# Patient Record
Sex: FEMALE | Race: BLACK OR AFRICAN AMERICAN | Employment: UNEMPLOYED | URBAN - METROPOLITAN AREA
[De-identification: names, ages, dates, MRNs, and addresses within clinical notes are randomized per-mention and may not be internally consistent; named-entity substitution may affect disease eponyms.]

---

## 2019-09-06 ENCOUNTER — HOSPITAL ENCOUNTER (EMERGENCY)
Age: 64
Discharge: HOME | End: 2019-09-06
Attending: EMERGENCY MEDICINE
Payer: COMMERCIAL

## 2019-09-06 VITALS
HEIGHT: 63 IN | TEMPERATURE: 98.3 F | WEIGHT: 159.61 LBS | OXYGEN SATURATION: 100 % | SYSTOLIC BLOOD PRESSURE: 128 MMHG | RESPIRATION RATE: 16 BRPM | DIASTOLIC BLOOD PRESSURE: 73 MMHG | HEART RATE: 81 BPM | BODY MASS INDEX: 28.28 KG/M2

## 2019-09-06 DIAGNOSIS — R42 LIGHTHEADEDNESS: Primary | ICD-10-CM

## 2019-09-06 DIAGNOSIS — R74.8 ELEVATED CK: ICD-10-CM

## 2019-09-06 LAB
ALBUMIN SERPL-MCNC: 4.1 G/DL (ref 3.5–5)
ALBUMIN/GLOB SERPL: 0.9 {RATIO} (ref 1.1–2.2)
ALP SERPL-CCNC: 99 U/L (ref 45–117)
ALT SERPL-CCNC: 25 U/L (ref 12–78)
ANION GAP SERPL CALC-SCNC: 7 MMOL/L (ref 5–15)
APPEARANCE UR: CLEAR
AST SERPL-CCNC: 23 U/L (ref 15–37)
BACTERIA URNS QL MICRO: NEGATIVE /HPF
BASOPHILS # BLD: 0 K/UL (ref 0–0.1)
BASOPHILS NFR BLD: 1 % (ref 0–1)
BILIRUB SERPL-MCNC: 0.4 MG/DL (ref 0.2–1)
BILIRUB UR QL: NEGATIVE
BUN SERPL-MCNC: 13 MG/DL (ref 6–20)
BUN/CREAT SERPL: 14 (ref 12–20)
CALCIUM SERPL-MCNC: 9.1 MG/DL (ref 8.5–10.1)
CHLORIDE SERPL-SCNC: 103 MMOL/L (ref 97–108)
CK MB CFR SERPL CALC: 1.9 % (ref 0–2.5)
CK MB SERPL-MCNC: 5.1 NG/ML (ref 5–25)
CK SERPL-CCNC: 264 U/L (ref 26–192)
CO2 SERPL-SCNC: 27 MMOL/L (ref 21–32)
COLOR UR: ABNORMAL
CREAT SERPL-MCNC: 0.94 MG/DL (ref 0.55–1.02)
DIFFERENTIAL METHOD BLD: NORMAL
EOSINOPHIL # BLD: 0.1 K/UL (ref 0–0.4)
EOSINOPHIL NFR BLD: 1 % (ref 0–7)
EPITH CASTS URNS QL MICRO: ABNORMAL /LPF
ERYTHROCYTE [DISTWIDTH] IN BLOOD BY AUTOMATED COUNT: 12.4 % (ref 11.5–14.5)
GLOBULIN SER CALC-MCNC: 4.6 G/DL (ref 2–4)
GLUCOSE SERPL-MCNC: 100 MG/DL (ref 65–100)
GLUCOSE UR STRIP.AUTO-MCNC: NEGATIVE MG/DL
HCT VFR BLD AUTO: 40 % (ref 35–47)
HGB BLD-MCNC: 12.6 G/DL (ref 11.5–16)
HGB UR QL STRIP: NEGATIVE
HYALINE CASTS URNS QL MICRO: ABNORMAL /LPF (ref 0–5)
IMM GRANULOCYTES # BLD AUTO: 0 K/UL (ref 0–0.04)
IMM GRANULOCYTES NFR BLD AUTO: 0 % (ref 0–0.5)
KETONES UR QL STRIP.AUTO: NEGATIVE MG/DL
LEUKOCYTE ESTERASE UR QL STRIP.AUTO: ABNORMAL
LYMPHOCYTES # BLD: 1.9 K/UL (ref 0.8–3.5)
LYMPHOCYTES NFR BLD: 35 % (ref 12–49)
MCH RBC QN AUTO: 27.3 PG (ref 26–34)
MCHC RBC AUTO-ENTMCNC: 31.5 G/DL (ref 30–36.5)
MCV RBC AUTO: 86.6 FL (ref 80–99)
MONOCYTES # BLD: 0.5 K/UL (ref 0–1)
MONOCYTES NFR BLD: 8 % (ref 5–13)
NEUTS SEG # BLD: 2.9 K/UL (ref 1.8–8)
NEUTS SEG NFR BLD: 55 % (ref 32–75)
NITRITE UR QL STRIP.AUTO: NEGATIVE
NRBC # BLD: 0 K/UL (ref 0–0.01)
NRBC BLD-RTO: 0 PER 100 WBC
PH UR STRIP: 7 [PH] (ref 5–8)
PLATELET # BLD AUTO: 255 K/UL (ref 150–400)
PMV BLD AUTO: 11.2 FL (ref 8.9–12.9)
POTASSIUM SERPL-SCNC: 3.5 MMOL/L (ref 3.5–5.1)
PROT SERPL-MCNC: 8.7 G/DL (ref 6.4–8.2)
PROT UR STRIP-MCNC: NEGATIVE MG/DL
RBC # BLD AUTO: 4.62 M/UL (ref 3.8–5.2)
RBC #/AREA URNS HPF: ABNORMAL /HPF (ref 0–5)
SODIUM SERPL-SCNC: 137 MMOL/L (ref 136–145)
SP GR UR REFRACTOMETRY: 1.01 (ref 1–1.03)
TROPONIN I SERPL-MCNC: <0.05 NG/ML
UA: UC IF INDICATED,UAUC: ABNORMAL
UROBILINOGEN UR QL STRIP.AUTO: 1 EU/DL (ref 0.2–1)
WBC # BLD AUTO: 5.3 K/UL (ref 3.6–11)
WBC URNS QL MICRO: ABNORMAL /HPF (ref 0–4)

## 2019-09-06 PROCEDURE — 84484 ASSAY OF TROPONIN QUANT: CPT

## 2019-09-06 PROCEDURE — 81001 URINALYSIS AUTO W/SCOPE: CPT

## 2019-09-06 PROCEDURE — 82550 ASSAY OF CK (CPK): CPT

## 2019-09-06 PROCEDURE — 36415 COLL VENOUS BLD VENIPUNCTURE: CPT

## 2019-09-06 PROCEDURE — 93005 ELECTROCARDIOGRAM TRACING: CPT

## 2019-09-06 PROCEDURE — 80053 COMPREHEN METABOLIC PANEL: CPT

## 2019-09-06 PROCEDURE — 82553 CREATINE MB FRACTION: CPT

## 2019-09-06 PROCEDURE — 85025 COMPLETE CBC W/AUTO DIFF WBC: CPT

## 2019-09-06 PROCEDURE — 99284 EMERGENCY DEPT VISIT MOD MDM: CPT

## 2019-09-06 RX ORDER — LOVASTATIN 40 MG/1
40 TABLET ORAL
COMMUNITY
End: 2022-09-09

## 2019-09-07 LAB
ATRIAL RATE: 81 BPM
CALCULATED P AXIS, ECG09: 78 DEGREES
CALCULATED R AXIS, ECG10: 52 DEGREES
CALCULATED T AXIS, ECG11: 70 DEGREES
DIAGNOSIS, 93000: NORMAL
P-R INTERVAL, ECG05: 136 MS
Q-T INTERVAL, ECG07: 348 MS
QRS DURATION, ECG06: 68 MS
QTC CALCULATION (BEZET), ECG08: 404 MS
VENTRICULAR RATE, ECG03: 81 BPM

## 2019-09-07 NOTE — ED PROVIDER NOTES
EMERGENCY DEPARTMENT HISTORY AND PHYSICAL EXAM      Date: 9/6/2019  Patient Name: Tung Mckeon  Patient Age and Sex: 59 y.o. female    History of Presenting Illness     Chief Complaint   Patient presents with    Dizziness     pt reports generalized weakness; reports 1 episode yesterday which subsided, with another episode today lasting about 6 minutes       History Provided By: Patient    Ability to gather history was limited by: none    HPI: Tung Mckeon, 59 y.o. female complains of vague symptoms of lightheadedness episodically since yesterday. She also has a sense of generalized total body weakness. She denies any pain or fevers, no muscle aches. She has no focal area of particular weakness, other than chronic weakness of the left leg secondary to myelopathy. This weakness is unchanged today. She has no headache, no chest pain, no vision or speech changes. No neck pain. She has not tried any new medications for her symptoms. At the time of my H&P she is asymptomatic and is requesting to be discharged home. Location: Lightheadedness  Quality:    Lightheadedness  Severity: Mild 2 days  Duration: 2 days to the  Timing:    Waxing and waning  Context:  None  Modifying factors: None  Associated symptoms: None    Pt denies any other alleviating or exacerbating factors. There are no other complaints, changes or physical findings at this time. Past Medical History:   Diagnosis Date    Hypertension     Spine disorder      No past surgical history on file. PCP: Jose Elias Lobo MD    Past History   Past Medical History:  Past Medical History:   Diagnosis Date    Hypertension     Spine disorder        Past Surgical History:  No past surgical history on file.     Family History:  Family History   Problem Relation Age of Onset    No Known Problems Mother     No Known Problems Father        Social History:  Social History     Tobacco Use    Smoking status: Never Smoker    Smokeless tobacco: Never Used Substance Use Topics    Alcohol use: Yes     Comment: rare    Drug use: No       Allergies: Allergies   Allergen Reactions    Lexapro [Escitalopram] Other (comments)     Elevated BP       Current Medications:  No current facility-administered medications on file prior to encounter. Current Outpatient Medications on File Prior to Encounter   Medication Sig Dispense Refill    lovastatin (MEVACOR) 40 mg tablet Take 40 mg by mouth nightly.  amLODIPine (NORVASC) 10 mg tablet Take  by mouth daily.  pravastatin (PRAVACHOL) 40 mg tablet Take 40 mg by mouth nightly.  methocarbamol (ROBAXIN) 500 mg tablet Take  by mouth three (3) times daily. Review of Systems   Review of Systems   Neurological: Positive for light-headedness. Negative for headaches. All other systems reviewed and are negative. Physical Exam   Vital Signs  Patient Vitals for the past 24 hrs:   Temp Pulse Resp BP SpO2   09/06/19 2124 -- 81 -- 128/73 --   09/06/19 2122 -- 79 -- 135/67 --   09/06/19 2120 -- 68 -- 144/69 --   09/06/19 1706 98.3 °F (36.8 °C) 80 16 (!) 129/108 100 %       Physical Exam   Constitutional: She is oriented to person, place, and time. She appears well-developed and well-nourished. No distress. HENT:   Head: Normocephalic and atraumatic. Eyes: Conjunctivae are normal. Right eye exhibits no discharge. Left eye exhibits no discharge. Neck: Normal range of motion. Neck supple. Cardiovascular: Normal rate, regular rhythm and normal heart sounds. No murmur heard. Pulmonary/Chest: Effort normal and breath sounds normal. No respiratory distress. She has no wheezes. Abdominal: Soft. She exhibits no distension. There is no tenderness. Musculoskeletal: Normal range of motion. She exhibits no deformity. Neurological: She is alert and oriented to person, place, and time. No cranial nerve deficit or sensory deficit. She exhibits normal muscle tone. GCS eye subscore is 4.  GCS verbal subscore is 5. GCS motor subscore is 6. Reflex Scores:       Patellar reflexes are 2+ on the right side and 3+ on the left side. LLE 4/5 strength flexors and extensors, slightly hyperreflexic. She reports both these findings are chronic and unchanged from baseline. Normal sensory exam.  Cranial nerves intact. Skin: Skin is warm and dry. No rash noted. Psychiatric: She has a normal mood and affect. Her behavior is normal. Thought content normal.   Nursing note and vitals reviewed. Diagnostic Study Results   Labs  Recent Results (from the past 24 hour(s))   EKG, 12 LEAD, INITIAL    Collection Time: 09/06/19  5:10 PM   Result Value Ref Range    Ventricular Rate 81 BPM    Atrial Rate 81 BPM    P-R Interval 136 ms    QRS Duration 68 ms    Q-T Interval 348 ms    QTC Calculation (Bezet) 404 ms    Calculated P Axis 78 degrees    Calculated R Axis 52 degrees    Calculated T Axis 70 degrees    Diagnosis       Normal sinus rhythm  Normal ECG  No previous ECGs available     CBC WITH AUTOMATED DIFF    Collection Time: 09/06/19  5:38 PM   Result Value Ref Range    WBC 5.3 3.6 - 11.0 K/uL    RBC 4.62 3.80 - 5.20 M/uL    HGB 12.6 11.5 - 16.0 g/dL    HCT 40.0 35.0 - 47.0 %    MCV 86.6 80.0 - 99.0 FL    MCH 27.3 26.0 - 34.0 PG    MCHC 31.5 30.0 - 36.5 g/dL    RDW 12.4 11.5 - 14.5 %    PLATELET 019 317 - 058 K/uL    MPV 11.2 8.9 - 12.9 FL    NRBC 0.0 0  WBC    ABSOLUTE NRBC 0.00 0.00 - 0.01 K/uL    NEUTROPHILS 55 32 - 75 %    LYMPHOCYTES 35 12 - 49 %    MONOCYTES 8 5 - 13 %    EOSINOPHILS 1 0 - 7 %    BASOPHILS 1 0 - 1 %    IMMATURE GRANULOCYTES 0 0.0 - 0.5 %    ABS. NEUTROPHILS 2.9 1.8 - 8.0 K/UL    ABS. LYMPHOCYTES 1.9 0.8 - 3.5 K/UL    ABS. MONOCYTES 0.5 0.0 - 1.0 K/UL    ABS. EOSINOPHILS 0.1 0.0 - 0.4 K/UL    ABS. BASOPHILS 0.0 0.0 - 0.1 K/UL    ABS. IMM.  GRANS. 0.0 0.00 - 0.04 K/UL    DF AUTOMATED     METABOLIC PANEL, COMPREHENSIVE    Collection Time: 09/06/19  5:38 PM   Result Value Ref Range    Sodium 137 136 - 145 mmol/L    Potassium 3.5 3.5 - 5.1 mmol/L    Chloride 103 97 - 108 mmol/L    CO2 27 21 - 32 mmol/L    Anion gap 7 5 - 15 mmol/L    Glucose 100 65 - 100 mg/dL    BUN 13 6 - 20 MG/DL    Creatinine 0.94 0.55 - 1.02 MG/DL    BUN/Creatinine ratio 14 12 - 20      GFR est AA >60 >60 ml/min/1.73m2    GFR est non-AA 60 (L) >60 ml/min/1.73m2    Calcium 9.1 8.5 - 10.1 MG/DL    Bilirubin, total 0.4 0.2 - 1.0 MG/DL    ALT (SGPT) 25 12 - 78 U/L    AST (SGOT) 23 15 - 37 U/L    Alk. phosphatase 99 45 - 117 U/L    Protein, total 8.7 (H) 6.4 - 8.2 g/dL    Albumin 4.1 3.5 - 5.0 g/dL    Globulin 4.6 (H) 2.0 - 4.0 g/dL    A-G Ratio 0.9 (L) 1.1 - 2.2     CK W/ REFLX CKMB    Collection Time: 09/06/19  5:38 PM   Result Value Ref Range     (H) 26 - 192 U/L   TROPONIN I    Collection Time: 09/06/19  5:38 PM   Result Value Ref Range    Troponin-I, Qt. <0.05 <0.05 ng/mL   CK-MB,QUANT.     Collection Time: 09/06/19  5:38 PM   Result Value Ref Range    CK - MB 5.1 (H) <3.6 NG/ML    CK-MB Index 1.9 0.0 - 2.5     URINALYSIS W/ REFLEX CULTURE    Collection Time: 09/06/19  9:01 PM   Result Value Ref Range    Color YELLOW/STRAW      Appearance CLEAR CLEAR      Specific gravity 1.012 1.003 - 1.030      pH (UA) 7.0 5.0 - 8.0      Protein NEGATIVE  NEG mg/dL    Glucose NEGATIVE  NEG mg/dL    Ketone NEGATIVE  NEG mg/dL    Bilirubin NEGATIVE  NEG      Blood NEGATIVE  NEG      Urobilinogen 1.0 0.2 - 1.0 EU/dL    Nitrites NEGATIVE  NEG      Leukocyte Esterase TRACE (A) NEG      WBC 0-4 0 - 4 /hpf    RBC 0-5 0 - 5 /hpf    Epithelial cells FEW FEW /lpf    Bacteria NEGATIVE  NEG /hpf    UA:UC IF INDICATED CULTURE NOT INDICATED BY UA RESULT CNI      Hyaline cast 0-2 0 - 5 /lpf       Radiologic Studies  No orders to display     CT Results  (Last 48 hours)    None        CXR Results  (Last 48 hours)    None          Procedures     EKG  Date/Time: 9/6/2019 9:37 PM  Performed by: Ricardo Enciso MD  Authorized by: Ricardo Enciso MD     ECG reviewed by ED Physician in the absence of a cardiologist: yes    Interpretation:     Interpretation: non-specific    Rate:     ECG rate assessment: normal    Rhythm:     Rhythm: sinus rhythm    Ectopy:     Ectopy: none    QRS:     QRS axis:  Normal  ST segments:     ST segments:  Normal  T waves:     T waves: normal          Medical Decision Making     Provider Notes (Medical Decision Making):   59-year-old female with vague lightheadedness and total body weakness symptoms, waxing and waning since yesterday, no syncope or chest pain or shortness of breath or fever. She is a symptomatic in the emergency department. She is well-appearing, no distress. Normal vital signs, normal orthostatic vital signs. Laboratories and EKG all reassuring. Physical exam reassuring. She is stable for discharge home, no concern for ACS, CVA, or acute spinal cord pathology. Delaney Lester MD  9:34 PM        Consult required? No      Medications Administered During ED Course:  Medications - No data to display       Diagnosis     Disposition:  Discharged    Clinical Impression:   1. Lightheadedness    2. Elevated CK        Attestation:  I personally performed the services described in this documentation on this date 9/6/2019 for patient Diane Colmenares. Delaney Lester MD        I was the first provider for this patient on this visit. To the best of my ability I reviewed relevant prior medical records, electrocardiograms, laboratories, and radiologic studies. The patient's presenting problems were discussed, and the patient was in agreement with the care plan formulated and outlined with them. Delaney Lester MD    Please note that this dictation was completed with Dragon voice recognition software. Quite often unanticipated grammatical, syntax, homophones, and other interpretive errors are inadvertently transcribed by the computer software.  Please disregard these errors and excuse any errors that have escaped final proofreading.

## 2019-09-07 NOTE — ED NOTES
Assumed care of patient from triage. Patient c/o lightheadedness and near syncope. Patient states she noticed and episode yesterday and sensation began again this evening. Patient denies syncope. Patient denies n/v/CP/SOB. Patient denies numbness/tingling. Patient a/o x 4.

## 2019-09-07 NOTE — ED NOTES
Orthostatic vitals completed per MD request. Patient tolerated well and denied dizziness with position change. Dr. Elizabeth Sultana notified.

## 2019-09-07 NOTE — ED NOTES
Patient discharged by Dr. Jocelyne Sifuentes. Patient ambulated with steady gait in NAD on departure.

## 2019-09-07 NOTE — DISCHARGE INSTRUCTIONS
Your CK (muscle enzyme) level is very mildly elevated at 264. This may be due to taking a statin. At this time you should continue to take your statin as prescribed, but you can discuss this elevated CK level with your doctor.

## 2022-06-09 ENCOUNTER — OFFICE VISIT (OUTPATIENT)
Dept: ORTHOPEDIC SURGERY | Age: 67
End: 2022-06-09
Payer: COMMERCIAL

## 2022-06-09 VITALS — BODY MASS INDEX: 28.89 KG/M2 | WEIGHT: 157 LBS | HEIGHT: 62 IN

## 2022-06-09 DIAGNOSIS — M54.50 CHRONIC BILATERAL LOW BACK PAIN WITHOUT SCIATICA: Primary | ICD-10-CM

## 2022-06-09 DIAGNOSIS — M54.2 NECK PAIN: ICD-10-CM

## 2022-06-09 DIAGNOSIS — M48.062 SPINAL STENOSIS OF LUMBAR REGION WITH NEUROGENIC CLAUDICATION: ICD-10-CM

## 2022-06-09 DIAGNOSIS — M54.2 PAIN IN NECK: ICD-10-CM

## 2022-06-09 DIAGNOSIS — M48.02 CERVICAL STENOSIS OF SPINAL CANAL: ICD-10-CM

## 2022-06-09 DIAGNOSIS — M43.16 SPONDYLOLISTHESIS OF LUMBAR REGION: ICD-10-CM

## 2022-06-09 DIAGNOSIS — G89.29 CHRONIC BILATERAL LOW BACK PAIN WITHOUT SCIATICA: Primary | ICD-10-CM

## 2022-06-09 DIAGNOSIS — M50.90 CERVICAL DISC DISEASE: ICD-10-CM

## 2022-06-09 PROCEDURE — 1123F ACP DISCUSS/DSCN MKR DOCD: CPT | Performed by: ORTHOPAEDIC SURGERY

## 2022-06-09 PROCEDURE — 99204 OFFICE O/P NEW MOD 45 MIN: CPT | Performed by: ORTHOPAEDIC SURGERY

## 2022-06-09 RX ORDER — ATORVASTATIN CALCIUM 20 MG/1
20 TABLET, FILM COATED ORAL DAILY
COMMUNITY
Start: 2022-05-02

## 2022-06-09 RX ORDER — BACLOFEN 20 MG/1
TABLET ORAL
COMMUNITY
Start: 2022-05-02

## 2022-06-09 RX ORDER — AMLODIPINE BESYLATE 10 MG/1
10 TABLET ORAL DAILY
COMMUNITY

## 2022-06-09 RX ORDER — MELOXICAM 15 MG/1
15 TABLET ORAL DAILY PRN
COMMUNITY
Start: 2022-02-14 | End: 2022-09-09

## 2022-06-09 NOTE — PROGRESS NOTES
Alessandra Morrissey (: 1955) is a 79 y.o. female, patient, here for evaluation of the following chief complaint(s):  LOW BACK PAIN       ASSESSMENT/PLAN:    Below is the assessment and plan developed based on review of pertinent history, physical exam, labs, studies, and medications. She states that she had a bad neck injury several years ago and was told not to have surgery. She is having increasing signs of ataxia and balance issues. She has hyperreflexia in the upper and lower extremities. While she does have a mild spondylolisthesis at L4-5 I think I need to rule out any cord compression in the neck. I am going to get an MRI of both the neck and the lumbar spine and see her back. 1. Chronic bilateral low back pain without sciatica  -     XR SPINE LUMB MIN 4 V; Future  -     MRI CERV SPINE WO CONT; Future  -     MRI LUMB SPINE WO CONT; Future  2. Pain in neck  -     MRI CERV SPINE WO CONT; Future      No follow-ups on file. SUBJECTIVE/OBJECTIVE:  Alessandra Morrissey (: 1955) is a 79 y.o. female. No flowsheet data found. The patient presents today with chronic neck pain and lower back pain. She has a remote history of some sort of cervical injury in the remote past but is unclear about this on history. Recently has been reporting increasing stiffness in the lower back as well as a lot of difficulty with balance issues and ataxia. She is now having to walk with a cane. She denies any bowel bladder difficulties    Imaging:    XR Results (most recent):  Results from Appointment encounter on 22    XR SPINE LUMB MIN 4 V    Narrative  AP and lateral flexion-extension of the lumbar spine reviewed today. She has mild grade 1 spondylosis at L4-5. She has spondylosis in the lumbar spine. She has no fracture lytic lesions                 MRI Results (most recent):           Allergies   Allergen Reactions    Lexapro [Escitalopram] Other (comments)     Elevated BP       Current Outpatient Medications   Medication Sig    baclofen (LIORESAL) 20 mg tablet TAKE 1 TABLET BY MOUTH THREE TIMES DAILY AS NEEDED FOR PAIN    atorvastatin (LIPITOR) 20 mg tablet Take 20 mg by mouth daily.  amLODIPine (NORVASC) 10 mg tablet Take  by mouth.  lovastatin (MEVACOR) 40 mg tablet Take 40 mg by mouth nightly.  meloxicam (MOBIC) 15 mg tablet Take 15 mg by mouth daily as needed. (Patient not taking: Reported on 6/9/2022)    pravastatin (PRAVACHOL) 40 mg tablet Take 40 mg by mouth nightly. (Patient not taking: Reported on 6/9/2022)    amLODIPine (NORVASC) 10 mg tablet Take  by mouth daily. (Patient not taking: Reported on 6/9/2022)    methocarbamol (ROBAXIN) 500 mg tablet Take  by mouth three (3) times daily. (Patient not taking: Reported on 6/9/2022)     No current facility-administered medications for this visit. Past Medical History:   Diagnosis Date    Hypertension     Spine disorder         No past surgical history on file. Family History   Problem Relation Age of Onset    No Known Problems Mother     No Known Problems Father         Social History     Tobacco Use    Smoking status: Never Smoker    Smokeless tobacco: Never Used   Vaping Use    Vaping Use: Not on file   Substance Use Topics    Alcohol use: Yes     Comment: rare    Drug use: No        Review of Systems       Vitals:  Ht 5' 2\" (1.575 m)   Wt 157 lb (71.2 kg)   BMI 28.72 kg/m²    Body mass index is 28.72 kg/m². Ortho Exam       Alert and [place]  x 3    Normal gait and station; normal posture    No assistive devices today. Lumbar spine:  Examination of the lumbar spine demonstrates no tenderness on palpation, no pain, no swelling or edema, with normal lumbar range of motion. Thoracic spine: Examination of the thoracic spine demonstrates no tenderness on palpation, no pain, no swelling or edema. Normal sensation and range of motion.      Cervical spine:     Examination of the cervical spine demonstrates on inspection: No acute changes. NO shoulder assymetry. No incisions . On palpation: Mild tenderness on palpation of the lower CT junctions    Range of motion: Normal    Motor examination:  Right deltoid 5/5,  left deltoid 5/5, right bicep 5/5, left bicep 5/5, right wrist extensor 5/5, left wrist extensor 5/5, right biceps 5/5, left triceps 5/5, right intrinsics 5/5, left intrinsics    Sensory examination: Reveals No gross deficits    Reflexes: Left bicep 2/2, left triceps 2/2, right biceps 2/2, left triceps 2/2    Functional testing: Spurling's exam is mild positive bilaterally; upper limb tension test is mildly positive    Katz's signs mildly positive bilaterally. An electronic signature was used to authenticate this note.   -- Mireya Reaves MD

## 2022-06-09 NOTE — LETTER
7/5/2022    Patient: Jessica Lou   YOB: 1955   Date of Visit: 6/9/2022     Jorge Ventura, [address] [facility] [address]  [address] [address]  Via Fax: [phone]    Dear Jorge Ventura MD,      Thank you for referring Ms. Francisco Saldivar to [facility] for evaluation. My notes for this consultation are attached. If you have questions, please do not hesitate to call me. I look forward to following your patient along with you.       Sincerely,    Edin Mccollum MD

## 2022-07-05 NOTE — PATIENT INSTRUCTIONS
Neck: Exercises  Introduction  Here are some examples of exercises for you to try. The exercises may be suggested for a condition or for rehabilitation. Start each exercise slowly. Ease off the exercises if you start to have pain. You will be told when to start these exercises and which ones will work best for you. How to do the exercises  Neck stretch    1. This stretch works best if you keep your shoulder down as you lean away from it. To help you remember to do this, start by relaxing your shoulders and lightly holding on to your thighs or your chair. 2. Tilt your head toward your shoulder and hold for 15 to 30 seconds. Let the weight of your head stretch your muscles. 3. If you would like a little added stretch, use your hand to gently and steadily pull your head toward your shoulder. For example, keeping your right shoulder down, lean your head to the left. 4. Repeat 2 to 4 times toward each shoulder. Diagonal neck stretch    1. Turn your head slightly toward the direction you will be stretching, and tilt your head diagonally toward your chest and hold for 15 to 30 seconds. 2. If you would like a little added stretch, use your hand to gently and steadily pull your head forward on the diagonal.  3. Repeat 2 to 4 times toward each side. Dorsal glide stretch    The dorsal glide stretches the back of the neck. If you feel pain, do not glide so far back. Some people find this exercise easier to do while lying on their backs with an ice pack on the neck. 1. Sit or stand tall and look straight ahead. 2. Slowly tuck your chin as you glide your head backward over your body  3. Hold for a count of 6, and then relax for up to 10 seconds. 4. Repeat 8 to 12 times. Chest and shoulder stretch    1. Sit or stand tall and glide your head backward as in the dorsal glide stretch. 2. Raise both arms so that your hands are next to your ears.   3. Take a deep breath, and as you breathe out, lower your elbows down and behind your back. You will feel your shoulder blades slide down and together, and at the same time you will feel a stretch across your chest and the front of your shoulders. 4. Hold for about 6 seconds, and then relax for up to 10 seconds. 5. Repeat 8 to 12 times. Strengthening: Hands on head    1. Move your head backward, forward, and side to side against gentle pressure from your hands, holding each position for about 6 seconds. 2. Repeat 8 to 12 times. Follow-up care is a key part of your treatment and safety. Be sure to make and go to all appointments, and call your doctor if you are having problems. It's also a good idea to know your test results and keep a list of the medicines you take. Where can you learn more? Go to http://www.ga.com/  Enter P975 in the search box to learn more about \"Neck: Exercises. \"  Current as of: July 1, 2021               Content Version: 13.2  © 2006-2022 [employer]. Care instructions adapted under license by [employer] (which disclaims liability or warranty for this information). If you have questions about a medical condition or this instruction, always ask your healthcare professional. [address] any warranty or liability for your use of this information.

## 2022-08-04 NOTE — PROGRESS NOTES
Marito Abraham called the case was denied for the Cervical  spine. No reason for denial listed. The require a P2P. They are aware this will likely not be completed prior to the apt tomorrow as the providers are in the OR.     Case:  [id number]  Phone: [phone]

## 2022-08-05 ENCOUNTER — HOSPITAL ENCOUNTER (OUTPATIENT)
Dept: MRI IMAGING | Age: 67
Discharge: HOME | End: 2022-08-05
Attending: ORTHOPAEDIC SURGERY
Payer: COMMERCIAL

## 2022-08-05 ENCOUNTER — APPOINTMENT (OUTPATIENT)
Dept: MRI IMAGING | Age: 67
End: 2022-08-05
Attending: ORTHOPAEDIC SURGERY
Payer: COMMERCIAL

## 2022-08-05 DIAGNOSIS — M48.02 CERVICAL STENOSIS OF SPINAL CANAL: ICD-10-CM

## 2022-08-05 DIAGNOSIS — M54.2 NECK PAIN: ICD-10-CM

## 2022-08-05 DIAGNOSIS — M48.062 SPINAL STENOSIS OF LUMBAR REGION WITH NEUROGENIC CLAUDICATION: ICD-10-CM

## 2022-08-05 DIAGNOSIS — G89.29 CHRONIC BILATERAL LOW BACK PAIN WITHOUT SCIATICA: ICD-10-CM

## 2022-08-05 DIAGNOSIS — M54.50 CHRONIC BILATERAL LOW BACK PAIN WITHOUT SCIATICA: ICD-10-CM

## 2022-08-05 DIAGNOSIS — M54.2 PAIN IN NECK: ICD-10-CM

## 2022-08-05 DIAGNOSIS — M50.90 CERVICAL DISC DISEASE: ICD-10-CM

## 2022-08-05 PROCEDURE — 72148 MRI LUMBAR SPINE W/O DYE: CPT

## 2022-08-15 ENCOUNTER — TELEPHONE (OUTPATIENT)
Dept: ORTHOPEDIC SURGERY | Age: 67
End: 2022-08-15

## 2022-08-15 NOTE — TELEPHONE ENCOUNTER
Alice Mark completed a peer to peer with Dr. Shaye Ordonez. MRI of the C spine approved [id number] Exp 09/29/2022. Patient notified this is approved and advised to call and reschedule the study. Once the study is completed she needs an office visit to review the results.

## 2022-08-24 ENCOUNTER — HOSPITAL ENCOUNTER (OUTPATIENT)
Dept: MRI IMAGING | Age: 67
Discharge: HOME | End: 2022-08-24
Attending: ORTHOPAEDIC SURGERY
Payer: COMMERCIAL

## 2022-08-24 DIAGNOSIS — G89.29 CHRONIC BILATERAL LOW BACK PAIN WITHOUT SCIATICA: ICD-10-CM

## 2022-08-24 DIAGNOSIS — M54.50 CHRONIC BILATERAL LOW BACK PAIN WITHOUT SCIATICA: ICD-10-CM

## 2022-08-24 DIAGNOSIS — M54.2 PAIN IN NECK: ICD-10-CM

## 2022-08-24 PROCEDURE — 72141 MRI NECK SPINE W/O DYE: CPT

## 2022-09-01 ENCOUNTER — OFFICE VISIT (OUTPATIENT)
Dept: ORTHOPEDIC SURGERY | Age: 67
End: 2022-09-01
Payer: COMMERCIAL

## 2022-09-01 VITALS — HEIGHT: 63 IN | BODY MASS INDEX: 27.64 KG/M2 | WEIGHT: 156 LBS

## 2022-09-01 DIAGNOSIS — M43.16 SPONDYLOLISTHESIS OF LUMBAR REGION: ICD-10-CM

## 2022-09-01 DIAGNOSIS — M48.062 SPINAL STENOSIS OF LUMBAR REGION WITH NEUROGENIC CLAUDICATION: ICD-10-CM

## 2022-09-01 DIAGNOSIS — M48.02 CERVICAL STENOSIS OF SPINAL CANAL: ICD-10-CM

## 2022-09-01 DIAGNOSIS — M54.2 NECK PAIN: Primary | ICD-10-CM

## 2022-09-01 PROCEDURE — 1123F ACP DISCUSS/DSCN MKR DOCD: CPT | Performed by: ORTHOPAEDIC SURGERY

## 2022-09-01 PROCEDURE — 99214 OFFICE O/P EST MOD 30 MIN: CPT | Performed by: ORTHOPAEDIC SURGERY

## 2022-09-01 NOTE — LETTER
9/1/2022    Patient: Soham Carter   YOB: 1955   Date of Visit: 9/1/2022     Anson Hughes, [address]  [address] [address]  Via Fax: [phone]    Dear Anson Hughes MD,      Thank you for referring Ms. Karen Hutton to [facility] for evaluation. My notes for this consultation are attached. If you have questions, please do not hesitate to call me. I look forward to following your patient along with you.       Sincerely,    Ashley Beal MD
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
## 2022-09-01 NOTE — PATIENT INSTRUCTIONS
Cervical Spinal Fusion: Before Your Surgery  What is cervical spinal fusion? Cervical spinal fusion is surgery that joins two or more of the vertebrae in your neck. When these bones are joined together, it's called fusion. After the joints are fused, they can no longer move. During the surgery, the doctor uses bone to make a \"bridge\" between your vertebrae. This bridge may be strengthened with metal plates and screws. In most cases, the doctor uses bone from another part of your body or bone that has been donated to a bone bank. But sometimes artificial bone is used. To do the surgery, the doctor makes a cut in either the front or the back of your neck. The cut is called an incision. It leaves a scar that fades with time. After surgery, you will stay in the hospital for a few days. Your neck will feel stiff or sore. You will get medicine to help with pain. Most people can go back to work after 4 to 6 weeks. But it may take a few months to get back to your usual activities. Follow-up care is a key part of your treatment and safety. Be sure to make and go to all appointments, and call your doctor if you are having problems. It's also a good idea to know your test results and keep a list of the medicines you take. How do you prepare for surgery? Surgery can be stressful. This information will help you understand what you can expect. And it will help you safely prepare for surgery. Preparing for surgery    Be sure you have someone to take you home. Anesthesia and pain medicine will make it unsafe for you to drive or get home on your own. Understand exactly what surgery is planned, along with the risks, benefits, and other options. If you take aspirin or some other blood thinner, ask your doctor if you should stop taking it before your surgery. Make sure that you understand exactly what your doctor wants you to do. These medicines increase the risk of bleeding.      Tell your doctor ALL the medicines, vitamins, supplements, and herbal remedies you take. Some may increase the risk of problems during your surgery. Your doctor will tell you if you should stop taking any of them before the surgery and how soon to do it. Make sure your doctor and the hospital have a copy of your advance directive. If you don't have one, you may want to prepare one. It lets others know your health care wishes. It's a good thing to have before any type of surgery or procedure. What happens on the day of surgery? Follow the instructions exactly about when to stop eating and drinking. If you don't, your surgery may be canceled. If your doctor told you to take your medicines on the day of surgery, take them with only a sip of water. Take a bath or shower before you come in for your surgery. Do not apply lotions, perfumes, deodorants, or nail polish. Do not shave the surgical site yourself. Take off all jewelry and piercings. And take out contact lenses, if you wear them. At the hospital or surgery center   Bring a picture ID. The area for surgery is often marked to make sure there are no errors. You will be kept comfortable and safe by your anesthesia provider. You will be asleep during the surgery. The surgery usually takes 2 to 4 hours. If more than two vertebrae are being fused together, it will take longer. When you wake up, you will be lying on your back. You will have a soft or hard collar around your neck. This will protect and support your neck. It will also keep you from turning your head. You may have a small plastic tube coming out of your incision. This is to drain fluids. It's usually taken out in 1 or 2 days. When should you call your doctor? You have questions or concerns. You do not understand how to prepare for your surgery. You become ill before surgery (such as fever, flu, or a cold).      You need to reschedule or have changed your mind about having the surgery. Where can you learn more? Go to http://www.gray.com/  Enter N543 in the search box to learn more about \"Cervical Spinal Fusion: Before Your Surgery. \"  Current as of: July 1, 2021               Content Version: 13.2  © 7294-7303 [employer]. Care instructions adapted under license by [employer] (which disclaims liability or warranty for this information). If you have questions about a medical condition or this instruction, always ask your healthcare professional. [address] any warranty or liability for your use of this information.

## 2022-09-03 DIAGNOSIS — M48.02 CERVICAL STENOSIS OF SPINAL CANAL: ICD-10-CM

## 2022-09-03 DIAGNOSIS — M48.062 SPINAL STENOSIS OF LUMBAR REGION WITH NEUROGENIC CLAUDICATION: Primary | ICD-10-CM

## 2022-09-03 DIAGNOSIS — M54.2 NECK PAIN: ICD-10-CM

## 2022-09-09 ENCOUNTER — HOSPITAL ENCOUNTER (OUTPATIENT)
Dept: PREADMISSION TESTING | Age: 67
Discharge: HOME | End: 2022-09-09
Payer: COMMERCIAL

## 2022-09-09 VITALS
HEART RATE: 67 BPM | DIASTOLIC BLOOD PRESSURE: 77 MMHG | RESPIRATION RATE: 18 BRPM | TEMPERATURE: 98.1 F | WEIGHT: 158.73 LBS | HEIGHT: 63 IN | BODY MASS INDEX: 28.12 KG/M2 | SYSTOLIC BLOOD PRESSURE: 162 MMHG

## 2022-09-09 LAB
ABO + RH BLD: NORMAL
APPEARANCE UR: CLEAR
BACTERIA URNS QL MICRO: NEGATIVE /HPF
BILIRUB UR QL: NEGATIVE
BLOOD GROUP ANTIBODIES SERPL: NORMAL
COLOR UR: NORMAL
EPITH CASTS URNS QL MICRO: NORMAL /LPF
GLUCOSE UR STRIP.AUTO-MCNC: NEGATIVE MG/DL
HGB UR QL STRIP: NEGATIVE
HYALINE CASTS URNS QL MICRO: NORMAL /LPF (ref 0–5)
KETONES UR QL STRIP.AUTO: NEGATIVE MG/DL
LEUKOCYTE ESTERASE UR QL STRIP.AUTO: NEGATIVE
NITRITE UR QL STRIP.AUTO: NEGATIVE
PH UR STRIP: 6 [PH] (ref 5–8)
PROT UR STRIP-MCNC: NEGATIVE MG/DL
RBC #/AREA URNS HPF: NORMAL /HPF (ref 0–5)
SP GR UR REFRACTOMETRY: 1.01 (ref 1–1.03)
SPECIMEN EXP DATE BLD: NORMAL
UA: UC IF INDICATED,UAUC: NORMAL
UROBILINOGEN UR QL STRIP.AUTO: 0.2 EU/DL (ref 0.2–1)
WBC URNS QL MICRO: NORMAL /HPF (ref 0–4)

## 2022-09-09 PROCEDURE — 36415 COLL VENOUS BLD VENIPUNCTURE: CPT

## 2022-09-09 PROCEDURE — 86900 BLOOD TYPING SEROLOGIC ABO: CPT

## 2022-09-09 PROCEDURE — 81001 URINALYSIS AUTO W/SCOPE: CPT

## 2022-09-09 RX ORDER — FISH OIL/DHA/EPA 1200-144MG
1 CAPSULE ORAL DAILY
COMMUNITY

## 2022-09-09 RX ORDER — ASPIRIN 81 MG/1
81 TABLET ORAL
COMMUNITY

## 2022-09-09 RX ORDER — MINERAL OIL
180 ENEMA (ML) RECTAL
COMMUNITY

## 2022-09-09 RX ORDER — AMOXICILLIN 250 MG
2 CAPSULE ORAL
COMMUNITY

## 2022-09-09 RX ORDER — BISMUTH SUBSALICYLATE 262 MG
1 TABLET,CHEWABLE ORAL DAILY
COMMUNITY

## 2022-09-09 NOTE — PERIOP NOTES
[address] [facility] INSTRUCTIONS  ORTHOPAEDIC    Surgery Date:   9/16/22    Your surgeon's office or [facility] staff will call you between 4 PM- 8 PM the day before surgery with your arrival time. If your surgery is on a Monday, you will receive a call the preceding Friday. Please report to [facility]Admitting on the 1st floor. Bring your insurance card, photo identification, and any copayment (if applicable). If you are going home the same day of your surgery, you must have a responsible adult to drive you home. You need to have a responsible adult to stay with you the first 24 hours after surgery and you should not drive a car for 24 hours following your surgery. Do NOT eat any solid foods after midnight the night before surgery including candy, mints or gum. You may drink clear liquids from midnight until 1 hour prior to arrival time. You may drink up to 12 ounces at one time every 4 hours. Do NOT drink alcohol or smoke 24 hours before surgery. STOP smoking for 14 days prior as it helps with breathing and healing after surgery. If your arrival time is 3pm or later, you may eat a light breakfast before 8am (toast, bagel-no butter, black coffee, plain tea, fruit juice-no pulp) Please note special instructions, if applicable, below for medications. If you are being admitted to the hospital,please leave personal belongings/luggage in your car until you have an assigned hospital room number. Please wear comfortable clothes. Wear your glasses instead of contacts. We ask that all money, jewelry and valuables be left at home. Wear no make up, particularly mascara, the day of surgery. All body piercings, rings, and jewelry need to be removed and left at home. Please remove any nail polish or artificial nails from your fingernails. Please wear your hair loose or down. Please no pony-tails, buns, or any metal hair accessories.  If you shower the morning of surgery, please do not apply any lotions or powders afterwards. You may wear deodorant. Do not shave any body area within 24 hours of your surgery. Please follow all instructions to avoid any potential surgical cancellation. Should your physical condition change, (i.e. fever, cold, flu, etc.) please notify your surgeon as soon as possible. It is important to be on time. If a situation occurs where you may be delayed, please call:  [phone] / [phone] on the day of surgery. The Preadmission Testing staff can be reached at [phone]. Special instructions: NONE    Current Outpatient Medications   Medication Sig    aspirin delayed-release 81 mg tablet Take  by mouth daily as needed for Pain. senna-docusate (Senokot-S) 8.6-50 mg per tablet Take 2 Tablets by mouth daily as needed for Constipation. fish oil-dha-epa 1,200-144-216 mg cap Take  by mouth daily. multivitamin (ONE A DAY) tablet Take 1 Tablet by mouth daily. fexofenadine (ALLEGRA) 180 mg tablet Take 180 mg by mouth daily as needed for Allergies. baclofen (LIORESAL) 20 mg tablet TAKE 1 TABLET BY MOUTH THREE TIMES DAILY AS NEEDED FOR PAIN    atorvastatin (LIPITOR) 20 mg tablet Take 20 mg by mouth daily. amLODIPine (NORVASC) 10 mg tablet Take  by mouth. No current facility-administered medications for this encounter. YOU MUST ONLY TAKE THESE MEDICATIONS THE MORNING OF SURGERY WITH A SIP OF WATER: AMLODIPINE,  ATORVASTATIN  MEDICATIONS TO TAKE THE MORNING OF SURGERY ONLY IF NEEDED: NONE  HOLD these prescription medications BEFORE Surgery: STOP ASPIRIN NOW FOR SURGERY  Ask your surgeon/prescribing physician about when/if to STOP taking these medications: NONE  Stop any non-steroidal anti-inflammatory drugs (i.e. Ibuprofen, Naproxen, Advil, Aleve) 3 days before surgery. You may take Tylenol. STOP all vitamins and herbal supplements 1 week prior to  surgery.   If you are currently taking Plavix, Coumadin, or any other blood-thinning/anticoagulant medication contact your prescribing physician for instructions. Preventing Infections Before and After - Your Surgery    IMPORTANT INSTRUCTIONS    You play an important role in your health and preparation for surgery. To reduce the germs on your skin you will need to shower with CHG soap (Chorhexidine gluconate 4%) two times before surgery. CHG soap (Hibiclens, Hex-A-Clens or store brand)  CHG soap will be provided at your Preadmission Testing (PAT) appointment. If you do not have a PAT appointment before surgery, you may arrange to  CHG soap from our office or purchase CHG soap at a pharmacy, grocery or department store. You need to purchase TWO 4 ounce bottles to use for your 2 showers. Steps to follow:  Veneg[place]long your hair with your normal shampoo and your body with regular soap and rinse well to remove shampoo and soap from your skin. Wet a clean washcloth and turn off the shower. Put CHG soap on washcloth and apply to your entire body from the neck down. Do not use on your head, face or private parts(genitals). Do not use CHG soap on open sores, wounds or areas of skin irritation. Wash you body gently for 5 minutes. Do not wash your skin too hard. This soap does not create lather. Pay special attention to your underarms and from your belly button to your feet. Turn the shower back on and rinse well to get CHG soap off your body. Pat your skin dry with a clean, dry towel. Do not apply lotions or moisturizer. Put on clean clothes and sleep on fresh bed sheets and do not allow pets to sleep with you. Shower with CHG soap 2 times before your surgery  The evening before your surgery  The morning of your surgery      Tips to help prevent infections after your surgery:  Protect your surgical wound from germs:  Hand washing is the most important thing you and your caregivers can do to prevent infections. Keep your bandage clean and dry!   Do not touch your surgical wound.  Use clean, freshly washed towels and washcloths every time you shower; do not share bath linens with others. Until your surgical wound is healed, wear clothing and sleep on bed linens each day that are clean and freshly washed. Do not allow pets to sleep in your bed with you or touch your surgical wound. Do not smoke - smoking delays wound healing. This may be a good time to stop smoking. If you have diabetes, it is important for you to manage your blood sugar levels properly before your surgery as well as after your surgery. Poorly managed blood sugar levels slow down wound healing and prevent you from healing completely. Prevention of Infection  Testing for Staphylococcus aureus on your skin before surgery    Staphylococcus aureus (staph) is a common bacteria that is found on the body. It normally does not cause infection on healthy skin. Before surgery, you will be tested to see if you have staph by swabbing the inside of your nose. When you have an incision with surgery, the goal is to protect that incision from infection. Removal of the staph bacteria before surgery can decrease the risk of a surgical site infection. If your nose swab is positive for staph you will be called. Your treatment will include 2 steps:  Prescription for Mupirocin ointment to be used in each nostril twice a day for 5 days. Showering with Chlorhexidine (CHG) liquid soap for 5 days prior to surgery. How to use Mupirocin ointment in your nose   the prescription from your pharmacy. You will receive a large tube of ointment which will be big enough for all of your treatments. You will apply this ointment to each nostril 2 times a day for 5 days. Wash your hands with  gel or soap and water for 20 seconds before using ointment. Place a pea-sized amount of ointment on a cotton Q-tip. Apply ointment just inside of each nostril with the Q-tip. Do not push Q-tip or ointment deep inside you nose.   Press your nostrils together and massage for a few seconds. Wash your hands with  gel or soap and water after you are finished. Do not get ointment near your eyes. If it gets into your eyes, rinse them with cool water. If you need to use nasal spray, clean the tip of the bottle with alcohol before use and do not use both at the same time. If you are scheduled for COVID testing during the 5 days, do NOT apply morning dose until after the COVID test has been performed. How to use Chlorhexidine (CHG) 4% liquid soap  Purchase an 8 ounce bottle of CHG liquid soap (Chlorhexidine 4%, Hibiclens, Hex-A-Clens or store brand) at a pharmacy or grocery store. Wash your hair with your normal shampoo and your body with regular soap and rinse well to remove shampoo and soap from your skin. Wet a clean washcloth and turn off the shower. Put CHG soap on washcloth and apply to your entire body from the neck down. Do not use on your head, face or private parts(genitals). Do not use CHG soap on open sores, wounds or areas of skin irritation. Wash your body gently for 5 minutes. Do not wash your skin too hard. This soap does not create lather. Pay special attention to your underarms and from your belly button to your feet. Turn the shower back on and rinse well to get CHG soap off your body. Pat your skin dry with a clean, dry towel. Do not apply lotions or moisturizer. Put on clean clothes and sleep on fresh bed sheets the night before surgery. Do not allow pets to sleep with you. Eating and Drinking Before Surgery    You may eat a regular dinner at the usual time on the day before your surgery. Do NOT eat any solid foods after midnight unless your arrival time at the hospital is 3pm or later. You may drink clear liquids only from 12 midnight until 1 hours prior to your arrival time at the hospital on the day of your surgery. Do NOT drink alcohol.   Clear liquids include:  Water  Fruit juices without pulp( i.e. apple juice)  Carbonated beverages  Black coffee (no cream/milk)  Tea (no cream/milk)  Gatorade  You may drink up to 12-16 ounces at one time every 4 hours between the hours of midnight and 1 hour before your arrival time at the hospital. Example- if your arrival time at the hospital is 6am, you may drink 12-16 ounces of clear liquids no later than 5am.  If your arrival time at the hospital is 3pm or later, you may eat a light breakfast before 8am.  A light breakfast includes: Toast or bagel (no butter)  Black coffee (no cream/milk)  Tea (no cream/milk)  Fruit juices without pulp ( i.e. apple juice)  Do NOT eat meat, eggs, vegetables or fruit  If you have any questions, please contact your surgeon's office. Patient Information Regarding COVID Restrictions    Day of Procedure    Please park in the parking deck or any designated visitor parking lot. Enter the facility through the Main Entrance of the hospital.  On the day of surgery, please provide the cell phone number of the person who will be waiting for you to the Patient Access representative at the time of registration. Please wear a mask on the day of your procedure. We are now allowing two designated visitors per stay. Pediatric patients may have 2 designated visitors. These two people may come in with you on the day of your procedure. The designated visitor must also wear a mask. Once your procedure and the immediate recovery period is completed, a nurse in the recovery area will contact your designated visitor to inform them of your room number or to otherwise review other pertinent information regarding your care. Social distancing practices are to be adhered to in waiting areas and the cafeteria. The patient was contacted in person. She verbalized understanding of all instructions does not  need reinforcement.

## 2022-09-10 LAB
BACTERIA SPEC CULT: NORMAL
BACTERIA SPEC CULT: NORMAL
SERVICE CMNT-IMP: NORMAL
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
## 2022-09-12 NOTE — PERIOP NOTES
PAT Nurse Practitioner   Pre-Operative Chart Review/Assessment:-ORTHOPEDIC/NEUROSURGICAL SPINE                Patient Name:  Anival Blas                                                           Age:   79 y.o.    :  1955     Today's Date:  2022     Date of PAT:   2022      Date of Surgery:    2022      Procedure(s):  C5-C6 ACDF     Surgeon:   Dr. Harinder Connors                       PLAN:       1)  PCP: Jamari Davis MD        2)  Cardiac Clearance: EKG and METs reviewed. No further cardiac evaluation requested. EKG from PCP on 22 showed NSR. 3)  Diabetic Treatment Consult: Not indicated. A1c-5.3       4)  Sleep Apnea evaluation:  Not indicated. SIN Score 2.       5)  Treatment for MRSA/Staph Aureus: Neg       6)  Additional Concerns: HTN              Vital Signs:         Vitals:    22 0920 22 0931   BP:  (!) 162/77   Pulse:  67   Resp:  18   Temp:  98.1 °F (36.7 °C)   Weight: 72 kg (158 lb 11.7 oz)    Height: 5' 3\" (1.6 m)           Body mass index is 28.12 kg/m².       ____________________________________________  PAST MEDICAL HISTORY  Past Medical History:   Diagnosis Date    Arthritis     High cholesterol     Hypertension     Spine disorder       ____________________________________________  PAST SURGICAL HISTORY  Past Surgical History:   Procedure Laterality Date    HX GYN      FIBROIDECTOMY    HX HEENT      DENTAL     ____________________________________________  HOME MEDICATIONS    Current Outpatient Medications   Medication Sig    aspirin delayed-release 81 mg tablet Take  by mouth daily as needed for Pain. senna-docusate (Senokot-S) 8.6-50 mg per tablet Take 2 Tablets by mouth daily as needed for Constipation. fish oil-dha-epa 1,200-144-216 mg cap Take  by mouth daily. multivitamin (ONE A DAY) tablet Take 1 Tablet by mouth daily. fexofenadine (ALLEGRA) 180 mg tablet Take 180 mg by mouth daily as needed for Allergies.     baclofen (LIORESAL) 20 mg tablet TAKE 1 TABLET BY MOUTH THREE TIMES DAILY AS NEEDED FOR PAIN    atorvastatin (LIPITOR) 20 mg tablet Take 20 mg by mouth daily. amLODIPine (NORVASC) 10 mg tablet Take  by mouth. No current facility-administered medications for this encounter.      ____________________________________________  ALLERGIES  Allergies   Allergen Reactions    Lexapro [Escitalopram] Other (comments)     Elevated BP      ____________________________________________  SOCIAL HISTORY  Social History     Tobacco Use    Smoking status: Never    Smokeless tobacco: Never   Substance Use Topics    Alcohol use: Yes     Comment: rare      ____________________________________________   Internal Administration   First Dose COVID-19, PFIZER PURPLE top, DILUTE for use, (age 15 y+), IM, 30mcg/0.3mL  03/06/2021   Second Dose COVID-19, PFIZER PURPLE top, DILUTE for use, (age 15 y+), IM, 30mcg/0.3mL  03/27/2021      Last COVID Lab No results found for: Roise Luke, RCV2CT, CVD2M, COV2, XPLCVT, [id number], [id number], COVV, XJCOVT, COVO4M, COVPCR, [id number]               ____________________________________________    Labs:     CBC, BMP, Hgb A1c, PT/INR and EKG on chart.     Hospital Outpatient Visit on 09/09/2022   Component Date Value Ref Range Status    Crossmatch Expiration 09/09/2022 09/19/2022,2359   Final    ABO/Rh(D) 09/09/2022 A POSITIVE   Final    Antibody screen 09/09/2022 NEG   Final    Color 09/09/2022 YELLOW/STRAW    Final    Color Reference Range: Straw, Yellow or Dark Yellow    Appearance 09/09/2022 CLEAR  CLEAR   Final    Specific gravity 09/09/2022 1.008  1.003 - 1.030   Final    pH (UA) 09/09/2022 6.0  5.0 - 8.0   Final    Protein 09/09/2022 Negative  NEG mg/dL Final    Glucose 09/09/2022 Negative  NEG mg/dL Final    Ketone 09/09/2022 Negative  NEG mg/dL Final    Bilirubin 09/09/2022 Negative  NEG   Final    Blood 09/09/2022 Negative  NEG   Final    Urobilinogen 09/09/2022 0.2  0.2 - 1.0 EU/dL Final    Nitrites 09/09/2022 Negative  NEG   Final    Leukocyte Esterase 09/09/2022 Negative  NEG   Final    UA:UC IF INDICATED 09/09/2022 CULTURE NOT INDICATED BY UA RESULT  CNI   Final    WBC 09/09/2022 0-4  0 - 4 /hpf Final    RBC 09/09/2022 0-5  0 - 5 /hpf Final    Epithelial cells 09/09/2022 FEW  FEW /lpf Final    Epithelial cell category consists of squamous cells and /or transitional urothelial cells. Renal tubular cells, if present, are separately identified as such. Bacteria 09/09/2022 Negative  NEG /hpf Final    Hyaline cast 09/09/2022 0-2  0 - 5 /lpf Final    Special Requests: 09/09/2022 NO SPECIAL REQUESTS    Final    Culture result: 09/09/2022 MRSA NOT PRESENT    Final    Culture result: 09/09/2022     Final                    Value:Screening of patient nares for MRSA is for surveillance purposes and, if positive, to facilitate isolation considerations in high risk settings. It is not intended for automatic decolonization interventions per se as regimens are not sufficiently effective to warrant routine use. Skin:     Denies open wounds, cuts, sores, rashes or other areas of concern in PAT assessment.         Linda Johnston NP
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
## 2022-09-16 ENCOUNTER — ANESTHESIA (OUTPATIENT)
Dept: SURGERY | Age: 67
End: 2022-09-16
Payer: COMMERCIAL

## 2022-09-16 ENCOUNTER — ANESTHESIA EVENT (OUTPATIENT)
Dept: SURGERY | Age: 67
End: 2022-09-16
Payer: COMMERCIAL

## 2022-09-16 ENCOUNTER — HOSPITAL ENCOUNTER (OUTPATIENT)
Age: 67
Discharge: HOME HEALTH | End: 2022-09-18
Attending: ORTHOPAEDIC SURGERY | Admitting: ORTHOPAEDIC SURGERY
Payer: COMMERCIAL

## 2022-09-16 ENCOUNTER — APPOINTMENT (OUTPATIENT)
Dept: GENERAL RADIOLOGY | Age: 67
End: 2022-09-16
Attending: ORTHOPAEDIC SURGERY
Payer: COMMERCIAL

## 2022-09-16 DIAGNOSIS — Z98.1 S/P CERVICAL SPINAL FUSION: ICD-10-CM

## 2022-09-16 DIAGNOSIS — M48.02 CERVICAL STENOSIS OF SPINE: Primary | ICD-10-CM

## 2022-09-16 LAB
GLUCOSE BLD STRIP.AUTO-MCNC: 99 MG/DL (ref 65–117)
SERVICE CMNT-IMP: NORMAL

## 2022-09-16 PROCEDURE — C1889 IMPLANT/INSERT DEVICE, NOC: HCPCS | Performed by: ORTHOPAEDIC SURGERY

## 2022-09-16 PROCEDURE — 74011250637 HC RX REV CODE- 250/637: Performed by: PHYSICIAN ASSISTANT

## 2022-09-16 PROCEDURE — C1713 ANCHOR/SCREW BN/BN,TIS/BN: HCPCS | Performed by: ORTHOPAEDIC SURGERY

## 2022-09-16 PROCEDURE — 74011000258 HC RX REV CODE- 258: Performed by: ANESTHESIOLOGY

## 2022-09-16 PROCEDURE — C1821 INTERSPINOUS IMPLANT: HCPCS | Performed by: ORTHOPAEDIC SURGERY

## 2022-09-16 PROCEDURE — 77030008684 HC TU ET CUF COVD -B: Performed by: ANESTHESIOLOGY

## 2022-09-16 PROCEDURE — 2709999900 HC NON-CHARGEABLE SUPPLY: Performed by: ORTHOPAEDIC SURGERY

## 2022-09-16 PROCEDURE — 22853 INSJ BIOMECHANICAL DEVICE: CPT | Performed by: PHYSICIAN ASSISTANT

## 2022-09-16 PROCEDURE — 22551 ARTHRD ANT NTRBDY CERVICAL: CPT | Performed by: PHYSICIAN ASSISTANT

## 2022-09-16 PROCEDURE — 74011250636 HC RX REV CODE- 250/636

## 2022-09-16 PROCEDURE — 77030003832 HC BIT DRL ATLNTS MEDT -B: Performed by: ORTHOPAEDIC SURGERY

## 2022-09-16 PROCEDURE — 77030026438 HC STYL ET INTUB CARD -A: Performed by: ANESTHESIOLOGY

## 2022-09-16 PROCEDURE — 77030040506 HC DRN WND MDII -A: Performed by: ORTHOPAEDIC SURGERY

## 2022-09-16 PROCEDURE — 76060000034 HC ANESTHESIA 1.5 TO 2 HR: Performed by: ORTHOPAEDIC SURGERY

## 2022-09-16 PROCEDURE — 74011000250 HC RX REV CODE- 250: Performed by: PHYSICIAN ASSISTANT

## 2022-09-16 PROCEDURE — 77030004391 HC BUR FLUT MEDT -C: Performed by: ORTHOPAEDIC SURGERY

## 2022-09-16 PROCEDURE — 76210000017 HC OR PH I REC 1.5 TO 2 HR: Performed by: ORTHOPAEDIC SURGERY

## 2022-09-16 PROCEDURE — 22845 INSERT SPINE FIXATION DEVICE: CPT | Performed by: PHYSICIAN ASSISTANT

## 2022-09-16 PROCEDURE — 74011000250 HC RX REV CODE- 250: Performed by: ANESTHESIOLOGY

## 2022-09-16 PROCEDURE — 74011250636 HC RX REV CODE- 250/636: Performed by: PHYSICIAN ASSISTANT

## 2022-09-16 PROCEDURE — 76010000162 HC OR TIME 1.5 TO 2 HR INTENSV-TIER 1: Performed by: ORTHOPAEDIC SURGERY

## 2022-09-16 PROCEDURE — 77030029099 HC BN WAX SSPC -A: Performed by: ORTHOPAEDIC SURGERY

## 2022-09-16 PROCEDURE — 74011000250 HC RX REV CODE- 250: Performed by: ORTHOPAEDIC SURGERY

## 2022-09-16 PROCEDURE — 22853 INSJ BIOMECHANICAL DEVICE: CPT | Performed by: ORTHOPAEDIC SURGERY

## 2022-09-16 PROCEDURE — 77030037713 HC CLOSR DEV INCIS ZIP STRY -B: Performed by: ORTHOPAEDIC SURGERY

## 2022-09-16 PROCEDURE — 77030038600 HC TU BPLR IRR DISP STRY -B: Performed by: ORTHOPAEDIC SURGERY

## 2022-09-16 PROCEDURE — 82962 GLUCOSE BLOOD TEST: CPT

## 2022-09-16 PROCEDURE — 74011250636 HC RX REV CODE- 250/636: Performed by: ANESTHESIOLOGY

## 2022-09-16 PROCEDURE — 22845 INSERT SPINE FIXATION DEVICE: CPT | Performed by: ORTHOPAEDIC SURGERY

## 2022-09-16 PROCEDURE — 74011250637 HC RX REV CODE- 250/637: Performed by: ANESTHESIOLOGY

## 2022-09-16 PROCEDURE — 22551 ARTHRD ANT NTRBDY CERVICAL: CPT | Performed by: ORTHOPAEDIC SURGERY

## 2022-09-16 DEVICE — IMPLANTABLE DEVICE: Type: IMPLANTABLE DEVICE | Site: SPINE CERVICAL | Status: FUNCTIONAL

## 2022-09-16 DEVICE — PLATE 3001017 ZEVO 17MM 1 LVL
Type: IMPLANTABLE DEVICE | Site: SPINE CERVICAL | Status: FUNCTIONAL
Brand: ZEVO™ ANTERIOR CERVICAL PLATE SYSTEM

## 2022-09-16 DEVICE — GRAFT HUM TISS 3X4 CM WND COVERING AMNIO MEMBRN VERSASHIELD: Type: IMPLANTABLE DEVICE | Site: SPINE CERVICAL | Status: FUNCTIONAL

## 2022-09-16 DEVICE — GRAFT BNE SUB SM CANC FRZN MORSELIZED W/ VIABLE CELL: Type: IMPLANTABLE DEVICE | Site: SPINE CERVICAL | Status: FUNCTIONAL

## 2022-09-16 RX ORDER — LIDOCAINE HYDROCHLORIDE 20 MG/ML
INJECTION, SOLUTION EPIDURAL; INFILTRATION; INTRACAUDAL; PERINEURAL AS NEEDED
Status: DISCONTINUED | OUTPATIENT
Start: 2022-09-16 | End: 2022-09-16 | Stop reason: HOSPADM

## 2022-09-16 RX ORDER — ACETAMINOPHEN 325 MG/1
650 TABLET ORAL ONCE
Status: COMPLETED | OUTPATIENT
Start: 2022-09-16 | End: 2022-09-16

## 2022-09-16 RX ORDER — LIDOCAINE HYDROCHLORIDE 10 MG/ML
0.1 INJECTION, SOLUTION EPIDURAL; INFILTRATION; INTRACAUDAL; PERINEURAL AS NEEDED
Status: DISCONTINUED | OUTPATIENT
Start: 2022-09-16 | End: 2022-09-16 | Stop reason: HOSPADM

## 2022-09-16 RX ORDER — AMLODIPINE BESYLATE 5 MG/1
10 TABLET ORAL DAILY
Status: DISCONTINUED | OUTPATIENT
Start: 2022-09-17 | End: 2022-09-18 | Stop reason: HOSPADM

## 2022-09-16 RX ORDER — FACIAL-BODY WIPES
10 EACH TOPICAL DAILY PRN
Status: DISCONTINUED | OUTPATIENT
Start: 2022-09-18 | End: 2022-09-18 | Stop reason: HOSPADM

## 2022-09-16 RX ORDER — SUCCINYLCHOLINE CHLORIDE 20 MG/ML
INJECTION INTRAMUSCULAR; INTRAVENOUS AS NEEDED
Status: DISCONTINUED | OUTPATIENT
Start: 2022-09-16 | End: 2022-09-16 | Stop reason: HOSPADM

## 2022-09-16 RX ORDER — FENTANYL CITRATE 50 UG/ML
INJECTION, SOLUTION INTRAMUSCULAR; INTRAVENOUS AS NEEDED
Status: DISCONTINUED | OUTPATIENT
Start: 2022-09-16 | End: 2022-09-16 | Stop reason: HOSPADM

## 2022-09-16 RX ORDER — KETOROLAC TROMETHAMINE 30 MG/ML
INJECTION, SOLUTION INTRAMUSCULAR; INTRAVENOUS
Status: COMPLETED
Start: 2022-09-16 | End: 2022-09-16

## 2022-09-16 RX ORDER — SODIUM CHLORIDE 0.9 % (FLUSH) 0.9 %
5-40 SYRINGE (ML) INJECTION EVERY 8 HOURS
Status: DISCONTINUED | OUTPATIENT
Start: 2022-09-16 | End: 2022-09-18 | Stop reason: HOSPADM

## 2022-09-16 RX ORDER — SODIUM CHLORIDE 9 MG/ML
125 INJECTION, SOLUTION INTRAVENOUS CONTINUOUS
Status: DISPENSED | OUTPATIENT
Start: 2022-09-16 | End: 2022-09-17

## 2022-09-16 RX ORDER — CYCLOBENZAPRINE HCL 10 MG
10 TABLET ORAL
Status: DISCONTINUED | OUTPATIENT
Start: 2022-09-16 | End: 2022-09-18 | Stop reason: HOSPADM

## 2022-09-16 RX ORDER — SODIUM CHLORIDE 0.9 % (FLUSH) 0.9 %
5-40 SYRINGE (ML) INJECTION AS NEEDED
Status: DISCONTINUED | OUTPATIENT
Start: 2022-09-16 | End: 2022-09-16 | Stop reason: HOSPADM

## 2022-09-16 RX ORDER — BACLOFEN 10 MG/1
20 TABLET ORAL 3 TIMES DAILY
Status: DISCONTINUED | OUTPATIENT
Start: 2022-09-16 | End: 2022-09-18 | Stop reason: HOSPADM

## 2022-09-16 RX ORDER — ASPIRIN 81 MG/1
81 TABLET ORAL DAILY
Status: DISCONTINUED | OUTPATIENT
Start: 2022-09-17 | End: 2022-09-18 | Stop reason: HOSPADM

## 2022-09-16 RX ORDER — OXYCODONE HYDROCHLORIDE 5 MG/1
5 TABLET ORAL
Status: DISCONTINUED | OUTPATIENT
Start: 2022-09-16 | End: 2022-09-18 | Stop reason: HOSPADM

## 2022-09-16 RX ORDER — MIDAZOLAM HYDROCHLORIDE 1 MG/ML
1 INJECTION, SOLUTION INTRAMUSCULAR; INTRAVENOUS AS NEEDED
Status: DISCONTINUED | OUTPATIENT
Start: 2022-09-16 | End: 2022-09-16 | Stop reason: HOSPADM

## 2022-09-16 RX ORDER — ATORVASTATIN CALCIUM 20 MG/1
20 TABLET, FILM COATED ORAL DAILY
Status: DISCONTINUED | OUTPATIENT
Start: 2022-09-17 | End: 2022-09-18 | Stop reason: HOSPADM

## 2022-09-16 RX ORDER — SODIUM CHLORIDE, SODIUM LACTATE, POTASSIUM CHLORIDE, CALCIUM CHLORIDE 600; 310; 30; 20 MG/100ML; MG/100ML; MG/100ML; MG/100ML
50 INJECTION, SOLUTION INTRAVENOUS CONTINUOUS
Status: DISCONTINUED | OUTPATIENT
Start: 2022-09-16 | End: 2022-09-16 | Stop reason: HOSPADM

## 2022-09-16 RX ORDER — ONDANSETRON 2 MG/ML
INJECTION INTRAMUSCULAR; INTRAVENOUS AS NEEDED
Status: DISCONTINUED | OUTPATIENT
Start: 2022-09-16 | End: 2022-09-16 | Stop reason: HOSPADM

## 2022-09-16 RX ORDER — FENTANYL CITRATE 50 UG/ML
50 INJECTION, SOLUTION INTRAMUSCULAR; INTRAVENOUS AS NEEDED
Status: DISCONTINUED | OUTPATIENT
Start: 2022-09-16 | End: 2022-09-16 | Stop reason: HOSPADM

## 2022-09-16 RX ORDER — GABAPENTIN 100 MG/1
100 CAPSULE ORAL 3 TIMES DAILY
Status: DISCONTINUED | OUTPATIENT
Start: 2022-09-16 | End: 2022-09-18 | Stop reason: HOSPADM

## 2022-09-16 RX ORDER — CETIRIZINE HCL 10 MG
10 TABLET ORAL DAILY
Status: DISCONTINUED | OUTPATIENT
Start: 2022-09-17 | End: 2022-09-18 | Stop reason: HOSPADM

## 2022-09-16 RX ORDER — OMEGA-3-ACID ETHYL ESTERS 1 G/1
1 CAPSULE, LIQUID FILLED ORAL DAILY
Status: DISCONTINUED | OUTPATIENT
Start: 2022-09-17 | End: 2022-09-18 | Stop reason: HOSPADM

## 2022-09-16 RX ORDER — MIDAZOLAM HYDROCHLORIDE 1 MG/ML
INJECTION, SOLUTION INTRAMUSCULAR; INTRAVENOUS AS NEEDED
Status: DISCONTINUED | OUTPATIENT
Start: 2022-09-16 | End: 2022-09-16 | Stop reason: HOSPADM

## 2022-09-16 RX ORDER — THERA TABS 400 MCG
1 TAB ORAL DAILY
Status: DISCONTINUED | OUTPATIENT
Start: 2022-09-17 | End: 2022-09-18 | Stop reason: HOSPADM

## 2022-09-16 RX ORDER — AMOXICILLIN 250 MG
1 CAPSULE ORAL 2 TIMES DAILY
Status: DISCONTINUED | OUTPATIENT
Start: 2022-09-16 | End: 2022-09-18 | Stop reason: HOSPADM

## 2022-09-16 RX ORDER — ROCURONIUM BROMIDE 10 MG/ML
INJECTION, SOLUTION INTRAVENOUS AS NEEDED
Status: DISCONTINUED | OUTPATIENT
Start: 2022-09-16 | End: 2022-09-16 | Stop reason: HOSPADM

## 2022-09-16 RX ORDER — DIPHENHYDRAMINE HYDROCHLORIDE 50 MG/ML
12.5 INJECTION, SOLUTION INTRAMUSCULAR; INTRAVENOUS
Status: ACTIVE | OUTPATIENT
Start: 2022-09-16 | End: 2022-09-17

## 2022-09-16 RX ORDER — KETOROLAC TROMETHAMINE 30 MG/ML
30 INJECTION, SOLUTION INTRAMUSCULAR; INTRAVENOUS
Status: COMPLETED | OUTPATIENT
Start: 2022-09-16 | End: 2022-09-16

## 2022-09-16 RX ORDER — HYDROMORPHONE HYDROCHLORIDE 1 MG/ML
0.2 INJECTION, SOLUTION INTRAMUSCULAR; INTRAVENOUS; SUBCUTANEOUS
Status: DISCONTINUED | OUTPATIENT
Start: 2022-09-16 | End: 2022-09-16 | Stop reason: HOSPADM

## 2022-09-16 RX ORDER — ONDANSETRON 2 MG/ML
4 INJECTION INTRAMUSCULAR; INTRAVENOUS AS NEEDED
Status: DISCONTINUED | OUTPATIENT
Start: 2022-09-16 | End: 2022-09-16 | Stop reason: HOSPADM

## 2022-09-16 RX ORDER — SODIUM CHLORIDE 0.9 % (FLUSH) 0.9 %
5-40 SYRINGE (ML) INJECTION EVERY 8 HOURS
Status: DISCONTINUED | OUTPATIENT
Start: 2022-09-16 | End: 2022-09-16 | Stop reason: HOSPADM

## 2022-09-16 RX ORDER — OXYCODONE HYDROCHLORIDE 5 MG/1
5-10 TABLET ORAL
Qty: 50 TABLET | Refills: 0 | Status: SHIPPED | OUTPATIENT
Start: 2022-09-16 | End: 2022-09-23

## 2022-09-16 RX ORDER — ACETAMINOPHEN 500 MG
1000 TABLET ORAL EVERY 6 HOURS
Status: DISCONTINUED | OUTPATIENT
Start: 2022-09-16 | End: 2022-09-18 | Stop reason: HOSPADM

## 2022-09-16 RX ORDER — ONDANSETRON 2 MG/ML
4 INJECTION INTRAMUSCULAR; INTRAVENOUS
Status: DISPENSED | OUTPATIENT
Start: 2022-09-16 | End: 2022-09-17

## 2022-09-16 RX ORDER — PROPOFOL 10 MG/ML
INJECTION, EMULSION INTRAVENOUS AS NEEDED
Status: DISCONTINUED | OUTPATIENT
Start: 2022-09-16 | End: 2022-09-16 | Stop reason: HOSPADM

## 2022-09-16 RX ORDER — AMOXICILLIN 250 MG
2 CAPSULE ORAL
Status: DISCONTINUED | OUTPATIENT
Start: 2022-09-16 | End: 2022-09-16 | Stop reason: SDUPTHER

## 2022-09-16 RX ORDER — HYDROMORPHONE HYDROCHLORIDE 1 MG/ML
0.5 INJECTION, SOLUTION INTRAMUSCULAR; INTRAVENOUS; SUBCUTANEOUS
Status: ACTIVE | OUTPATIENT
Start: 2022-09-16 | End: 2022-09-17

## 2022-09-16 RX ORDER — DEXAMETHASONE SODIUM PHOSPHATE 4 MG/ML
INJECTION, SOLUTION INTRA-ARTICULAR; INTRALESIONAL; INTRAMUSCULAR; INTRAVENOUS; SOFT TISSUE AS NEEDED
Status: DISCONTINUED | OUTPATIENT
Start: 2022-09-16 | End: 2022-09-16 | Stop reason: HOSPADM

## 2022-09-16 RX ORDER — KETOROLAC TROMETHAMINE 30 MG/ML
30 INJECTION, SOLUTION INTRAMUSCULAR; INTRAVENOUS
Status: DISCONTINUED | OUTPATIENT
Start: 2022-09-16 | End: 2022-09-16

## 2022-09-16 RX ORDER — NALOXONE HYDROCHLORIDE 0.4 MG/ML
0.4 INJECTION, SOLUTION INTRAMUSCULAR; INTRAVENOUS; SUBCUTANEOUS AS NEEDED
Status: DISCONTINUED | OUTPATIENT
Start: 2022-09-16 | End: 2022-09-18 | Stop reason: HOSPADM

## 2022-09-16 RX ORDER — FENTANYL CITRATE 50 UG/ML
25 INJECTION, SOLUTION INTRAMUSCULAR; INTRAVENOUS
Status: DISCONTINUED | OUTPATIENT
Start: 2022-09-16 | End: 2022-09-16 | Stop reason: HOSPADM

## 2022-09-16 RX ORDER — SODIUM CHLORIDE 0.9 % (FLUSH) 0.9 %
5-40 SYRINGE (ML) INJECTION AS NEEDED
Status: DISCONTINUED | OUTPATIENT
Start: 2022-09-16 | End: 2022-09-18 | Stop reason: HOSPADM

## 2022-09-16 RX ORDER — OXYCODONE HYDROCHLORIDE 5 MG/1
10 TABLET ORAL
Status: DISCONTINUED | OUTPATIENT
Start: 2022-09-16 | End: 2022-09-18 | Stop reason: HOSPADM

## 2022-09-16 RX ORDER — POLYETHYLENE GLYCOL 3350 17 G/17G
17 POWDER, FOR SOLUTION ORAL DAILY
Status: DISCONTINUED | OUTPATIENT
Start: 2022-09-17 | End: 2022-09-18 | Stop reason: HOSPADM

## 2022-09-16 RX ADMIN — OXYCODONE 10 MG: 5 TABLET ORAL at 21:48

## 2022-09-16 RX ADMIN — WATER 2 G: 1 INJECTION INTRAMUSCULAR; INTRAVENOUS; SUBCUTANEOUS at 10:49

## 2022-09-16 RX ADMIN — BACLOFEN 20 MG: 10 TABLET ORAL at 21:48

## 2022-09-16 RX ADMIN — DEXAMETHASONE SODIUM PHOSPHATE 4 MG: 4 INJECTION, SOLUTION INTRAMUSCULAR; INTRAVENOUS at 10:49

## 2022-09-16 RX ADMIN — ACETAMINOPHEN 1000 MG: 500 TABLET, FILM COATED ORAL at 21:48

## 2022-09-16 RX ADMIN — SODIUM CHLORIDE, PRESERVATIVE FREE 10 ML: 5 INJECTION INTRAVENOUS at 14:27

## 2022-09-16 RX ADMIN — SUCCINYLCHOLINE CHLORIDE 140 MG: 20 INJECTION, SOLUTION INTRAMUSCULAR; INTRAVENOUS at 10:29

## 2022-09-16 RX ADMIN — ACETAMINOPHEN 650 MG: 325 TABLET, FILM COATED ORAL at 08:54

## 2022-09-16 RX ADMIN — SODIUM CHLORIDE, POTASSIUM CHLORIDE, SODIUM LACTATE AND CALCIUM CHLORIDE 50 ML/HR: 600; 310; 30; 20 INJECTION, SOLUTION INTRAVENOUS at 08:53

## 2022-09-16 RX ADMIN — PHENYLEPHRINE HYDROCHLORIDE 40 MCG/MIN: 10 INJECTION INTRAVENOUS at 10:37

## 2022-09-16 RX ADMIN — PROPOFOL 50 MG: 10 INJECTION, EMULSION INTRAVENOUS at 11:08

## 2022-09-16 RX ADMIN — PROPOFOL 150 MG: 10 INJECTION, EMULSION INTRAVENOUS at 10:29

## 2022-09-16 RX ADMIN — ONDANSETRON 4 MG: 2 INJECTION INTRAMUSCULAR; INTRAVENOUS at 14:27

## 2022-09-16 RX ADMIN — MIDAZOLAM HYDROCHLORIDE 1 MG: 1 INJECTION, SOLUTION INTRAMUSCULAR; INTRAVENOUS at 10:16

## 2022-09-16 RX ADMIN — ROCURONIUM BROMIDE 10 MG: 10 SOLUTION INTRAVENOUS at 10:29

## 2022-09-16 RX ADMIN — ACETAMINOPHEN 1000 MG: 500 TABLET, FILM COATED ORAL at 14:27

## 2022-09-16 RX ADMIN — KETOROLAC TROMETHAMINE 30 MG: 30 INJECTION, SOLUTION INTRAMUSCULAR; INTRAVENOUS at 12:59

## 2022-09-16 RX ADMIN — SODIUM CHLORIDE 200 MCG: 9 INJECTION INTRAMUSCULAR; INTRAVENOUS; SUBCUTANEOUS at 10:29

## 2022-09-16 RX ADMIN — SODIUM CHLORIDE 125 ML/HR: 9 INJECTION, SOLUTION INTRAVENOUS at 12:35

## 2022-09-16 RX ADMIN — SODIUM CHLORIDE, PRESERVATIVE FREE 10 ML: 5 INJECTION INTRAVENOUS at 22:00

## 2022-09-16 RX ADMIN — SODIUM CHLORIDE 200 MCG: 9 INJECTION INTRAMUSCULAR; INTRAVENOUS; SUBCUTANEOUS at 10:37

## 2022-09-16 RX ADMIN — SUGAMMADEX 200 MG: 100 INJECTION, SOLUTION INTRAVENOUS at 11:45

## 2022-09-16 RX ADMIN — OXYCODONE 10 MG: 5 TABLET ORAL at 14:27

## 2022-09-16 RX ADMIN — PROPOFOL 50 MG: 10 INJECTION, EMULSION INTRAVENOUS at 10:49

## 2022-09-16 RX ADMIN — FENTANYL CITRATE 100 MCG: 50 INJECTION, SOLUTION INTRAMUSCULAR; INTRAVENOUS at 10:29

## 2022-09-16 RX ADMIN — HYDROMORPHONE HYDROCHLORIDE 0.2 MG: 1 INJECTION, SOLUTION INTRAMUSCULAR; INTRAVENOUS; SUBCUTANEOUS at 13:14

## 2022-09-16 RX ADMIN — ONDANSETRON 4 MG: 2 INJECTION INTRAMUSCULAR; INTRAVENOUS at 19:35

## 2022-09-16 RX ADMIN — BACLOFEN 20 MG: 10 TABLET ORAL at 17:38

## 2022-09-16 RX ADMIN — ONDANSETRON HYDROCHLORIDE 4 MG: 2 SOLUTION INTRAMUSCULAR; INTRAVENOUS at 12:36

## 2022-09-16 RX ADMIN — GABAPENTIN 100 MG: 100 CAPSULE ORAL at 21:48

## 2022-09-16 RX ADMIN — ONDANSETRON HYDROCHLORIDE 4 MG: 2 INJECTION, SOLUTION INTRAMUSCULAR; INTRAVENOUS at 11:45

## 2022-09-16 RX ADMIN — CEFAZOLIN 2 G: 1 INJECTION, POWDER, FOR SOLUTION INTRAMUSCULAR; INTRAVENOUS at 19:20

## 2022-09-16 RX ADMIN — MIDAZOLAM 2 MG: 1 INJECTION INTRAMUSCULAR; INTRAVENOUS at 10:18

## 2022-09-16 RX ADMIN — ROCURONIUM BROMIDE 20 MG: 10 SOLUTION INTRAVENOUS at 11:08

## 2022-09-16 RX ADMIN — GABAPENTIN 100 MG: 100 CAPSULE ORAL at 17:39

## 2022-09-16 RX ADMIN — LIDOCAINE HYDROCHLORIDE 100 MG: 20 INJECTION, SOLUTION EPIDURAL; INFILTRATION; INTRACAUDAL; PERINEURAL at 10:29

## 2022-09-16 NOTE — PROGRESS NOTES
Patient assessed for readiness to ambulate. Vital Signs  Level of Consciousness: Alert (0)  Temp: 97.9 °F (36.6 °C)  Temp Source: Oral  Pulse (Heart Rate): (!) 59  Heart Rate Source: Monitor  Cardiac Rhythm: Sinus Rhythm, Sinus Lonny  Resp Rate: 16  BP: 112/62  MAP (Calculated): 79  BP 1 Location: Right upper arm  BP 1 Method: Automatic  BP Patient Position: At rest, Supine  MEWS Score: 1. Patient ambulated with assistance of 1 nurses. Patient ambulated with gait belt and walker. Patient walked to [place]. Patient returned safely to bed.

## 2022-09-16 NOTE — PERIOP NOTES
TRANSFER - OUT REPORT:    Verbal report given to shaka webb(name) on Soham Carter  being transferred to (unit) for routine progression of care       Report consisted of patients Situation, Background, Assessment and   Recommendations(SBAR). Time Pre op antibiotic given:y  Anesthesia Stop time: y    Information from the following report(s) SBAR was reviewed with the receiving nurse. Opportunity for questions and clarification was provided. Is the patient on 02? NO       L/Min        Other     Is the patient on a monitor? NO    Is the nurse transporting with the patient? NO    Surgical Waiting Area notified of patient's transfer from PACU?  YES      The following personal items collected during your admission accompanied patient upon transfer:   Dental Appliance: Dental Appliances:  (patient left partials at home)  Vision: Visual Aid: Glasses (to pacu)  Hearing Aid:    Jewelry:    Clothing: Clothing:  (clothing to [employer])  Other Valuables:    Valuables sent to safe:

## 2022-09-16 NOTE — DISCHARGE INSTRUCTIONS
After Hospital Care Plan:  Discharge Instructions Cervical (Neck) Spine Surgery Dr. Hardik Sumner    Patient Name: Rhonda James    Date of procedure: 9/16/2022  Date of discharge: 9/16/2022    Procedure: Procedure(s):  C5-6 ANTERIOR CERVICAL DISCECTOMY WITH FUSION  PCP: @PCP@    Follow up appointments   -follow up with Dr. Hardik Sumner in 2 weeks. Call [phone] to make an appointment as soon as you get home from the hospital.    When to call your Orthopaedic Surgeon:  -Difficulty swallowing that is worse than when you left the hospital.  -Signs of infection-if your incision is red; continues to have drainage; drainage has a foul odor or if you have a persistent fever over 101 degrees for 24 hours  -nausea or vomiting, severe headache  -changes in sensation in your arms or legs (numbness, tingling, loss of color)  -increased weakness-greater than before your surgery  -severe pain or pain not relieved by medications  -Signs of a blood clot in your leg-calf pain, tenderness, redness, swelling of lower leg    When to call your Primary Care Physician:  -Concerns about medical conditions such as diabetes, high blood pressure, asthma, congestive heart failure  -Call if blood sugars are elevated, persistent headache or dizziness, coughing or congestion, constipation or diarrhea, burning with urination, abnormal heart rate    When to call 911 and go to the nearest emergency room:  -acute onset of chest pain, shortness of breath, difficulty breathing    Activity  - You are going home a well person, be as active as possible. Your only exercise should be walking. Start with short frequent walks and increase your walking distance each day.  -Limit the amount of time you sit to 20-30 minute intervals. Sitting for prolonged periods of time will be uncomfortable for you following surgery.   - Do NOT lift anything over 5 pounds  -From now on, even when lifting light weight, bend with your knees and not your back.  -Do NOT do any neck exercises until you have been instructed by your doctor  -When you are in bed, you may lay on your back or on either side. Do NOT lie on your stomach    Cervical Collar (Aspen Collar)  -You are required to wear your cervical collar at all times; except when showering. You may remove the collar long enough to change the pads when needed and to change your dressing each day. -Do not bend or twist when your collar is off. It is best to have someone assist you when changing the pads and your dressing to prevent you from bending your neck. - Clean the pads on your neck collar every day by hand washing with a mild soap and water. Pat them dry with a towel and lay out to air dry. Do not use heat to dry the pads. Driving  -You may not drive or return to work until instructed by your physician. However, you may ride in the car for short periods of time. Incision Care  Your incision has been closed with absorbable sutures and steri-strips. This will assist with healing. Steri-strips are to remain on your incision for 2 weeks. A dry dressing (ABD and tape) will be placed over it and should be changed daily, for at least the first several days after your surgery. If you have no incisional drainage, you may leave the incision open to air if you wish, still leaving the steri-strips in place. Please make sure to wash your hands prior to touching your dressing. You may take brief showers but do not run the water directly onto the wound. After your shower, blot your incision dry with a soft towel and replace the dry dressing. Do not allow the tape to come in contact with the steri-strips. Do not rub or apply any lotions or ointments to your incision site. Do not soak or scrub your wound. Your steri-strips will be removed during your two week follow-up appointment.  If you experience drainage leaking from underneath the steri-strips or if it peels off before 2 weeks, please contact your orthopedic surgeons office. Showering  -You may shower in approximately 2 days after your surgery.    -Leave the dressing on during your shower. Do NOT allow the water to run directly onto your dressing. Once you get out of the shower, put on a dry dressing.  -Reminder- Make sure you put clean pads on your collar after your shower.    -Do not take a tub bath. Preventing blood clots  -You have been given T.E.D. stockings to wear. Continue to wear these for 7 days after your discharge. Put them on in the morning and take them off at night.    -They are used to increase your circulation and prevent blood clots from forming in your legs  -T. E.D. stockings can be machine washed, temperature not to exceed 160° F (71°C) and machine dried for 15 to 20 minutes, temperature not to exceed 250° F (121°C). Pain management  -Take pain medication as prescribed; decrease the amount you use as your pain lessens  -Do not wait until you are in extreme pain to take your medication.  -Avoid alcoholic beverages while taking pain medication    Pain Medication Safety  DO:  -Read the Medication Guide   -Take your medicine exactly as prescribed   -Store your medicine away from children and in a safe place   -Flush unused medicine down the toilet   -Call your healthcare provider for medical advice about side effects. You may report side effects to FDA at 3-546-FDA-6504.   -Please be aware that many medications contain Tylenol. We do not want you to over medicate so please read the information below as a guide. Do not take more than 4 Grams of Tylenol in a 24 hour period.   (There are 1000 milligrams in one Gram)                                                                                                                                                                                                    Percocet contains 325 mg of Tylenol per tablet (do not take more than 12 tablets in 24 hours)  Lortab contains 500 mg of Tylenol per tablet (do not take more than 8 tablets in 24 hours)  Norco contains 325 mg of Tylenol per tablet (do not take more than 12 tablets in 24 hours). DO NOT:  -Do not give your medicine to others   -Do not take medicine unless it was prescribed for you   -Do not stop taking your medicine without talking to your healthcare provider   -Do not break, chew, crush, dissolve, or inject your medicine. If you cannot  swallow your medicine whole, talk to your healthcare provider.  -Do not drink alcohol while taking this medicine  -Do not take anti-inflammatory medications or aspirin unless instructed by your     Physician. Diet  -resume usual diet; drink plenty of fluids; eat foods high in fiber  -It is important to have regular bowel movements. Pain medications may cause constipation. You may want to take a stool softener (such as Senokot-S or Colace) to prevent constipation. If constipation occurs, take a laxative (such as Dulcolax tablets, Milk of Magnesia, or a suppository). Laxatives should only be used if the above preventable measures have failed and you still have not had a bowel movement after three days.

## 2022-09-16 NOTE — OP NOTES
[address]  OPERATIVE REPORT    Name:  Sang Paez  MR#:  [id number]  :  1955  ACCOUNT #:  [de-identified]  DATE OF SERVICE:  2022    PREOPERATIVE DIAGNOSIS:  Cervical stenosis with cervical myelopathy, C5-6. POSTOPERATIVE DIAGNOSIS:  Cervical stenosis with cervical myelopathy, C5-6    PROCEDURE PERFORMED:  Anterior cervical diskectomy C5-6, anterior interbody fusion C5-6, anterior plate fixation S8-1. SURGEON:  Laura Ervin MD    ASSISTANT:  Geoff Kaminski PA-C    ANESTHESIA:  General    COMPLICATIONS:  None. SPECIMENS REMOVED:  None    IMPLANTS:  Medtronic Zevo and Orthofix Titan Nan Interbody    ESTIMATED BLOOD LOSS:  Minimal.    INDICATIONS:  This is a pleasant 70-year female with chronic neck pain and lower back pain. Workup has revealed a severe disk herniation at C5-6 with severe cord compression and myelomalacia. She is having worsening myelopathy. She is for decompression and stabilization. She understood the risks and benefits as well as limitations, and elected to proceed. PROCEDURE:  The patient was identified and brought to the operative suite and underwent general anesthesia without difficulty. Placed in the supine position. A 10-pound traction across the head with chin strap. Preoperative neuromonitoring was placed, baselines were obtained, and remained stable throughout the surgical procedure. Neck was prepped and draped sterilely. Time-out confirmed. We made a sharp incision directly over the cricoid cartilage. Dissection was carried down to platysma. This was bluntly dissected medial.  We identified the deep cervical fascia on top of the anterior cervical spine. Anterior osteophytes at C5-6 were identified. Longus colli were elevated and deep retractors placed. Lateral fluoroscopy was brought in and spinal needle was used to determine that the appropriate level had been obtained.   We then did an annulotomy, followed by complete diskectomy with removal of disk material with pituitary rongeurs, curved curettes, justian. Posterior vertebral osteophytes were taken down with a Midas bur, in particular the inferior portion of the C5 vertebral body. This allowed access to the posterior disk space, remainder of the disk was removed. Posterior longitudinal ligament was also elevated and resected for central canal decompression. Undercut the C5 and C6 vertebral bodies for further central canal decompression. Bilateral foraminotomies undercutting the uncinate processes as well for decompression of those levels as well. Wounds were thoroughly irrigated. Hemostasis with bipolar cautery. After good central and bilateral foraminal decompression, we did trial spaces with the Orthofix coRankux Mini kings titanium 15 x 15 mm implant with 7 mm height provided good anterior column reconstruction as well as restoration of the foraminal height. We impacted this graft with Odalys allograft and impacted and placed a 2-mm countersinking. Neuromonitoring remained stable. Weight was removed off the head. We then contoured an anterior cervical plate, Medtronic Zevo 17 mm in length. Temporary fixation with threaded pin, followed by 3.5 x 15 mm variable angle screws in C5 and C6. Good fixation was obtained. Locking mechanisms engaged. Final x-rays demonstrated stable fixation. Wounds were thoroughly irrigated. VersaShield was placed over the plate for anti-adhesion. 3-0 Monocryl on the skin. Steri-Strips placed. The patient returned to the PACU in stable condition. The physician assistant was present during the entire operative procedure and assisted in all critical elements of the surgery. No surgical assist or resident was available.       Josi Regan MD      [id number]  D:  09/16/2022 11:49  T:  09/16/2022 16:56  JOB #:  [id number]

## 2022-09-16 NOTE — ANESTHESIA POSTPROCEDURE EVALUATION
Post-Anesthesia Evaluation and Assessment    Patient: Randell Golden MRN: [id number]  SSN: [id number]    YOB: 1955  Age: 79 y.o. Sex: female      I have evaluated the patient and they are stable and ready for discharge from the PACU. Cardiovascular Function/Vital Signs  Visit Vitals  /64   Pulse 60   Temp 36.7 °C (98 °F)   Resp 14   Ht 5' 3\" (1.6 m)   Wt 72 kg (158 lb 11.7 oz)   SpO2 96%   BMI 28.12 kg/m²       Patient is status post General anesthesia for Procedure(s):  C5-6 ANTERIOR CERVICAL DISCECTOMY WITH FUSION. Nausea/Vomiting: None    Postoperative hydration reviewed and adequate. Pain:  Pain Scale 1: Numeric (0 - 10) (09/16/22 1309)  Pain Intensity 1: 6 (09/16/22 1309)   Managed    Neurological Status:   Neuro (WDL): Within Defined Limits (09/16/22 1309)  Neuro  Neurologic State: Alert; Appropriate for age (09/16/22 1309)  Orientation Level: Oriented X4 (09/16/22 1309)  Cognition: Follows commands (09/16/22 1309)  Speech: Appropriate for age;Clear (09/16/22 1309)  LUE Motor Response: Purposeful (09/16/22 1309)  LLE Motor Response: Purposeful (09/16/22 1309)  RUE Motor Response: Purposeful (09/16/22 1309)  RLE Motor Response: Purposeful (09/16/22 1309)   At baseline    Mental Status, Level of Consciousness: Alert and  oriented to person, place, and time    Pulmonary Status:   O2 Device: None (Room air) (09/16/22 1309)   Adequate oxygenation and airway patent    Complications related to anesthesia: None    Post-anesthesia assessment completed.  No concerns    Signed By: Belkis Golden MD     September 16, 2022

## 2022-09-16 NOTE — BRIEF OP NOTE
Brief Postoperative Note    Patient: Anival Blas  YOB: 1955  MRN: [id number]    Date of Procedure: 9/16/2022     Pre-Op Diagnosis: NECK PAIN, CERVICAL STENOSIS OF SPINAL CANAL. SPINAL STENOSIS OF LUMBAR REGION WITH NEUROGENIC CLAUDICATION    Post-Op Diagnosis: Same as preoperative diagnosis. Procedure(s):  C5-6 ANTERIOR CERVICAL DISCECTOMY WITH FUSION    Surgeon(s):  Kayla Nieves MD    Surgical Assistant: Physician Assistant: Esther Euceda PA-C    Anesthesia: General     Estimated Blood Loss (mL): less than 096     Complications: None    Specimens: * No specimens in log *     Implants:   Implant Name Type Inv. Item Serial No.  Lot No. LRB No. Used Action   GRAFT BNE SUB SM CANC FRZN MORSELIZED W/ VIABLE CELL - [id number]  GRAFT BNE SUB SM CANC FRZN MORSELIZED W/ VIABLE CELL [id number] [employer] N/A N/A 1 Implanted   SPACER SPNL 7 MM PEEK TI COMP STRL CONSTRUX MINI [id number] - SN/A  SPACER SPNL 7 MM PEEK TI COMP STRL CONSTRUX MINI [id number] N/A [employer]_WD 001 N/A 1 Implanted   GRAFT HUM TISS 3X4 CM WND COVERING AMNIO MEMBRN VERSASHIELD - [id number]  GRAFT HUM TISS 3X4 CM WND COVERING AMNIO MEMBRN VERSASHIELD [id number] [employer]WD N/A N/A 1 Implanted   SCREW SPNL L15MM DIA3. 5MM ANT CERV TI SELF DRL JANETT ANG - SN/A  SCREW SPNL L15MM DIA3. 5MM ANT CERV TI SELF DRL JANETT ANG N/A MED[employer] SOFAMOR DANEK_WD N/A N/A 4 Implanted   PLATE SPNL A16VC THK1. 9MM 4 H ANT CERV TI LEV 1 ZEVO - SN/A  PLATE SPNL V08LI THK1. 9MM 4 H ANT CERV TI LEV 1 ZEVO N/A [employer] SOFAMOR DANEK_WD N/A N/A 1 Implanted       Drains: * No LDAs found *    Findings: stenosis    Electronically Signed by Sabina Gao PA-C on 9/16/2022 at 12:03 PM

## 2022-09-16 NOTE — PROGRESS NOTES
Medicare Outpatient Observation Notice (MOON)/ [place] Outpatient Observation Notice (Nikhil Stone) provided to patient/representative with verbal explanation of the notice. Time allotted for questions regarding the notice. Patient /representative provided a completed copy of the MOON/VOON notice. Copy placed on bedside chart. Patient is s/p C5-6 anterior cervical discectomy with fusion. Care manager met with patient's  Rubia Gricel [phone] to introduce self and explain role. Patient lives independently with her  wallace single level home with 2 MIKE. Patient uses a cane.  confirmed patient's PCP is Dr Landon German and sees him yearly and uses the [facility] on [address] and [address]. Therapy is recommending HH. CM provided  a list of agencies and he has chosen [facility], referral made through [address] [facility]. Tanesha Nazario RN,Care Management  Care Management Interventions  PCP Verified by CM:  Yes (Dr Paradise Clements )  Transition of Care Consult (CM Consult): [address] [place] [address]: No  Support Systems: Spouse/Significant Other (Spouse Rubia Monsalve [phone])  Confirm Follow Up Transport: Family  The Patient and/or Patient Representative was Provided with a Choice of Provider and Agrees with the Discharge Plan?: Yes  Name of the Patient Representative Who was Provided with a Choice of Provider and Agrees with the Discharge Plan: Spouse Rubia Monsalve  Freedom of Choice List was Provided with Basic Dialogue that Supports the Patient's Individualized Plan of Care/Goals, Treatment Preferences and Shares the Quality Data Associated with the Providers?: Yes  Discharge Location  Patient Expects to be Discharged to[de-identified] Home with home health

## 2022-09-16 NOTE — PROGRESS NOTES
Problem: Complex Spine Procedure:  Day of Surgery  Goal: Activity/Safety  Outcome: Progressing Towards Goal  Goal: Medications  Outcome: Progressing Towards Goal

## 2022-09-16 NOTE — ANESTHESIA PREPROCEDURE EVALUATION
Relevant Problems   No relevant active problems       Anesthetic History   No history of anesthetic complications            Review of Systems / Medical History  Patient summary reviewed, nursing notes reviewed and pertinent labs reviewed    Pulmonary  Within defined limits                 Neuro/Psych   Within defined limits           Cardiovascular    Hypertension          Hyperlipidemia    Exercise tolerance: >4 METS     GI/Hepatic/Renal  Within defined limits              Endo/Other        Arthritis     Other Findings              Physical Exam    Airway  Mallampati: II  TM Distance: 4 - 6 cm  Neck ROM: normal range of motion   Mouth opening: Normal     Cardiovascular    Rhythm: regular  Rate: normal         Dental    Dentition: Poor dentition and Loose teeth     Pulmonary  Breath sounds clear to auscultation               Abdominal  Abdominal exam normal       Other Findings            Anesthetic Plan    ASA: 2  Anesthesia type: general          Induction: Intravenous  Anesthetic plan and risks discussed with: Patient

## 2022-09-17 PROCEDURE — 74011250636 HC RX REV CODE- 250/636: Performed by: PHYSICIAN ASSISTANT

## 2022-09-17 PROCEDURE — 74011250637 HC RX REV CODE- 250/637: Performed by: PHYSICIAN ASSISTANT

## 2022-09-17 PROCEDURE — 74011000250 HC RX REV CODE- 250: Performed by: PHYSICIAN ASSISTANT

## 2022-09-17 RX ADMIN — THERA TABS 1 TABLET: TAB at 08:43

## 2022-09-17 RX ADMIN — ACETAMINOPHEN 1000 MG: 500 TABLET, FILM COATED ORAL at 22:23

## 2022-09-17 RX ADMIN — ATORVASTATIN CALCIUM 20 MG: 20 TABLET, FILM COATED ORAL at 08:43

## 2022-09-17 RX ADMIN — BACLOFEN 20 MG: 10 TABLET ORAL at 22:23

## 2022-09-17 RX ADMIN — SODIUM CHLORIDE 125 ML/HR: 9 INJECTION, SOLUTION INTRAVENOUS at 04:31

## 2022-09-17 RX ADMIN — SODIUM CHLORIDE, PRESERVATIVE FREE 10 ML: 5 INJECTION INTRAVENOUS at 22:23

## 2022-09-17 RX ADMIN — POLYETHYLENE GLYCOL 3350 17 G: 17 POWDER, FOR SOLUTION ORAL at 08:43

## 2022-09-17 RX ADMIN — ASPIRIN 81 MG: 81 TABLET, COATED ORAL at 08:43

## 2022-09-17 RX ADMIN — DOCUSATE SODIUM 50MG AND SENNOSIDES 8.6MG 1 TABLET: 8.6; 5 TABLET, FILM COATED ORAL at 08:43

## 2022-09-17 RX ADMIN — AMLODIPINE BESYLATE 10 MG: 5 TABLET ORAL at 08:43

## 2022-09-17 RX ADMIN — CEFAZOLIN 2 G: 1 INJECTION, POWDER, FOR SOLUTION INTRAMUSCULAR; INTRAVENOUS at 04:31

## 2022-09-17 RX ADMIN — ACETAMINOPHEN 1000 MG: 500 TABLET, FILM COATED ORAL at 11:26

## 2022-09-17 RX ADMIN — BACLOFEN 20 MG: 10 TABLET ORAL at 08:43

## 2022-09-17 RX ADMIN — OMEGA-3-ACID ETHYL ESTERS 1 CAPSULE: 1 CAPSULE, LIQUID FILLED ORAL at 08:52

## 2022-09-17 RX ADMIN — ACETAMINOPHEN 1000 MG: 500 TABLET, FILM COATED ORAL at 15:37

## 2022-09-17 RX ADMIN — ONDANSETRON 4 MG: 2 INJECTION INTRAMUSCULAR; INTRAVENOUS at 01:24

## 2022-09-17 RX ADMIN — BACLOFEN 20 MG: 10 TABLET ORAL at 15:37

## 2022-09-17 RX ADMIN — GABAPENTIN 100 MG: 100 CAPSULE ORAL at 08:43

## 2022-09-17 RX ADMIN — SODIUM CHLORIDE, PRESERVATIVE FREE 10 ML: 5 INJECTION INTRAVENOUS at 06:00

## 2022-09-17 RX ADMIN — ACETAMINOPHEN 1000 MG: 500 TABLET, FILM COATED ORAL at 04:31

## 2022-09-17 RX ADMIN — ONDANSETRON 4 MG: 2 INJECTION INTRAMUSCULAR; INTRAVENOUS at 08:42

## 2022-09-17 RX ADMIN — GABAPENTIN 100 MG: 100 CAPSULE ORAL at 15:37

## 2022-09-17 RX ADMIN — GABAPENTIN 100 MG: 100 CAPSULE ORAL at 22:23

## 2022-09-17 NOTE — PROGRESS NOTES
Problem: Falls - Risk of  Goal: *Absence of Falls  Description: Document Jamar Hensley Fall Risk and appropriate interventions in the flowsheet.   Outcome: Progressing Towards Goal  Note: Fall Risk Interventions:            Medication Interventions: Teach patient to arise slowly, Patient to call before getting OOB                   Problem: Patient Education: Go to Patient Education Activity  Goal: Patient/Family Education  Outcome: Progressing Towards Goal

## 2022-09-17 NOTE — PROGRESS NOTES
Problem: Falls - Risk of  Goal: *Absence of Falls  Description: Document Christina Minium Fall Risk and appropriate interventions in the flowsheet.   Outcome: Progressing Towards Goal  Note: Fall Risk Interventions:            Medication Interventions: Patient to call before getting OOB, Teach patient to arise slowly                   Problem: Complex Spine Procedure:  Post Op Day 1  Goal: Activity/Safety  Outcome: Progressing Towards Goal  Goal: Medications  Outcome: Progressing Towards Goal

## 2022-09-17 NOTE — PROGRESS NOTES
Orthopaedics Daily Progress Note                            Date of Surgery:  9/16/2022      Patient: Berna Forte   YOB: 1955  Age: 79 y.o. SUBJECTIVE:   1 Day Post-Op following C5-6 ANTERIOR CERVICAL DISCECTOMY WITH FUSION. The patient's post operative pain is controlled. No CP/SOB. The patient's mobility will be evaluated today during PT sessions. +nausea    OBJECTIVE:     Vital Signs:    Visit Vitals  /71 (BP 1 Location: Right upper arm, BP Patient Position: At rest;Sitting)   Pulse (!) 55   Temp 97.7 °F (36.5 °C)   Resp 17   Ht 5' 3\" (1.6 m)   Wt 158 lb 11.7 oz (72 kg)   SpO2 98%   BMI 28.12 kg/m²       Physical Exam:  General: A&Ox3. The patient is cooperative, and in no acute distress. Respiratory: Respirations are unlabored. Cervical collar in place. Moving upper extremities. Sensation intact. Laboratory Values:             No results found for this or any previous visit (from the past 12 hour(s)). PLAN:     S/P C5-6 ANTERIOR CERVICAL DISCECTOMY WITH FUSION   -Mobilize and continue with PT/OT until discharged     Hemodynamics Acute blood loss anemia as expected. Patient asymptomatic. Continue to monitor. Wound Monitor postop dressing; no postop dressing changes necessary. Reinforce PRN. Post Operative Pain Pain Control: stable, mild-to-moderate joint symptoms intermittently, reasonably well controlled by current meds. DVT Prophylaxis Continue with SCD'S, Ankle Pump Exercises. Discharge Disposition Discharge plan: pending PT clearance, pain control.        Signed By: Aaliyah Washington PA-C  September 17, 2022 8:08 AM

## 2022-09-18 ENCOUNTER — HOME HEALTH ADMISSION (OUTPATIENT)
Dept: HOME HEALTH SERVICES | Facility: HOME HEALTH | Age: 67
End: 2022-09-18
Payer: COMMERCIAL

## 2022-09-18 VITALS
RESPIRATION RATE: 16 BRPM | WEIGHT: 158.73 LBS | DIASTOLIC BLOOD PRESSURE: 69 MMHG | HEIGHT: 63 IN | BODY MASS INDEX: 28.12 KG/M2 | HEART RATE: 54 BPM | OXYGEN SATURATION: 99 % | TEMPERATURE: 98.3 F | SYSTOLIC BLOOD PRESSURE: 158 MMHG

## 2022-09-18 PROCEDURE — 74011000250 HC RX REV CODE- 250: Performed by: PHYSICIAN ASSISTANT

## 2022-09-18 PROCEDURE — 97161 PT EVAL LOW COMPLEX 20 MIN: CPT

## 2022-09-18 PROCEDURE — 97116 GAIT TRAINING THERAPY: CPT

## 2022-09-18 PROCEDURE — 74011250637 HC RX REV CODE- 250/637: Performed by: PHYSICIAN ASSISTANT

## 2022-09-18 RX ADMIN — ACETAMINOPHEN 1000 MG: 500 TABLET, FILM COATED ORAL at 04:13

## 2022-09-18 RX ADMIN — DOCUSATE SODIUM 50MG AND SENNOSIDES 8.6MG 1 TABLET: 8.6; 5 TABLET, FILM COATED ORAL at 09:32

## 2022-09-18 RX ADMIN — AMLODIPINE BESYLATE 10 MG: 5 TABLET ORAL at 09:31

## 2022-09-18 RX ADMIN — THERA TABS 1 TABLET: TAB at 09:32

## 2022-09-18 RX ADMIN — SODIUM CHLORIDE, PRESERVATIVE FREE 10 ML: 5 INJECTION INTRAVENOUS at 07:01

## 2022-09-18 RX ADMIN — GABAPENTIN 100 MG: 100 CAPSULE ORAL at 09:32

## 2022-09-18 RX ADMIN — POLYETHYLENE GLYCOL 3350 17 G: 17 POWDER, FOR SOLUTION ORAL at 09:33

## 2022-09-18 RX ADMIN — ASPIRIN 81 MG: 81 TABLET, COATED ORAL at 09:32

## 2022-09-18 RX ADMIN — OMEGA-3-ACID ETHYL ESTERS 1 CAPSULE: 1 CAPSULE, LIQUID FILLED ORAL at 09:33

## 2022-09-18 RX ADMIN — BACLOFEN 20 MG: 10 TABLET ORAL at 09:31

## 2022-09-18 RX ADMIN — ATORVASTATIN CALCIUM 20 MG: 20 TABLET, FILM COATED ORAL at 09:31

## 2022-09-18 NOTE — PROGRESS NOTES
Orthopaedics Daily Progress Note                            Date of Surgery:  9/16/2022      Patient: Irene Myers   YOB: 1955  Age: 79 y.o. SUBJECTIVE:   2 Day Post-Op following C5-6 ANTERIOR CERVICAL DISCECTOMY WITH FUSION. PT didn't see her yesterday because no order was placed by primary team on day of surgery. Placed order and spoke with PT/OT. She moved well yesterday. Nausea resolved. Pain controlled. OBJECTIVE:     Vital Signs:    Visit Vitals  BP (!) 158/69 (BP 1 Location: Right upper arm, BP Patient Position: At rest)   Pulse (!) 54   Temp 98.3 °F (36.8 °C)   Resp 16   Ht 5' 3\" (1.6 m)   Wt 158 lb 11.7 oz (72 kg)   SpO2 99%   BMI 28.12 kg/m²       Physical Exam:  General: A&Ox3. The patient is cooperative, and in no acute distress. Respiratory: Respirations are unlabored. Cervical collar in place. Moving upper extremities. Sensation intact. Laboratory Values:             No results found for this or any previous visit (from the past 12 hour(s)). PLAN:     S/P C5-6 ANTERIOR CERVICAL DISCECTOMY WITH FUSION   -Mobilize and continue with PT/OT until discharged     Hemodynamics Acute blood loss anemia as expected. Patient asymptomatic. Continue to monitor. Wound Monitor postop dressing; no postop dressing changes necessary. Reinforce PRN. Post Operative Pain Pain Control: stable, mild-to-moderate joint symptoms intermittently, reasonably well controlled by current meds. DVT Prophylaxis Continue with SCD'S, Ankle Pump Exercises.       Discharge Disposition Discharge plan: today pending PT clearance       Signed By: Toni Bruner PA-C  September 18, 2022 8:08 AM

## 2022-09-18 NOTE — PROGRESS NOTES
Problem: Mobility Impaired (Adult and Pediatric)  Goal: *Acute Goals and Plan of Care (Insert Text)  Description: FUNCTIONAL STATUS PRIOR TO ADMISSION: Patient was modified independent using a single point cane for functional mobility. HOME SUPPORT PRIOR TO ADMISSION: The patient lived with  but did not require assist.    Physical Therapy Goals  Initiated 9/18/2022    1. Patient will move from supine to sit and sit to supine  in bed with modified independence within 4 days. 2. Patient will perform sit to stand with modified independence within 4 days. 3. Patient will ambulate with modified independence for 100 feet with the least restrictive device within 4 days. 4. Patient will ascend/descend 2 stairs with bilateral handrail(s) with modified independence within 4 days. 5. Patient will verbalize and demonstrate understanding of spinal precautions (No bending, lifting greater than 5 lbs, or twisting; log-roll technique; frequent repositioning as instructed) within 4 days. Outcome: Progressing Towards Goal   PHYSICAL THERAPY EVALUATION  Patient: Guillermina Blackman (15 y.o. female)  Date: 9/18/2022  Primary Diagnosis: Cervical stenosis of spine [M48.02]  Procedure(s) (LRB):  C5-6 ANTERIOR CERVICAL DISCECTOMY WITH FUSION (N/A) 2 Days Post-Op   Precautions:   Fall, Spinal  No bending, no lifting greater than 5 lbs, no twisting, log-roll technique, repositioning every 20-30 min except when sleeping, brace when OOB      ASSESSMENT  Based on the objective data described below, the patient presents with decreased activity tolerance, moderate risk for falls, and new implementation of BLT spinal precautions now POD#2 s/p C5-6 ACDF. Patient overall CGA-SBA level for all mobility, and ambulated 55 feet with RW and competed 2 steps with bilateral rails per home set up without instability or LOB.  Educated on BLT spinal precautions, log rolling, changing position every 30 min, and on situational home safety awareness in regards to C-collar and spinal precautions. All VSS on RA during mobility, no orthostatic hypotension or complaint of dizziness today. At this time patient is cleared to return to home with [facility] PT, will keep on caseload for 1-2 more sessions to reinforce spinal precautions and progress ambulation if not discharged. Current Level of Function Impacting Discharge (mobility/balance): ambulates 55 feet with CGA-SBA and RW     Functional Outcome Measure: The patient scored Total Score: 20/28 on the Tinetti outcome measure which is indicative of moderate fall risk. Other factors to consider for discharge: supportive       Patient will benefit from skilled therapy intervention to address the above noted impairments. PLAN :  Recommendations and Planned Interventions: bed mobility training, transfer training, gait training, therapeutic exercises, neuromuscular re-education, patient and family training/education, and therapeutic activities      Frequency/Duration: Patient will be followed by physical therapy:  twice daily to address goals. Recommendation for discharge: (in order for the patient to meet his/her long term goals)  Physical therapy at least 2 days/week in the home AND ensure assist and/or supervision for safety with all mobility    This discharge recommendation:  A follow-up discussion with the attending provider and/or case management is planned    IF patient discharges home will need the following DME: rolling walker - patient states she may have one at home; if not, needs to be ordered          SUBJECTIVE:   Patient stated I'm ready to get out of here.     OBJECTIVE DATA SUMMARY:   HISTORY:    Past Medical History:   Diagnosis Date    Arthritis     High cholesterol     Hypertension     Spine disorder      Past Surgical History:   Procedure Laterality Date    HX GYN      FIBROIDECTOMY    HX HEENT      DENTAL       Personal factors and/or comorbidities impacting plan of care: POD#2 C5-6 ACDF    Home Situation  Home Environment: Private residence  # Steps to Enter: 2  Rails to Enter: Yes  Hand Rails : Bilateral  One/Two Story Residence: One story  Living Alone: No  Support Systems: Spouse/Significant Other  Patient Expects to be Discharged to[de-identified] Home with family assistance  Current DME Used/Available at Home: Cane, straight, Shower chair  Tub or Shower Type: Shower    EXAMINATION/PRESENTATION/DECISION MAKING:   Critical Behavior:  Neurologic State: Alert  Orientation Level: Oriented X4  Cognition: Appropriate decision making  Safety/Judgement: Fall prevention, Home safety  Hearing:     Skin:  intact  Edema: none noted  Range Of Motion:  AROM: Within functional limits (LEs)           PROM: Within functional limits (LEs)           Strength:    Strength: Generally decreased, functional                    Tone & Sensation:   Tone: Normal              Sensation: Intact               Coordination:  Coordination: Generally decreased, functional  Vision:      Functional Mobility:  Bed Mobility:  Rolling: Supervision (cues for log roll)  Supine to Sit: Supervision (cues fo log roll)  Sit to Supine: Supervision (cues for log roll)  Scooting: Independent  Transfers:  Sit to Stand: Stand-by assistance  Stand to Sit: Stand-by assistance        Bed to Chair: Stand-by assistance              Balance:   Sitting: Intact; Without support  Standing: Intact; With support  Ambulation/Gait Training:  Distance (ft): 55 Feet (ft)  Assistive Device: Walker, rolling;Gait belt  Ambulation - Level of Assistance: Stand-by assistance        Gait Abnormalities: Decreased step clearance        Base of Support: Widened     Speed/Kellee: Pace decreased (<100 feet/min)  Step Length: Right shortened;Left shortened                     Stairs:  Number of Stairs Trained: 2  Stairs - Level of Assistance: Contact guard assistance   Rail Use: Both      Functional Measure:  Tinetti test:    Sitting Balance: 1  Arises: 1  Attempts to Rise: 2  Immediate Standing Balance: 1  Standing Balance: 1  Nudged: 1  Eyes Closed: 1  Turn 360 Degrees - Continuous/Discontinuous: 1  Turn 360 Degrees - Steady/Unsteady: 1  Sitting Down: 1  Balance Score: 11 Balance total score  Indication of Gait: 1  R Step Length/Height: 1  L Step Length/Height: 1  R Foot Clearance: 1  L Foot Clearance: 1  Step Symmetry: 1  Step Continuity: 1  Path: 1  Trunk: 0  Walking Time: 1  Gait Score: 9 Gait total score  Total Score: 20/28 Overall total score         Tinetti Tool Score Risk of Falls  <19 = High Fall Risk  19-24 = Moderate Fall Risk  25-28 = Low Fall Risk  Tinetti ME. Performance-Oriented Assessment of Mobility Problems in Elderly Patients. Bone 66; [id number].  (Scoring Description: PT Bulletin Feb. 10, 1993)    Older adults: Gosia Sinha et al, 2009; n = 1000 [facility] elderly evaluated with ABC, EVA, ADL, and IADL)  · Mean EVA score for males aged 69-68 years = 26.21(3.40)  · Mean EVA score for females age 69-68 years = 25.16(4.30)  · Mean EVA score for males over 80 years = 23.29(6.02)  · Mean EVA score for females over 80 years = 17.20(8.32)        Physical Therapy Evaluation Charge Determination   History Examination Presentation Decision-Making   MEDIUM  Complexity : 1-2 comorbidities / personal factors will impact the outcome/ POC  LOW Complexity : 1-2 Standardized tests and measures addressing body structure, function, activity limitation and / or participation in recreation  LOW Complexity : Stable, uncomplicated  Other outcome measures Tinetti  MEDIUM      Based on the above components, the patient evaluation is determined to be of the following complexity level: LOW     Pain Ratin/10    Activity Tolerance:   Good and SpO2 stable on RA      After treatment patient left in no apparent distress:   Sitting in chair, Call bell within reach, Bed / chair alarm activated, and Caregiver / family present    COMMUNICATION/EDUCATION:   The patients plan of care was discussed with: Occupational therapist and Registered nurse. Fall prevention education was provided and the patient/caregiver indicated understanding.     Thank you for this referral.  Dilma Alicia, PT   Time Calculation: 27 mins

## 2022-09-18 NOTE — PROGRESS NOTES
Problem: Falls - Risk of  Goal: *Absence of Falls  Description: Document [place] Flow Fall Risk and appropriate interventions in the flowsheet.   Outcome: Resolved/Met     Problem: Patient Education: Go to Patient Education Activity  Goal: Patient/Family Education  Outcome: Resolved/Met     Problem: Complex Spine Procedure:  Day of Surgery  Goal: Off Pathway (Use only if patient is Off Pathway)  Outcome: Resolved/Met  Goal: Activity/Safety  Outcome: Resolved/Met  Goal: Consults, if ordered  Outcome: Resolved/Met  Goal: Diagnostic Test/Procedures  Outcome: Resolved/Met  Goal: Nutrition/Diet  Outcome: Resolved/Met  Goal: Discharge Planning  Outcome: Resolved/Met  Goal: Medications  Outcome: Resolved/Met  Goal: Respiratory  Outcome: Resolved/Met  Goal: Treatments/Interventions/Procedures  Outcome: Resolved/Met  Goal: Psychosocial  Outcome: Resolved/Met  Goal: *Optimal pain control at patient's stated goal  Outcome: Resolved/Met  Goal: *Demonstrates progressive activity  Outcome: Resolved/Met  Goal: *Respiratory status stable  Outcome: Resolved/Met     Problem: Complex Spine Procedure:  Post Op Day 1  Goal: Off Pathway (Use only if patient is Off Pathway)  Outcome: Resolved/Met  Goal: Activity/Safety  Outcome: Resolved/Met  Goal: Consults, if ordered  Outcome: Resolved/Met  Goal: Diagnostic Test/Procedures  Outcome: Resolved/Met  Goal: Nutrition/Diet  Outcome: Resolved/Met  Goal: Discharge Planning  Outcome: Resolved/Met  Goal: Medications  Outcome: Resolved/Met  Goal: Respiratory  Outcome: Resolved/Met  Goal: Treatments/Interventions/Procedures  Outcome: Resolved/Met  Goal: Psychosocial  Outcome: Resolved/Met  Goal: *Progress independence mobility/activities (eg: Mobility precautions)  Outcome: Resolved/Met  Goal: *Verbalizes/demonstrates understanding of proper body mechanics and use of stabilization device if ordered  Outcome: Resolved/Met  Goal: *Optimal pain control at patient's stated goal  Outcome: Resolved/Met  Goal: *Resumes normal function of bladder and bowel  Outcome: Resolved/Met  Goal: *Hemodynamically stable  Outcome: Resolved/Met  Goal: *Tolerating diet  Outcome: Resolved/Met  Goal: *Labs within defined limits  Outcome: Resolved/Met     Problem: Patient Education: Go to Patient Education Activity  Goal: Patient/Family Education  Outcome: Resolved/Met

## 2022-09-18 NOTE — PROGRESS NOTES
Transition of Care Plan  RUR- Low   DISPOSITION: Home with [facility], confirmed acceptance. , This was placed in AVS  F/U with PCP/Specialist    Transport: family to transport at [address] [place] [address] serve MD to get [facility] orders      ALICIA Santamaria  10:13 AM

## 2022-09-19 ENCOUNTER — HOME CARE VISIT (OUTPATIENT)
Dept: SCHEDULING | Facility: HOME HEALTH | Age: 67
End: 2022-09-19
Payer: COMMERCIAL

## 2022-09-19 VITALS
HEIGHT: 63 IN | BODY MASS INDEX: 28 KG/M2 | RESPIRATION RATE: 16 BRPM | SYSTOLIC BLOOD PRESSURE: 140 MMHG | TEMPERATURE: 97.6 F | DIASTOLIC BLOOD PRESSURE: 60 MMHG | WEIGHT: 158 LBS | OXYGEN SATURATION: 98 % | HEART RATE: 80 BPM

## 2022-09-19 PROCEDURE — 400018 HH-NO PAY CLAIM PROCEDURE

## 2022-09-19 PROCEDURE — 3331090001 HH PPS REVENUE CREDIT

## 2022-09-19 PROCEDURE — G0151 HHCP-SERV OF PT,EA 15 MIN: HCPCS

## 2022-09-19 PROCEDURE — 400013 HH SOC

## 2022-09-19 PROCEDURE — 3331090002 HH PPS REVENUE DEBIT

## 2022-09-20 ENCOUNTER — HOME CARE VISIT (OUTPATIENT)
Dept: SCHEDULING | Facility: HOME HEALTH | Age: 67
End: 2022-09-20
Payer: COMMERCIAL

## 2022-09-20 PROCEDURE — 3331090002 HH PPS REVENUE DEBIT

## 2022-09-20 PROCEDURE — G0152 HHCP-SERV OF OT,EA 15 MIN: HCPCS

## 2022-09-20 PROCEDURE — 3331090001 HH PPS REVENUE CREDIT

## 2022-09-21 VITALS
TEMPERATURE: 97.8 F | DIASTOLIC BLOOD PRESSURE: 60 MMHG | HEART RATE: 78 BPM | SYSTOLIC BLOOD PRESSURE: 136 MMHG | OXYGEN SATURATION: 98 %

## 2022-09-21 PROCEDURE — 3331090002 HH PPS REVENUE DEBIT

## 2022-09-21 PROCEDURE — 3331090001 HH PPS REVENUE CREDIT

## 2022-09-22 ENCOUNTER — HOME CARE VISIT (OUTPATIENT)
Dept: SCHEDULING | Facility: HOME HEALTH | Age: 67
End: 2022-09-22
Payer: COMMERCIAL

## 2022-09-22 VITALS
TEMPERATURE: 97.6 F | RESPIRATION RATE: 16 BRPM | SYSTOLIC BLOOD PRESSURE: 120 MMHG | HEART RATE: 98 BPM | DIASTOLIC BLOOD PRESSURE: 52 MMHG | OXYGEN SATURATION: 98 %

## 2022-09-22 PROCEDURE — G0151 HHCP-SERV OF PT,EA 15 MIN: HCPCS

## 2022-09-22 PROCEDURE — 3331090001 HH PPS REVENUE CREDIT

## 2022-09-22 PROCEDURE — 3331090002 HH PPS REVENUE DEBIT

## 2022-09-23 PROCEDURE — 3331090002 HH PPS REVENUE DEBIT

## 2022-09-23 PROCEDURE — 3331090001 HH PPS REVENUE CREDIT

## 2022-09-24 PROCEDURE — 3331090002 HH PPS REVENUE DEBIT

## 2022-09-24 PROCEDURE — 3331090001 HH PPS REVENUE CREDIT

## 2022-09-25 PROCEDURE — 3331090002 HH PPS REVENUE DEBIT

## 2022-09-25 PROCEDURE — 3331090001 HH PPS REVENUE CREDIT

## 2022-09-26 PROCEDURE — 3331090002 HH PPS REVENUE DEBIT

## 2022-09-26 PROCEDURE — 3331090001 HH PPS REVENUE CREDIT

## 2022-09-27 ENCOUNTER — HOME CARE VISIT (OUTPATIENT)
Dept: SCHEDULING | Facility: HOME HEALTH | Age: 67
End: 2022-09-27
Payer: COMMERCIAL

## 2022-09-27 VITALS
DIASTOLIC BLOOD PRESSURE: 80 MMHG | TEMPERATURE: 97.7 F | OXYGEN SATURATION: 98 % | SYSTOLIC BLOOD PRESSURE: 125 MMHG | RESPIRATION RATE: 16 BRPM | HEART RATE: 60 BPM

## 2022-09-27 PROCEDURE — 3331090002 HH PPS REVENUE DEBIT

## 2022-09-27 PROCEDURE — G0151 HHCP-SERV OF PT,EA 15 MIN: HCPCS

## 2022-09-27 PROCEDURE — 3331090001 HH PPS REVENUE CREDIT

## 2022-09-27 PROCEDURE — 3331090003 HH PPS REVENUE ADJ

## 2022-09-28 ENCOUNTER — OFFICE VISIT (OUTPATIENT)
Dept: ORTHOPEDIC SURGERY | Age: 67
End: 2022-09-28
Payer: COMMERCIAL

## 2022-09-28 VITALS — HEIGHT: 63 IN | BODY MASS INDEX: 28 KG/M2 | WEIGHT: 158 LBS

## 2022-09-28 DIAGNOSIS — Z98.1 S/P CERVICAL SPINAL FUSION: Primary | ICD-10-CM

## 2022-09-28 PROCEDURE — 99024 POSTOP FOLLOW-UP VISIT: CPT | Performed by: PHYSICIAN ASSISTANT

## 2022-09-28 NOTE — PROGRESS NOTES
Nikolay Cornejo (: 1955) is a 79 y.o. female patient here for evaluation of the following chief complaint(s):  Surgical Follow-up (Sx 22 C5/6 ACDF (PO#1))         ASSESSMENT/PLAN:  Below is the assessment and plan developed based on review of pertinent history, physical exam, labs, studies, and medications. 1. S/P cervical spinal fusion  -     XR SPINE CERV PA LAT ODONT 3 V MAX; Future      The patient's radiologic findings have been reviewed with her in detail today. She is doing quite well at this point her postoperative recovery. She will continue wearing her soft collar, and continue to limit her cervical motion and lifting. She may begin to transition from her Rollator to a cane when she feels able. We will see her back for next postoperative follow-up visit in 4 weeks. Return in about 4 weeks (around 10/26/2022) for Post op follow up, With Dr. Renata Yap. SUBJECTIVE/OBJECTIVE:  Nikolay Cornejo (: 1955) is a 79 y.o. female who presents today for the following:  Chief Complaint   Patient presents with    Surgical Follow-up     Sx 22 C5/6 ACDF (PO#1)        Surgical Follow-up     Ms. Seth Urbina presents today for routine postoperative follow-up visit. She is approximately 2 weeks out from her recent cervical fusion. She is doing exceptionally well at this point her postoperative recovery. No significant neck or arm pain. She denies incisional difficulties, and has been tolerating her soft collar well. She has completed home health PT, but does continue to use a Rollator for balance. She is only using an occasional Tylenol. IMAGING:  XR Results (most recent):  Results from Appointment encounter on 22    XR SPINE CERV PA LAT ODONT 3 V MAX    Narrative  AP and lateral films of the cervical spine reveal stable cervical fusion at C5-6 with stable instrumentation.        MRI Results (most recent):  Results from [place] encounter on 22    MRI [facility] CONT    Narrative  EXAM: MRI CERV SPINE WO CONT    INDICATION: Neck pain. COMPARISON: Radiographs 8/22/2012 and 4/10/2008. TECHNIQUE: MR imaging of the cervical spine was performed using the following  sequences: sagittal T1, T2, STIR;  axial T2, T1.    CONTRAST:  None. FINDINGS:    There is diminished transaxial dimension of the cord at the C5-6 level. There is  bilateral paramedian cord T2-STIR signal hyperintensity extending from the mid  C5 through mid C6 levels, more prominent on the right. Vertebral body heights are normal. There is straightening of cervical lordosis  with 2 mm retrolisthesis at C5-6. There are moderately prominent type II  discogenic endplate signal changes centrally and on the left at C5-6. Additionally, there are 12 mm hemangioma incidentally noted at C7 vertebral body  on the right posterolaterally and T1 on the left anterolaterally. No paraspinal or intraspinal mass is shown. .    C2-C3: Mildly diminished is height. Mild disc bulging accentuated in the right  and left lateral regions. Small right uncovertebral osteophytes. No substantial  facet arthrosis. No canal or foraminal stenosis. C3-C4: Mild disc space narrowing. Mild diffuse disc bulging, accentuated in the  left lateral region, and small bilateral uncovertebral osteophytes, larger on  the right. No facet arthrosis. Mild canal stenosis with midline AP canal  dimension of 9 mm. No cord compression. Mild-moderate bilateral foraminal  stenosis. C4-C5: Mild-moderate disc space narrowing. Mild diffuse disc bulge with left  paracentral accentuation. Small bilateral uncovertebral osteophytes. No facet  arthropathy. Mild-moderate canal stenosis with mild cord compression. Moderate  to severe left and moderate right foraminal stenosis. C5-C6: Moderate to severe disc space narrowing. Moderate diffuse disc osteophyte  complex formation and small central disc protrusion. No substantial facet  arthrosis.  Severe canal stenosis with AP canal dimension reduced to 5 mm. Moderate to severe cord compression. Moderate to severe bilateral foraminal  stenosis, greater on the left. C6-C7: Normal disc height. No disc herniation or bulging. No facet arthropathy. No canal or foraminal stenosis. C7-T1: Normal disc height. No disc herniation or bulging. No facet arthropathy. No canal or foraminal stenosis. T1-T2, T2-3 and T3-T4: Shown on sagittal images only. Mild disc space narrowing  at T3-4. No disc herniation or substantial disc bulging. No facet arthropathy or  canal-foraminal stenosis demonstrated. Impression  Multilevel degenerative findings detailed by level above. Note the followin. C5-6 severe canal stenosis with moderate to severe cord compression. 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 2. C4-5 mild-moderate canal stenosis with mild cord compression. 3. C3-4 mild canal stenosis. 4. Foraminal stenosis which is moderate to severe on the left at C4-5, and  moderate to severe bilaterally at C5-6. Allergies   Allergen Reactions    Lexapro [Escitalopram] Other (comments)     Elevated BP       Current Outpatient Medications   Medication Sig    acetaminophen (TYLENOL) 500 mg tablet Take 500 mg by mouth every six (6) hours as needed for Pain. as prescribed by MD    aspirin delayed-release 81 mg tablet Take 81 mg by mouth daily as needed for Pain. as prescribed by MD    senna-docusate (PERICOLACE) 8.6-50 mg per tablet Take 2 Tablets by mouth daily as needed for Constipation. fish oil-dha-epa 1,200-144-216 mg cap Take 1 Capsule by mouth daily. multivitamin (ONE A DAY) tablet Take 1 Tablet by mouth daily. fexofenadine (ALLEGRA) 180 mg tablet Take 180 mg by mouth daily as needed for Allergies. baclofen (LIORESAL) 20 mg tablet TAKE 1 TABLET BY MOUTH THREE TIMES DAILY AS NEEDED FOR PAIN    atorvastatin (LIPITOR) 20 mg tablet Take 20 mg by mouth daily.     amLODIPine (NORVASC) 10 mg tablet Take 10 mg by mouth daily. No current facility-administered medications for this visit. Past Medical History:   Diagnosis Date    Arthritis     High cholesterol     Hypertension     Spine disorder         Past Surgical History:   Procedure Laterality Date    HX GYN      FIBROIDECTOMY    HX HEENT      DENTAL       Family History   Problem Relation Age of Onset    No Known Problems Mother     No Known Problems Father     Diabetes Sister     Heart Attack Brother     Anesth Problems Neg Hx         Social History     Tobacco Use    Smoking status: Never    Smokeless tobacco: Never   Substance Use Topics    Alcohol use: Yes     Comment: rare        Review of Systems   Constitutional: Negative. Respiratory: Negative. Cardiovascular: Negative. Gastrointestinal: Negative. Endocrine: Negative. Genitourinary: Negative. Musculoskeletal:  Positive for neck stiffness. Skin: Negative. Allergic/Immunologic: Negative. Hematological: Negative. Psychiatric/Behavioral: Negative. All other systems reviewed and are negative. Pain Assessment  9/1/2022   Location of Pain Neck;Back   Location Modifiers Posterior   Severity of Pain 3           Vitals:  Ht 5' 3\" (1.6 m)   Wt 158 lb (71.7 kg)   BMI 27.99 kg/m²    Body mass index is 27.99 kg/m². Physical Exam    Integumentary  Assessment of Surgical Incision - healing and consistent with normal anticipated wound healing. Neurologic  Overall Assessment of Muscle Strength and Tone reveals  Upper Extremities - Right Deltoid - 5/5. Left Deltoid - 5/5. Right Bicep - 5/5. Left Bicep - 5/5. Right Tricep - 5/5. Left Tricep - 5/5. Right Wrist Extensors - 5/5. Left Wrist Extensors - 5/5. Right Wrist Flexors - 5/5. Left Wrist Flexors - 5/5. Right Intrinsics - 5/5. Left Intrinsics - 5/5. General Assessment of Reflexes  Right Hand - Katz's sign is negative in the right hand.  Left Hand - Katz's sign is negative in the left hand.  Reflexes (Dermatomes)  2/2 Normal - Left Bicep (C5-6), Left Tricep (C7-8), Left Brachioradialis (C5-6), Right Bicep (C5-6), Right Tricep (C7-8) and Right Brachioradialis (C5-6). Musculoskeletal  Global Assessment  Examination of related systems reveals - well-developed, well-nourished, in no acute distress, alert and oriented x 3 and normal coordination. Gait and Station - normal gait and station and normal posture. Spine/Ribs/Pelvis  Cervical Spine - Evaluation of related systems reveals - no lymphadenopathy and neurovascularly intact bilaterally. Inspection and Palpation - Tenderness - moderate and localized. Assessment of pain reveals the following findings: - Location - cervical area. Dr. William Iglesias was available for immediate consult during this encounter. An electronic signature was used to authenticate this note.   -- [employer], PA

## 2022-09-28 NOTE — LETTER
9/28/2022    Patient: Josseline Aguilar   YOB: 1955   Date of Visit: 9/28/2022     Jimmy Henry, [address]  [place] [address]  Via Fax: [phone]    Dear Jimmy Henry MD,      Thank you for referring Ms. Bolivar Lakhani to [facility] for evaluation. My notes for this consultation are attached. If you have questions, please do not hesitate to call me. I look forward to following your patient along with you.       Sincerely,    KEYANNA Lundy

## 2022-09-28 NOTE — PROGRESS NOTES
1. Have you been to the ER, urgent care clinic since your last visit? Hospitalized since your last visit? No    2. Have you seen or consulted any other health care providers outside of the [address] [place] [address] since your last visit? Include any pap smears or colon screening.  No    Chief Complaint   Patient presents with    Surgical Follow-up     Sx 9/16/22 C5/6 ACDF (PO#1)

## 2022-10-27 ENCOUNTER — OFFICE VISIT (OUTPATIENT)
Dept: ORTHOPEDIC SURGERY | Age: 67
End: 2022-10-27
Payer: COMMERCIAL

## 2022-10-27 VITALS — HEIGHT: 63 IN | BODY MASS INDEX: 28 KG/M2 | WEIGHT: 158 LBS

## 2022-10-27 DIAGNOSIS — Z98.1 S/P CERVICAL SPINAL FUSION: Primary | ICD-10-CM

## 2022-10-27 PROCEDURE — 99024 POSTOP FOLLOW-UP VISIT: CPT | Performed by: STUDENT IN AN ORGANIZED HEALTH CARE EDUCATION/TRAINING PROGRAM

## 2022-10-27 RX ORDER — METHYLPREDNISOLONE 4 MG/1
TABLET ORAL
Qty: 1 DOSE PACK | Refills: 0 | Status: SHIPPED | OUTPATIENT
Start: 2022-10-27

## 2022-11-01 NOTE — PROGRESS NOTES
Chris Jones (: 1955) is a 79 y.o. female, patient, here for evaluation of the following chief complaint(s):  Surgical Follow-up       ASSESSMENT/PLAN:  Below is the assessment and plan developed based on review of pertinent history, physical exam, labs, studies, and medications. Patient presents today approximately 6 weeks status post recent ACDF. She continues to progress well from her neck surgery. Radiologic findings reviewed with the patient. She has had a flareup of mechanical low back pain over the weekend, that is slowly improving. Denies any upper or lower extremity radiating pain, numbness or tingling. Denies acute changes in bowel or bladder control. No weakness noted on physical exam.  Medrol Dosepak prescribed today. Referral to physical therapy for neck and back provided to the patient. Informed patient to call our office with any worsening of symptoms. Discussed avoiding anti-inflammatories while on the Medrol Dosepak. Patient verbalized understanding. She will follow-up in 6 weeks. 1. S/P cervical spinal fusion  -     XR SPINE CERV PA LAT ODONT 3 V MAX; Future  -     REFERRAL TO PHYSICAL THERAPY; Future      No follow-ups on file. SUBJECTIVE/OBJECTIVE:  Chris Jones (: 1955) is a 79 y.o. female. Pain Assessment  2022   Location of Pain Neck;Back   Location Modifiers Posterior   Severity of Pain 3        Patient presents today approximately 6 weeks status post recent ACDF. She is doing well in terms of her neck surgery. Denies any upper extremity radiating pain, numbness or tingling. Denies any upper extremity weakness. She is not taking any medications at this time. Unfortunately, she does report a flareup of her low back pain over the weekend. Denies any acute changes in bowel or bladder control. Denies any lower extremity radiating pain, numbness tingling or weakness.   Her symptoms are slowly improving, though significantly impacting her daily activities at this time. Imaging:    XR Results (most recent):  Results from Appointment encounter on 10/27/22    XR SPINE CERV PA LAT ODONT 3 V MAX    Narrative  AP and lateral of the cervical spine demonstrates a stable ACDF C5-C6. No acute changes noted. Good graft incorporation. Allergies   Allergen Reactions    Lexapro [Escitalopram] Other (comments)     Elevated BP       Current Outpatient Medications   Medication Sig    methylPREDNISolone (MEDROL DOSEPACK) 4 mg tablet Per dose pack instructions    acetaminophen (TYLENOL) 500 mg tablet Take 500 mg by mouth every six (6) hours as needed for Pain. as prescribed by MD    aspirin delayed-release 81 mg tablet Take 81 mg by mouth daily as needed for Pain. as prescribed by MD    senna-docusate (PERICOLACE) 8.6-50 mg per tablet Take 2 Tablets by mouth daily as needed for Constipation. fish oil-dha-epa 1,200-144-216 mg cap Take 1 Capsule by mouth daily. multivitamin (ONE A DAY) tablet Take 1 Tablet by mouth daily. fexofenadine (ALLEGRA) 180 mg tablet Take 180 mg by mouth daily as needed for Allergies. atorvastatin (LIPITOR) 20 mg tablet Take 20 mg by mouth daily. amLODIPine (NORVASC) 10 mg tablet Take 10 mg by mouth daily. baclofen (LIORESAL) 20 mg tablet TAKE 1 TABLET BY MOUTH THREE TIMES DAILY AS NEEDED FOR PAIN (Patient not taking: Reported on 10/27/2022)     No current facility-administered medications for this visit.        Past Medical History:   Diagnosis Date    Arthritis     High cholesterol     Hypertension     Spine disorder         Past Surgical History:   Procedure Laterality Date    HX GYN      FIBROIDECTOMY    HX HEENT      DENTAL       Family History   Problem Relation Age of Onset    No Known Problems Mother     No Known Problems Father     Diabetes Sister     Heart Attack Brother     Anesth Problems Neg Hx         Social History     Tobacco Use    Smoking status: Never    Smokeless tobacco: Never   Substance Use Topics Alcohol use: Yes     Comment: rare    Drug use: No        Review of Systems   Constitutional:  Negative for chills and fever. Musculoskeletal:  Positive for arthralgias, back pain and myalgias. Neurological:  Negative for weakness and numbness. Vitals:  Ht 5' 3\" (1.6 m)   Wt 158 lb (71.7 kg)   BMI 27.99 kg/m²    Body mass index is 27.99 kg/m². Physical Exam  Integumentary  Assessment of Surgical Incision - healing and consistent with normal anticipated wound healing. Neurologic  Overall Assessment of Muscle Strength and Tone reveals  Upper Extremities - Right Deltoid - 5/5. Left Deltoid - 5/5. Right Bicep - 5/5. Left Bicep - 5/5. Right Tricep - 5/5. Left Tricep - 5/5. Right Wrist Extensors - 5/5. Left Wrist Extensors - 5/5. Right Wrist Flexors - 5/5. Left Wrist Flexors - 5/5. Right Intrinsics - 5/5. Left Intrinsics - 5/5. General Assessment of Reflexes  Right Hand - Katz's sign is negative in the right hand. Left Hand - Katz's sign is negative in the left hand. Reflexes (Dermatomes)  2/2 Normal - Left Bicep (C5-6), Left Tricep (C7-8), Left Brachioradialis (C5-6), Right Bicep (C5-6), Right Tricep (C7-8) and Right Brachioradialis (C5-6). Neurologic  Sensory  Light Touch - Intact - Globally. Overall Assessment of Muscle Strength and Tone reveals  Lower Extremities - Right Iliopsoas - 5/5. Left Iliopsoas - 5/5. Right Tibialis Anterior - 5/5. Left Tibialis Anterior - 5/5. Right Gastroc-Soleus - 5/5. Left Gastroc-Soleus - 5/5. Right EHL - 5/5. Left EHL - 5/5. General Assessment of Reflexes  Right Ankle - Clonus is not present. Left Ankle - Clonus is not present. Reflexes (Dermatomes)  2/2 Normal - Left Achilles (L5-S2), Left Knee (L2-4), Right Achilles (L5-S2) and Right Knee (L2-4). Musculoskeletal  Global Assessment  Examination of related systems reveals - well-developed, well-nourished, in no acute distress, alert and oriented x 3 and normal coordination.  Gait and Station - normal gait and station and normal posture. Spine/Ribs/Pelvis  Cervical Spine - Evaluation of related systems reveals - no lymphadenopathy and neurovascularly intact bilaterally. Inspection and Palpation - Tenderness - moderate and localized. Assessment of pain reveals the following findings: - Location - cervical area. Dr. Linda Murillo was available for immediate consult during this encounter. An electronic signature was used to authenticate this note.   -- [facility], [facility]

## 2022-11-02 ENCOUNTER — OFFICE VISIT (OUTPATIENT)
Dept: ORTHOPEDIC SURGERY | Age: 67
End: 2022-11-02
Payer: MEDICARE

## 2022-11-02 DIAGNOSIS — M54.2 NECK PAIN: Primary | ICD-10-CM

## 2022-11-02 DIAGNOSIS — M54.50 CHRONIC BILATERAL LOW BACK PAIN WITHOUT SCIATICA: ICD-10-CM

## 2022-11-02 DIAGNOSIS — Z98.1 S/P CERVICAL SPINAL FUSION: ICD-10-CM

## 2022-11-02 DIAGNOSIS — M25.69 BACK STIFFNESS: ICD-10-CM

## 2022-11-02 DIAGNOSIS — G89.29 CHRONIC BILATERAL LOW BACK PAIN WITHOUT SCIATICA: ICD-10-CM

## 2022-11-02 PROCEDURE — 97110 THERAPEUTIC EXERCISES: CPT | Performed by: PHYSICAL THERAPIST

## 2022-11-02 NOTE — PROGRESS NOTES
Patient Name: Phylicia Johnson  Date:2022  : 1955  [x]  Patient  Verified  Payor: Margareth Grover / Plan: [address] [place] [address] HMO / Product Type: Managed Care Medicare /    Total Treatment Time (min): 40 min  1:1 Treatment Time ([facility] only): 40 min   Nicki Avila MD  1. Neck pain  2. S/P cervical spinal fusion  3. Back stiffness  4. Chronic bilateral low back pain without sciatica    Subjective:    Patient is a 79 y.o. female referred to physical therapy by Dr. Didi Baer s/p cervical fusion and low back pain. Patient had cervical fusion on 22. Patient states she is doing well regarding her neck but has continued c/o stiffness and pain in BLE. Patient arrives to clinic ambulating w/RW. She states that before her fusion that she had several bad falls and is concerned regarding falling again. Patient states that she sits most of the day. She rates her pain as a 5/10. Past medical history and medication list can otherwise be reviewed per the EHR. Objective:  AROM  Cervical  RSB: 15 degrees  LSB:20 degrees  RR: 60 degrees  LR:65 degrees  TTP over B UT and levator scapulae    Lumbar  FF:fingertips to above knees  Ext: 0 with pain  BSB:50%  Hamstrings limited to 50 degrees bilaterally  +Jean test B  TTP over left glut medius    Ex: 20 min  Treatment today to include instruction in a home exercise program as well as providing patient with written and visual handouts. Man:     NMR:        All questions were addressed. Assessment:    Patient presents with increased pain and decreased ROM, strength, and mobility consistent with being s/p cervical fusion and having LBP. Patient will benefit from skilled PT to improve AROM and decrease pain in Low back. Long Term Goals. 8 weeks  Patient will report centralized LBP to be consisitently less than or equal to 1/10 with all home/work/community/recreational ADL activity.    Patient will report zero radicular pain with home/work/community/recreational ADL activity. Pt. Will return to premorbid activities such as house chores    Short Term Goals. 2 visits. Patient will demonstrate independence with HEP. Pt. Will sleep through the night without waking 2nd pain    Plan:  Plan of care: Physical therapy consisted of frequency of 2/week for the next 8 weeks. Physical therapy will consist of therapeutic exercise, modalities, patient education, neuromuscular reeducation, manual therapy, therapeutic activity, dry needling, and instruction in home exercise program as appropriate. Eval  Ex: 20 min  Man:   NMR:       The referring physician has reviewed and approved this evaluation and plan of care as noted by the electronic signature attached to note.     Nneka Moore, MSPT, DPT

## 2022-11-04 ENCOUNTER — OFFICE VISIT (OUTPATIENT)
Dept: ORTHOPEDIC SURGERY | Age: 67
End: 2022-11-04
Payer: MEDICARE

## 2022-11-04 DIAGNOSIS — M25.69 BACK STIFFNESS: ICD-10-CM

## 2022-11-04 DIAGNOSIS — M54.2 NECK PAIN: Primary | ICD-10-CM

## 2022-11-04 DIAGNOSIS — G89.29 CHRONIC BILATERAL LOW BACK PAIN WITHOUT SCIATICA: ICD-10-CM

## 2022-11-04 DIAGNOSIS — Z98.1 S/P CERVICAL SPINAL FUSION: ICD-10-CM

## 2022-11-04 DIAGNOSIS — M54.50 CHRONIC BILATERAL LOW BACK PAIN WITHOUT SCIATICA: ICD-10-CM

## 2022-11-04 PROCEDURE — 97110 THERAPEUTIC EXERCISES: CPT

## 2022-11-04 PROCEDURE — 97140 MANUAL THERAPY 1/> REGIONS: CPT

## 2022-11-04 NOTE — PROGRESS NOTES
I have reviewed the notes, assessments, and/or procedures performed by Johanna Kim PTA, I concur with her/his documentation of Caty Tesfaye.

## 2022-11-04 NOTE — PROGRESS NOTES
PT DAILY TREATMENT NOTE    Patient Name: Kiara Glynn  Date:2022  : 1955  [x]  Patient  Verified  Payor: Len Chowdary / Plan: [address] [place] [address] HMO / Product Type: Managed Care Medicare /    Total Treatment Time (min): 45  1:1 Treatment Time ( [address] only): 25   Referring Provider: Tracey Marsh MD    1. Neck pain  2. S/P cervical spinal fusion  3. Back stiffness  4. Chronic bilateral low back pain without sciatica    SUBJECTIVE  Subjective functional status/changes:   [] No changes reported    Patient reports her legs are sore and tight from HEP. OBJECTIVE/TREATMENT    Manual Therapy x 10 mins:   Manual assistance with bilateral hamstring, KTC, glute and iliopsoas passive flexibility exercises in supine in modified Jean test position. Neuromuscular Re-education (NMR) x   minutes:  []  Kinesiotaping for   []  Neuromuscular reeducation to the VMO with use of [place] electrical stimulation in conjunction with active contraction and exercises. []  balance/proprioceptive exercises and activities in clinic as listed below as NMR. Therapeutic Exercise x 15 mins:   Strengthening/Endurance/ADL function/Neuromuscular reeducation activities/exercises supervised and completed as listed below.       EXERCISE X= completed on  2022   LTR 10x   DKTC w/ball x   UT stretch x   HS stretch w/strap x                                                                   Added/Changed Exercises:  []  Advanced to address: [] functional strength/ROM deficits [] balance/proprioceptive tasks  []  Modified: [] per subjective reports [] for patient time constraints [] for clinic time constraints    Modality:  []  E-Stim: type _ x _ min     []att   []unatt   []w/ice   []w/heat  []  Ultrasound: []cont   []pulse    _ W/cm2 x _  min   []1MHz   []3MHz  []  Ice pack: post      []  Hot pack: pre    []  Other:     Patient Education: [] Review HEP    [] Progressed/Changed HEP to include:  [] positioning   [] body mechanics   [] transfers   [] heat/ice application      ASSESSMENT    Very tight/sore in anterior thighs but subjectively feels better after today's treatment.      Progress towards goals / Updated goals:    PLAN  [x]  Upgrade activities as tolerated      [x]  Continue plan of care  []  Discharge due to:  []  Other:      Co-treatment by Yuliya Starr, HARMONY 11/4/2022

## 2022-11-09 ENCOUNTER — OFFICE VISIT (OUTPATIENT)
Dept: ORTHOPEDIC SURGERY | Age: 67
End: 2022-11-09
Payer: MEDICARE

## 2022-11-09 DIAGNOSIS — Z98.1 S/P CERVICAL SPINAL FUSION: ICD-10-CM

## 2022-11-09 DIAGNOSIS — M54.2 NECK PAIN: Primary | ICD-10-CM

## 2022-11-09 DIAGNOSIS — M25.69 BACK STIFFNESS: ICD-10-CM

## 2022-11-09 PROCEDURE — 97110 THERAPEUTIC EXERCISES: CPT | Performed by: PHYSICAL THERAPIST

## 2022-11-09 PROCEDURE — 97140 MANUAL THERAPY 1/> REGIONS: CPT | Performed by: PHYSICAL THERAPIST

## 2022-11-09 NOTE — PROGRESS NOTES
PT DAILY TREATMENT NOTE    Patient Name: Aleta Borden  Date:2022  : 1955  [x]  Patient  Verified  Payor: Sneha Haq / Plan: [address] [place] [address] HMO / Product Type: Managed Care Medicare /    Total Treatment Time (min): 45  1:1 Treatment Time ( [address] only): 30 min  Referring Provider: Gerard Trejo MD    1. Neck pain  2. S/P cervical spinal fusion  3. Back stiffness    SUBJECTIVE  Subjective functional status/changes:   [] No changes reported    Still stiff in my legs when I sit. OBJECTIVE/TREATMENT    Manual Therapy x 10 mins:   Manual assistance with bilateral hamstring, KTC, glute and iliopsoas passive flexibility exercises in supine in modified Jean test position. Neuromuscular Re-education (NMR) x   minutes:  []  Kinesiotaping for   []  Neuromuscular reeducation to the VMO with use of [place] electrical stimulation in conjunction with active contraction and exercises. []  balance/proprioceptive exercises and activities in clinic as listed below as NMR. Therapeutic Exercise x 20 mins:   Strengthening/Endurance/ADL function/Neuromuscular reeducation activities/exercises supervised and completed as listed below.       EXERCISE X= completed on  2022   LTR 10x   DKTC w/ball x   UT stretch x   HS stretch w/strap x   Sit to stands 10x                                                               Added/Changed Exercises:  []  Advanced to address: [] functional strength/ROM deficits [] balance/proprioceptive tasks  []  Modified: [] per subjective reports [] for patient time constraints [] for clinic time constraints    Modality:  []  E-Stim: type _ x _ min     []att   []unatt   []w/ice   []w/heat  []  Ultrasound: []cont   []pulse    _ W/cm2 x _  min   []1MHz   []3MHz  []  Ice pack: post      []  Hot pack: pre    []  Other:     Patient Education: [] Review HEP    [] Progressed/Changed HEP to include:  [] positioning   [] body mechanics   [] transfers   [] heat/ice application      ASSESSMENT    Has continued c/o BLE tightness after periods of activity. Tightness is relieved w/walking. Patient continues to ambulate w/rolling walker for fear of falling.      Progress towards goals / Updated goals:    PLAN  [x]  Upgrade activities as tolerated      [x]  Continue plan of care  []  Discharge due to:  []  Other:      Co-treatment by Paula Cotto, PT 11/9/2022

## 2022-11-16 ENCOUNTER — OFFICE VISIT (OUTPATIENT)
Dept: ORTHOPEDIC SURGERY | Age: 67
End: 2022-11-16
Payer: MEDICARE

## 2022-11-16 DIAGNOSIS — M25.69 BACK STIFFNESS: ICD-10-CM

## 2022-11-16 DIAGNOSIS — Z98.1 S/P CERVICAL SPINAL FUSION: Primary | ICD-10-CM

## 2022-11-16 DIAGNOSIS — M54.2 NECK PAIN: ICD-10-CM

## 2022-11-16 PROCEDURE — 97140 MANUAL THERAPY 1/> REGIONS: CPT

## 2022-11-16 PROCEDURE — 97110 THERAPEUTIC EXERCISES: CPT

## 2022-11-16 NOTE — PROGRESS NOTES
I have reviewed the notes, assessments, and/or procedures performed by Matthew James PTA, I concur with her/his documentation of Phylicia Johnson. PT DAILY TREATMENT NOTE    Patient Name: Phylicia Johnson  Date:2022  : 1955  [x]  Patient  Verified  Payor: Margareth Grover / Plan: [address] [place] [address] HMO / Product Type: Managed Care Medicare /    Total Treatment Time (min): 45  1:1 Treatment Time ([facility] only): 39    Referring Provider: Nicki Avila MD    1. S/P cervical spinal fusion  2. Back stiffness  3. Neck pain    SUBJECTIVE  Subjective functional status/changes:   [] No changes reported    Patient reports legs are still stiff. She is only using FWW when out of the house. OBJECTIVE/TREATMENT    Manual Therapy x 10 mins:   Manual assistance with bilateral hamstring, KTC, glute and iliopsoas passive flexibility exercises in supine in modified Jean test position. Neuromuscular Re-education (NMR) x   minutes:  []  Kinesiotaping for   []  Neuromuscular reeducation to the VMO with use of [place] electrical stimulation in conjunction with active contraction and exercises. []  balance/proprioceptive exercises and activities in clinic as listed below as NMR. Therapeutic Exercise x 30 mins:   Strengthening/Endurance/ADL function/Neuromuscular reeducation activities/exercises supervised and completed as listed below. EXERCISE X= completed on  2022   LTR 10x   DKTC w/ball x   UT stretch x   HS stretch w/strap x   Sit to stands 10x   PPT w/hip ER grn   Forward/lateral arm rasies 10x   cups x   NuStep Man.  10'                                               Added/Changed Exercises:  [x]  Advanced to address: [x] functional strength/ROM deficits [] balance/proprioceptive tasks  []  Modified: [] per subjective reports [] for patient time constraints [] for clinic time constraints    Modality:  []  E-Stim: type _ x _ min     []att   []unatt   []w/ice   []w/heat  []  Ultrasound: []cont []pulse    _ W/cm2 x _  min   []1MHz   []3MHz  []  Ice pack: post      []  Hot pack: pre    []  Other:     Patient Education: [] Review HEP    [] Progressed/Changed HEP to include:  [] positioning   [] body mechanics   [] transfers   [] heat/ice application      ASSESSMENT    She is stiff walking without FWW here in clinic. She is able to demonstrate better foot clearance when using FWW. Fatigued with exercise progression today.      Progress towards goals / Updated goals:    PLAN  [x]  Upgrade activities as tolerated      [x]  Continue plan of care  []  Discharge due to:  []  Other:      Co-treatment by Edinson Padron, PTA 11/16/2022

## 2022-11-18 ENCOUNTER — OFFICE VISIT (OUTPATIENT)
Dept: ORTHOPEDIC SURGERY | Age: 67
End: 2022-11-18
Payer: MEDICARE

## 2022-11-18 DIAGNOSIS — M25.69 BACK STIFFNESS: ICD-10-CM

## 2022-11-18 DIAGNOSIS — M54.50 CHRONIC BILATERAL LOW BACK PAIN WITHOUT SCIATICA: ICD-10-CM

## 2022-11-18 DIAGNOSIS — M54.2 NECK PAIN: ICD-10-CM

## 2022-11-18 DIAGNOSIS — G89.29 CHRONIC BILATERAL LOW BACK PAIN WITHOUT SCIATICA: ICD-10-CM

## 2022-11-18 DIAGNOSIS — Z98.1 S/P CERVICAL SPINAL FUSION: Primary | ICD-10-CM

## 2022-11-18 PROCEDURE — 97140 MANUAL THERAPY 1/> REGIONS: CPT

## 2022-11-18 PROCEDURE — 97110 THERAPEUTIC EXERCISES: CPT

## 2022-11-18 NOTE — PROGRESS NOTES
PT DAILY TREATMENT NOTE    Patient Name: Ayesha Rubi  Date:2022  : 1955  [x]  Patient  Verified  Payor: Suma Easton / Plan: [address] [place] [address] HMO / Product Type: Managed Care Medicare /    Total Treatment Time (min): 45  1:1 Treatment Time ( [address] only): 39    Referring Provider: Lydia Desai MD    1. S/P cervical spinal fusion  2. Back stiffness  3. Neck pain  4. Chronic bilateral low back pain without sciatica    SUBJECTIVE  Subjective functional status/changes:   [] No changes reported    Patient reports she was very stiff/sore after her last appointment. OBJECTIVE/TREATMENT    Manual Therapy x 10 mins:   Manual assistance with bilateral hamstring, KTC, glute and iliopsoas passive flexibility exercises in supine in modified Jean test position. Neuromuscular Re-education (NMR) x   minutes:  []  Kinesiotaping for   []  Neuromuscular reeducation to the VMO with use of [place] electrical stimulation in conjunction with active contraction and exercises. []  balance/proprioceptive exercises and activities in clinic as listed below as NMR. Therapeutic Exercise x 30 mins:   Strengthening/Endurance/ADL function/Neuromuscular reeducation activities/exercises supervised and completed as listed below. EXERCISE X= completed on  2022   LTR 10x   DKTC w/ball x   UT stretch x   HS stretch w/strap x   Sit to stands 15x   PPT w/hip ER grn   Forward/lateral arm rasies 15x   cups x   NuStep Man.  10'                                               Added/Changed Exercises:  [x]  Advanced to address: [x] functional strength/ROM deficits [] balance/proprioceptive tasks  []  Modified: [] per subjective reports [] for patient time constraints [] for clinic time constraints    Modality:  []  E-Stim: type _ x _ min     []att   []unatt   []w/ice   []w/heat  []  Ultrasound: []cont   []pulse    _ W/cm2 x _  min   []1MHz   []3MHz  []  Ice pack: post      []  Hot pack: pre    []  Other: Patient Education: [] Review HEP    [] Progressed/Changed HEP to include:  [] positioning   [] body mechanics   [] transfers   [] heat/ice application      ASSESSMENT    She was able to do sit<>stand without UE assist today. Still very stiff and unsteady during gait without A.D.      Progress towards goals / Updated goals:    PLAN  [x]  Upgrade activities as tolerated      [x]  Continue plan of care  []  Discharge due to:  []  Other:      Co-treatment by Matthew James, PTA 11/18/2022

## 2022-11-21 NOTE — PROGRESS NOTES
I have reviewed the notes, assessments, and/or procedures performed by Rossy Raymond PTA, I concur with her/his documentation of Eduard Pacheco.

## 2022-11-21 NOTE — PROGRESS NOTES
I have reviewed the notes, assessments, and/or procedures performed by Edward Torres PTA, I concur with her/his documentation of Billie Palacio.

## 2022-11-22 ENCOUNTER — OFFICE VISIT (OUTPATIENT)
Dept: ORTHOPEDIC SURGERY | Age: 67
End: 2022-11-22
Payer: MEDICARE

## 2022-11-22 DIAGNOSIS — Z98.1 S/P CERVICAL SPINAL FUSION: Primary | ICD-10-CM

## 2022-11-22 DIAGNOSIS — M54.2 NECK PAIN: ICD-10-CM

## 2022-11-22 DIAGNOSIS — M25.69 BACK STIFFNESS: ICD-10-CM

## 2022-11-22 DIAGNOSIS — M54.50 CHRONIC BILATERAL LOW BACK PAIN WITHOUT SCIATICA: ICD-10-CM

## 2022-11-22 DIAGNOSIS — G89.29 CHRONIC BILATERAL LOW BACK PAIN WITHOUT SCIATICA: ICD-10-CM

## 2022-11-22 PROCEDURE — 97110 THERAPEUTIC EXERCISES: CPT

## 2022-11-22 PROCEDURE — 97140 MANUAL THERAPY 1/> REGIONS: CPT

## 2022-11-22 NOTE — PROGRESS NOTES
I have reviewed the notes, assessments, and/or procedures performed by Blane Pryor PTA, I concur with her/his documentation of Emily Short.

## 2022-11-22 NOTE — PROGRESS NOTES
PT DAILY TREATMENT NOTE    Patient Name: Troy Rueda  Date:2022  : 1955  [x]  Patient  Verified  Payor: [employer] [place] / Plan: [address] [place] [address] HMO / Product Type: Managed Care Medicare /    Total Treatment Time (min): 45  1:1 Treatment Time ( [address] only): 39    Referring Provider: Hiram Ornelas MD    1. S/P cervical spinal fusion  2. Back stiffness  3. Neck pain  4. Chronic bilateral low back pain without sciatica    SUBJECTIVE  Subjective functional status/changes:   [] No changes reported    Patient reports she has been doing a lot of standing/cooking and feels stiff. She states she is trying to take bigger steps when she's walking. OBJECTIVE/TREATMENT    Manual Therapy x 10 mins:   Manual assistance with bilateral hamstring, KTC, glute and iliopsoas passive flexibility exercises in supine in modified Jean test position. Neuromuscular Re-education (NMR) x   minutes:  []  Kinesiotaping for   []  Neuromuscular reeducation to the VMO with use of [place] electrical stimulation in conjunction with active contraction and exercises. []  balance/proprioceptive exercises and activities in clinic as listed below as NMR. Therapeutic Exercise x 20 mins:   Strengthening/Endurance/ADL function/Neuromuscular reeducation activities/exercises supervised and completed as listed below. EXERCISE X= completed on  2022   LTR 10x   DKTC w/ball x   UT stretch x   HS stretch w/strap x   Sit to stands 15x   PPT w/hip ER grn   Forward/lateral arm rasies 15x   cups x   NuStep Man.  10'                                               Added/Changed Exercises:  []  Advanced to address: [] functional strength/ROM deficits [] balance/proprioceptive tasks  []  Modified: [] per subjective reports [] for patient time constraints [] for clinic time constraints    Modality:  []  E-Stim: type _ x _ min     []att   []unatt   []w/ice   []w/heat  []  Ultrasound: []cont   []pulse    _ W/cm2 x _  min   []1MHz []3MHz  []  Ice pack: post      []  Hot pack: pre    []  Other:     Patient Education: [] Review HEP    [] Progressed/Changed HEP to include:  [] positioning   [] body mechanics   [] transfers   [] heat/ice application      ASSESSMENT    Still very stiff with transfers and gait, but seems to be tolerating exercises with less fatigue.      Progress towards goals / Updated goals:    PLAN  [x]  Upgrade activities as tolerated      [x]  Continue plan of care  []  Discharge due to:  []  Other:      Co-treatment by Matthew James, PTA 11/22/2022

## 2022-11-28 ENCOUNTER — OFFICE VISIT (OUTPATIENT)
Dept: ORTHOPEDIC SURGERY | Age: 67
End: 2022-11-28
Payer: MEDICARE

## 2022-11-28 DIAGNOSIS — G89.29 CHRONIC BILATERAL LOW BACK PAIN WITHOUT SCIATICA: ICD-10-CM

## 2022-11-28 DIAGNOSIS — M25.69 BACK STIFFNESS: Primary | ICD-10-CM

## 2022-11-28 DIAGNOSIS — Z98.1 S/P CERVICAL SPINAL FUSION: ICD-10-CM

## 2022-11-28 DIAGNOSIS — M54.2 NECK PAIN: ICD-10-CM

## 2022-11-28 DIAGNOSIS — M54.50 CHRONIC BILATERAL LOW BACK PAIN WITHOUT SCIATICA: ICD-10-CM

## 2022-11-28 PROCEDURE — 97140 MANUAL THERAPY 1/> REGIONS: CPT | Performed by: PHYSICAL THERAPIST

## 2022-11-28 PROCEDURE — 97110 THERAPEUTIC EXERCISES: CPT | Performed by: PHYSICAL THERAPIST

## 2022-11-28 NOTE — PROGRESS NOTES
PT DAILY TREATMENT NOTE    Patient Name: Saniya Johnson  Date:2022  : 1955  [x]  Patient  Verified  Payor: Samy Laser / Plan: [address] [place] [address] HMO / Product Type: Managed Care Medicare /    Total Treatment Time (min): 45  1:1 Treatment Time ( [address] only): 39    Referring Provider: Ronel Garnett MD    1. Back stiffness  2. Neck pain  3. Chronic bilateral low back pain without sciatica  4. S/P cervical spinal fusion    SUBJECTIVE  Subjective functional status/changes:   [] No changes reported    Patient states sore from cooking. Fearful of falling w/o walker. OBJECTIVE/TREATMENT  Hamstrings limited to 65 degrees hip flexion  Manual Therapy x 10 mins:   Manual assistance with bilateral hamstring, KTC, glute and iliopsoas passive flexibility exercises in supine in modified Jean test position. Neuromuscular Re-education (NMR) x   minutes:  []  Kinesiotaping for   []  Neuromuscular reeducation to the VMO with use of [place] electrical stimulation in conjunction with active contraction and exercises. []  balance/proprioceptive exercises and activities in clinic as listed below as NMR. Therapeutic Exercise x 35 mins:   Strengthening/Endurance/ADL function/Neuromuscular reeducation activities/exercises supervised and completed as listed below. EXERCISE X= completed on  2022   LTR 10x   DKTC w/ball x   UT stretch x   HS stretch w/strap x   Sit to stands 15x   PPT w/hip ER grn   Forward/lateral arm rasies 15x   cups x   NuStep Man.  10'   Bridging w/t band 20x gr                                           Added/Changed Exercises:  [x]  Advanced to address: [x] functional strength/ROM deficits [] balance/proprioceptive tasks  []  Modified: [] per subjective reports [] for patient time constraints [] for clinic time constraints    Modality:  []  E-Stim: type _ x _ min     []att   []unatt   []w/ice   []w/heat  []  Ultrasound: []cont   []pulse    _ W/cm2 x _  min   []1MHz   []3MHz  [] Ice pack: post      []  Hot pack: pre    []  Other:     Patient Education: [] Review HEP    [] Progressed/Changed HEP to include:  [] positioning   [] body mechanics   [] transfers   [] heat/ice application      ASSESSMENT  Patient continues to ambulate w/decreased step length w/SC. Needs vc to ambulate heel/toe. C/o pain into knee area w/gait. Tolerating exercise.       Progress towards goals / Updated goals:    PLAN  [x]  Upgrade activities as tolerated      [x]  Continue plan of care  []  Discharge due to:  []  Other:      Co-treatment by Nneka Moore, PT 11/28/2022

## 2022-11-29 ENCOUNTER — OFFICE VISIT (OUTPATIENT)
Dept: ORTHOPEDIC SURGERY | Age: 67
End: 2022-11-29
Payer: MEDICARE

## 2022-11-29 VITALS — BODY MASS INDEX: 27.64 KG/M2 | WEIGHT: 156 LBS | HEIGHT: 63 IN

## 2022-11-29 DIAGNOSIS — Z98.1 S/P CERVICAL SPINAL FUSION: Primary | ICD-10-CM

## 2022-11-29 PROCEDURE — 99024 POSTOP FOLLOW-UP VISIT: CPT | Performed by: ORTHOPAEDIC SURGERY

## 2022-11-29 RX ORDER — DICLOFENAC SODIUM 75 MG/1
75 TABLET, DELAYED RELEASE ORAL 2 TIMES DAILY WITH MEALS
Qty: 60 TABLET | Refills: 0 | Status: SHIPPED | OUTPATIENT
Start: 2022-11-29

## 2022-11-29 NOTE — PROGRESS NOTES
Randolph Cool (: 1955) is a 79 y.o. female, patient, here for evaluation of the following chief complaint(s):  Surgical Follow-up       ASSESSMENT/PLAN:  Below is the assessment and plan developed based on review of pertinent history, physical exam, labs, studies, and medications. Patient presents today approximately 2.5 months status post recent ACDF. She has progressed well in terms of her neck surgery. She does have some persistent low back pain, and reports some leg soreness that she has started to work on leg strengthening recently. She thinks she is finally starting to notice some benefits with physical therapy. I would like for her to continue with PT. Diclofenac prescribed to take as needed. She will continue with over-the-counter acetaminophen. No weakness noted on strength testing of upper or lower extremities. Informed patient to call our office with any worsening of her symptoms. She will follow-up in about 3 months. 1. S/P cervical spinal fusion  -     XR SPINE CERV PA LAT ODONT 3 V MAX; Future  -     REFERRAL TO PHYSICAL THERAPY; Future      No follow-ups on file. SUBJECTIVE/OBJECTIVE:  Randolph Cool (: 1955) is a 79 y.o. female. Pain Assessment  2022   Location of Pain Neck   Location Modifiers -   Severity of Pain 6        Patient presents today approximately 2.5 months status post recent cervical fusion. She has been in physical therapy since her last visit. She states she has been working on leg strengthening, and her legs have been sore over the last few days. She denies any lower extremity weakness. Denies any neck pain or upper extremity radicular symptoms. She has been taking Tylenol Extra Strength with some relief of her symptoms. She does complain of some persistent low back pain. Denies acute changes in bowel or bladder control.     Imaging:    XR Results (most recent):  Results from Appointment encounter on 22    XR SPINE CERV PA LAT ODONT 3 V MAX    Narrative  AP and lateral of the cervical spine demonstrates a stable C5-6 ACDF. No acute changes         MRI Results (most recent):  Results from Hospital Encounter encounter on 08/24/22    MRI CERV SPINE WO CONT    Narrative  EXAM: MRI CERV SPINE WO CONT    INDICATION: Neck pain. COMPARISON: Radiographs 8/22/2012 and 4/10/2008. TECHNIQUE: MR imaging of the cervical spine was performed using the following  sequences: sagittal T1, T2, STIR;  axial T2, T1.    CONTRAST:  None. FINDINGS:    There is diminished transaxial dimension of the cord at the C5-6 level. There is  bilateral paramedian cord T2-STIR signal hyperintensity extending from the mid  C5 through mid C6 levels, more prominent on the right. Vertebral body heights are normal. There is straightening of cervical lordosis  with 2 mm retrolisthesis at C5-6. There are moderately prominent type II  discogenic endplate signal changes centrally and on the left at C5-6. Additionally, there are 12 mm hemangioma incidentally noted at C7 vertebral body  on the right posterolaterally and T1 on the left anterolaterally. No paraspinal or intraspinal mass is shown. .    C2-C3: Mildly diminished is height. Mild disc bulging accentuated in the right  and left lateral regions. Small right uncovertebral osteophytes. No substantial  facet arthrosis. No canal or foraminal stenosis. C3-C4: Mild disc space narrowing. Mild diffuse disc bulging, accentuated in the  left lateral region, and small bilateral uncovertebral osteophytes, larger on  the right. No facet arthrosis. Mild canal stenosis with midline AP canal  dimension of 9 mm. No cord compression. Mild-moderate bilateral foraminal  stenosis. C4-C5: Mild-moderate disc space narrowing. Mild diffuse disc bulge with left  paracentral accentuation. Small bilateral uncovertebral osteophytes. No facet  arthropathy. Mild-moderate canal stenosis with mild cord compression.  Moderate  to severe left and moderate right foraminal stenosis. C5-C6: Moderate to severe disc space narrowing. Moderate diffuse disc osteophyte  complex formation and small central disc protrusion. No substantial facet  arthrosis. Severe canal stenosis with AP canal dimension reduced to 5 mm. Moderate to severe cord compression. Moderate to severe bilateral foraminal  stenosis, greater on the left. C6-C7: Normal disc height. No disc herniation or bulging. No facet arthropathy. No canal or foraminal stenosis. C7-T1: Normal disc height. No disc herniation or bulging. No facet arthropathy. No canal or foraminal stenosis. T1-T2, T2-3 and T3-T4: Shown on sagittal images only. Mild disc space narrowing  at T3-4. No disc herniation or substantial disc bulging. No facet arthropathy or  canal-foraminal stenosis demonstrated. Impression  Multilevel degenerative findings detailed by level above. Note the followin. C5-6 severe canal stenosis with moderate to severe cord compression. 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 2. C4-5 mild-moderate canal stenosis with mild cord compression. 3. C3-4 mild canal stenosis. 4. Foraminal stenosis which is moderate to severe on the left at C4-5, and  moderate to severe bilaterally at C5-6. Allergies   Allergen Reactions    Lexapro [Escitalopram] Other (comments)     Elevated BP       Current Outpatient Medications   Medication Sig    diclofenac EC (VOLTAREN) 75 mg EC tablet Take 1 Tablet by mouth two (2) times daily (with meals). acetaminophen (TYLENOL) 500 mg tablet Take 500 mg by mouth every six (6) hours as needed for Pain. as prescribed by MD    aspirin delayed-release 81 mg tablet Take 81 mg by mouth daily as needed for Pain. as prescribed by MD    senna-docusate (PERICOLACE) 8.6-50 mg per tablet Take 2 Tablets by mouth daily as needed for Constipation. fish oil-dha-epa 1,200-144-216 mg cap Take 1 Capsule by mouth daily. multivitamin (ONE A DAY) tablet Take 1 Tablet by mouth daily. fexofenadine (ALLEGRA) 180 mg tablet Take 180 mg by mouth daily as needed for Allergies. baclofen (LIORESAL) 20 mg tablet     atorvastatin (LIPITOR) 20 mg tablet Take 20 mg by mouth daily. amLODIPine (NORVASC) 10 mg tablet Take 10 mg by mouth daily. methylPREDNISolone (MEDROL DOSEPACK) 4 mg tablet Per dose pack instructions (Patient not taking: Reported on 11/29/2022)     No current facility-administered medications for this visit. Past Medical History:   Diagnosis Date    Arthritis     High cholesterol     Hypertension     Spine disorder         Past Surgical History:   Procedure Laterality Date    HX GYN      FIBROIDECTOMY    HX HEENT      DENTAL       Family History   Problem Relation Age of Onset    No Known Problems Mother     No Known Problems Father     Diabetes Sister     Heart Attack Brother     Anesth Problems Neg Hx         Social History     Tobacco Use    Smoking status: Never    Smokeless tobacco: Never   Substance Use Topics    Alcohol use: Yes     Comment: rare    Drug use: No        Review of Systems       Vitals:  Ht 5' 3\" (1.6 m)   Wt 156 lb (70.8 kg)   BMI 27.63 kg/m²    Body mass index is 27.63 kg/m². Physical Exam  Integumentary  Assessment of Surgical Incision - healing and consistent with normal anticipated wound healing. Neurologic  Overall Assessment of Muscle Strength and Tone reveals  Upper Extremities - Right Deltoid - 5/5. Left Deltoid - 5/5. Right Bicep - 5/5. Left Bicep - 5/5. Right Tricep - 5/5. Left Tricep - 5/5. Right Wrist Extensors - 5/5. Left Wrist Extensors - 5/5. Right Wrist Flexors - 5/5. Left Wrist Flexors - 5/5. Right Intrinsics - 5/5. Left Intrinsics - 5/5. General Assessment of Reflexes  Right Hand - Katz's sign is negative in the right hand. Left Hand - Katz's sign is negative in the left hand.   Reflexes (Dermatomes)  2/2 Normal - Left Bicep (C5-6), Left Tricep (C7-8), Left Brachioradialis (C5-6), Right Bicep (C5-6), Right Tricep (C7-8) and Right Brachioradialis (C5-6). Light Touch - Intact - Globally. Overall Assessment of Muscle Strength and Tone reveals  Lower Extremities - Right Iliopsoas - 5/5. Left Iliopsoas - 5/5. Right Tibialis Anterior - 5/5. Left Tibialis Anterior - 5/5. Right Gastroc-Soleus - 5/5. Left Gastroc-Soleus - 5/5. Right EHL - 5/5. Left EHL - 5/5. General Assessment of Reflexes  Right Ankle - Clonus is not present. Left Ankle - Clonus is not present. Reflexes (Dermatomes)  2/2 Normal - Left Achilles (L5-S2), Left Knee (L2-4), Right Achilles (L5-S2) and Right Knee (L2-4). Musculoskeletal  Global Assessment  Examination of related systems reveals - well-developed, well-nourished, in no acute distress, alert and oriented x 3 and normal coordination. Gait and Station - normal gait and station and normal posture. Spine/Ribs/Pelvis  Mild to moderate lumbar spine pain. Cervical Spine - Evaluation of related systems reveals - no lymphadenopathy and neurovascularly intact bilaterally. Inspection and Palpation - Tenderness - moderate and localized. Assessment of pain reveals the following findings: - Location - cervical area. Dr. Agatha Peck was available for immediate consult during this encounter. An electronic signature was used to authenticate this note.   -- KEYANNA Gavin

## 2022-11-30 ENCOUNTER — OFFICE VISIT (OUTPATIENT)
Dept: ORTHOPEDIC SURGERY | Age: 67
End: 2022-11-30
Payer: MEDICARE

## 2022-11-30 DIAGNOSIS — Z98.1 S/P CERVICAL SPINAL FUSION: Primary | ICD-10-CM

## 2022-11-30 DIAGNOSIS — M25.69 BACK STIFFNESS: ICD-10-CM

## 2022-11-30 DIAGNOSIS — M54.2 NECK PAIN: ICD-10-CM

## 2022-11-30 PROCEDURE — 97110 THERAPEUTIC EXERCISES: CPT | Performed by: PHYSICAL THERAPIST

## 2022-11-30 PROCEDURE — 97140 MANUAL THERAPY 1/> REGIONS: CPT | Performed by: PHYSICAL THERAPIST

## 2022-11-30 NOTE — PROGRESS NOTES
PT DAILY TREATMENT NOTE    Patient Name: Caty Tesfaye  Date:2022  : 1955  [x]  Patient  Verified  Payor: Dilcia Malcolmde / Plan: [address] [place] [address] HMO / Product Type: Managed Care Medicare /    Total Treatment Time (min): 45  1:1 Treatment Time ( [address] only): 39    Referring Provider: Marie Plascencia MD    1. S/P cervical spinal fusion  2. Back stiffness  3. Neck pain    SUBJECTIVE  Subjective functional status/changes:   [] No changes reported    Patient states that she had to walk far to get to PT and that she was very nervous. Patient states she was anxious w/walking that distance. OBJECTIVE/TREATMENT  Hamstrings limited to 65 degrees hip flexion  Manual Therapy x 10 mins:   Manual assistance with bilateral hamstring, KTC, glute and iliopsoas passive flexibility exercises in supine in modified Jean test position. Neuromuscular Re-education (NMR) x   minutes:  []  Kinesiotaping for   []  Neuromuscular reeducation to the VMO with use of [place] electrical stimulation in conjunction with active contraction and exercises. []  balance/proprioceptive exercises and activities in clinic as listed below as NMR. Therapeutic Exercise x 35 mins:   Strengthening/Endurance/ADL function/Neuromuscular reeducation activities/exercises supervised and completed as listed below. EXERCISE X= completed on  2022   LTR 10x   DKTC w/ball x   UT stretch x   HS stretch w/strap x   Sit to stands 15x   PPT w/hip ER grn   Forward/lateral arm rasies 15x   cups x   NuStep Man.  10'   Bridging w/t band 20x gr   marching 20x                                       Added/Changed Exercises:  [x]  Advanced to address: [x] functional strength/ROM deficits [] balance/proprioceptive tasks  []  Modified: [] per subjective reports [] for patient time constraints [] for clinic time constraints    Modality:  []  E-Stim: type _ x _ min     []att   []unatt   []w/ice   []w/heat  []  Ultrasound: []cont   []pulse    _ W/cm2 x _  min   []1MHz   []3MHz  [x]  Ice pack: post      []  Hot pack: pre    []  Other:     Patient Education: [] Review HEP    [] Progressed/Changed HEP to include:  [] positioning   [] body mechanics   [] transfers   [] heat/ice application      ASSESSMENT  Patient continues to be apprehensive w/ambulation. Ambulates w/wide AMARILYS and SC and decreased heel/toe gait. MD states that it will take some time for her balance and strength to return. Patient slowly improving. Lack of AROM persists in quadriceps and hamstrings.      Progress towards goals / Updated goals:    PLAN  [x]  Upgrade activities as tolerated      [x]  Continue plan of care  []  Discharge due to:  []  Other:      Co-treatment by Daniel Thakkar, PT 11/30/2022

## 2022-12-07 ENCOUNTER — OFFICE VISIT (OUTPATIENT)
Dept: ORTHOPEDIC SURGERY | Age: 67
End: 2022-12-07
Payer: MEDICARE

## 2022-12-07 DIAGNOSIS — M25.69 BACK STIFFNESS: Primary | ICD-10-CM

## 2022-12-07 DIAGNOSIS — G89.29 CHRONIC BILATERAL LOW BACK PAIN WITHOUT SCIATICA: ICD-10-CM

## 2022-12-07 DIAGNOSIS — M54.2 NECK PAIN: ICD-10-CM

## 2022-12-07 DIAGNOSIS — M54.50 CHRONIC BILATERAL LOW BACK PAIN WITHOUT SCIATICA: ICD-10-CM

## 2022-12-07 PROCEDURE — 97110 THERAPEUTIC EXERCISES: CPT | Performed by: PHYSICAL THERAPIST

## 2022-12-07 PROCEDURE — 97140 MANUAL THERAPY 1/> REGIONS: CPT | Performed by: PHYSICAL THERAPIST

## 2022-12-07 NOTE — PROGRESS NOTES
PT DAILY TREATMENT NOTE    Patient Name: Cindy Yeung  Date:2022  : 1955  [x]  Patient  Verified  Payor: Britni Simmons / Plan: [address] [place] [address] HMO / Product Type: Managed Care Medicare /    Total Treatment Time (min): 50  1:1 Treatment Time ( [address] only): 50    Referring Provider: Claudette Chowdhury MD    1. Back stiffness  2. Neck pain  3. Chronic bilateral low back pain without sciatica    SUBJECTIVE  Subjective functional status/changes:   [] No changes reported  Patient states that she is fatigued from walking from car and from cleaning yesterday. OBJECTIVE/TREATMENT  Hamstrings limited to 65 degrees hip flexion  TTP over B quadriceps  Manual Therapy x 15 mins:   Manual assistance with bilateral hamstring, KTC, glute and iliopsoas passive flexibility exercises in supine in modified Jean test position. Neuromuscular Re-education (NMR) x   minutes:  []  Kinesiotaping for   []  Neuromuscular reeducation to the VMO with use of [place] electrical stimulation in conjunction with active contraction and exercises. []  balance/proprioceptive exercises and activities in clinic as listed below as NMR. Therapeutic Exercise x 35 mins:   Strengthening/Endurance/ADL function/Neuromuscular reeducation activities/exercises supervised and completed as listed below. EXERCISE X= completed on  2022   LTR 10x   DKTC w/ball x   UT stretch x   HS stretch w/strap x   Sit to stands 15x   PPT w/hip ER grn   Forward/lateral arm rasies 15x   cups x   NuStep Man.  10'   Bridging w/t band 20x gr   marching 20x                                       Added/Changed Exercises:  [x]  Advanced to address: [x] functional strength/ROM deficits [] balance/proprioceptive tasks  []  Modified: [] per subjective reports [] for patient time constraints [] for clinic time constraints    Modality:  []  E-Stim: type _ x _ min     []att   []unatt   []w/ice   []w/heat  []  Ultrasound: []cont   []pulse    _ W/cm2 x _  min []1MHz   []3MHz  [x]  Ice pack: post      []  Hot pack: pre    []  Other:     Patient Education: [] Review HEP    [] Progressed/Changed HEP to include:  [] positioning   [] body mechanics   [] transfers   [] heat/ice application      ASSESSMENT  Patient encourage to increase activity level at home. Patient continues to be stiff in quadriceps and hamstrings. Patient ambulating with flat footed gait. Slowly progressing.    Progress towards goals / Updated goals:    PLAN  [x]  Upgrade activities as tolerated      [x]  Continue plan of care  []  Discharge due to:  []  Other:      Co-treatment by Luis Thompson, PT 12/7/2022

## 2022-12-09 ENCOUNTER — OFFICE VISIT (OUTPATIENT)
Dept: ORTHOPEDIC SURGERY | Age: 67
End: 2022-12-09
Payer: MEDICARE

## 2022-12-09 DIAGNOSIS — M25.69 BACK STIFFNESS: Primary | ICD-10-CM

## 2022-12-09 DIAGNOSIS — M54.2 NECK PAIN: ICD-10-CM

## 2022-12-09 DIAGNOSIS — G89.29 CHRONIC BILATERAL LOW BACK PAIN WITHOUT SCIATICA: ICD-10-CM

## 2022-12-09 DIAGNOSIS — M54.50 CHRONIC BILATERAL LOW BACK PAIN WITHOUT SCIATICA: ICD-10-CM

## 2022-12-09 NOTE — PROGRESS NOTES
PT DAILY TREATMENT NOTE    Patient Name: Caty Tesfaye  Date:2022  : 1955  [x]  Patient  Verified  Payor: Dilcia Maritza / Plan: [address] [place] [address] HMO / Product Type: Managed Care Medicare /    Total Treatment Time (min): 50  1:1 Treatment Time ([facility] only): 30    Referring Provider: Marie Plascencia MD    1. Back stiffness  2. Neck pain  3. Chronic bilateral low back pain without sciatica    SUBJECTIVE  Subjective functional status/changes:   [] No changes reported  Patient states she is moving better but continues to feel stiff. OBJECTIVE/TREATMENT    TTP over B quadriceps  Manual Therapy x 15 mins:   Manual assistance with bilateral hamstring, KTC, glute and iliopsoas passive flexibility exercises in supine in modified Jean test position. Neuromuscular Re-education (NMR) x   minutes:  []  Kinesiotaping for   []  Neuromuscular reeducation to the VMO with use of [place] electrical stimulation in conjunction with active contraction and exercises. []  balance/proprioceptive exercises and activities in clinic as listed below as NMR. Therapeutic Exercise x 15 mins:   Strengthening/Endurance/ADL function/Neuromuscular reeducation activities/exercises supervised and completed as listed below. EXERCISE X= completed on  2022   LTR 10x   DKTC w/ball x   UT stretch x   HS stretch w/strap x   Sit to stands 15x   PPT w/hip ER grn   Forward/lateral arm rasies 15x   cups x   NuStep Man.  10'   Bridging w/t band 20x gr   marching 20x   sidestepping 2 laps                                   Added/Changed Exercises:  [x]  Advanced to address: [x] functional strength/ROM deficits [] balance/proprioceptive tasks  []  Modified: [] per subjective reports [] for patient time constraints [] for clinic time constraints    Modality:  []  E-Stim: type _ x _ min     []att   []unatt   []w/ice   []w/heat  []  Ultrasound: []cont   []pulse    _ W/cm2 x _  min   []1MHz   []3MHz  [x]  Ice pack: post      [] Hot pack: pre    []  Other:     Patient Education: [] Review HEP    [] Progressed/Changed HEP to include:  [] positioning   [] body mechanics   [] transfers   [] heat/ice application      ASSESSMENT  Patient has increased hamstring and quad flexibility. Continues to be apprehensive w/walking. Slowly improving per POC.     Progress towards goals / Updated goals:    PLAN  [x]  Upgrade activities as tolerated      [x]  Continue plan of care  []  Discharge due to:  []  Other:      Co-treatment by Adonis Moore, PT 12/9/2022

## 2022-12-12 ENCOUNTER — OFFICE VISIT (OUTPATIENT)
Dept: ORTHOPEDIC SURGERY | Age: 67
End: 2022-12-12
Payer: MEDICARE

## 2022-12-12 DIAGNOSIS — M25.69 BACK STIFFNESS: Primary | ICD-10-CM

## 2022-12-12 DIAGNOSIS — M54.50 CHRONIC BILATERAL LOW BACK PAIN WITHOUT SCIATICA: ICD-10-CM

## 2022-12-12 DIAGNOSIS — M54.2 NECK PAIN: ICD-10-CM

## 2022-12-12 DIAGNOSIS — G89.29 CHRONIC BILATERAL LOW BACK PAIN WITHOUT SCIATICA: ICD-10-CM

## 2022-12-12 PROCEDURE — 97140 MANUAL THERAPY 1/> REGIONS: CPT | Performed by: PHYSICAL THERAPIST

## 2022-12-12 PROCEDURE — 97110 THERAPEUTIC EXERCISES: CPT | Performed by: PHYSICAL THERAPIST

## 2022-12-12 NOTE — PROGRESS NOTES
PT DAILY TREATMENT NOTE    Patient Name: Troy Rueda  Date:2022  : 1955  [x]  Patient  Verified  Payor: Sensorly Chesterfield / Plan: [address] [place] [address] HMO / Product Type: Managed Care Medicare /    Total Treatment Time (min): 50  1:1 Treatment Time ( [address] only): 30    Referring Provider: Hiram Ornelas MD    1. Back stiffness  2. Neck pain  3. Chronic bilateral low back pain without sciatica    SUBJECTIVE  Subjective functional status/changes:   [] No changes reported  Patient reports still feeling stiff and sore. OBJECTIVE/TREATMENT  NDI:  TTP over B quadriceps  Manual Therapy x 15 mins:   Manual assistance with bilateral hamstring, KTC, glute and iliopsoas passive flexibility exercises in supine in modified Jean test position. Neuromuscular Re-education (NMR) x   minutes:  []  Kinesiotaping for   []  Neuromuscular reeducation to the VMO with use of [place] electrical stimulation in conjunction with active contraction and exercises. []  balance/proprioceptive exercises and activities in clinic as listed below as NMR. Therapeutic Exercise x 15 mins:   Strengthening/Endurance/ADL function/Neuromuscular reeducation activities/exercises supervised and completed as listed below. EXERCISE X= completed on  2022   LTR 10x   DKTC w/ball x   UT stretch x   HS stretch w/strap x   Sit to stands 15x   PPT w/hip ER grn   Forward/lateral arm rasies 15x   cups x   NuStep/bike Man.  10'   Bridging w/t band 20x gr   marching 20x   sidestepping 2 laps                                   Added/Changed Exercises:  [x]  Advanced to address: [x] functional strength/ROM deficits [] balance/proprioceptive tasks  []  Modified: [] per subjective reports [] for patient time constraints [] for clinic time constraints    Modality:  []  E-Stim: type _ x _ min     []att   []unatt   []w/ice   []w/heat  []  Ultrasound: []cont   []pulse    _ W/cm2 x _  min   []1MHz   []3MHz  [x]  Ice pack: post      []  Hot pack: pre    []  Other:     Patient Education: [] Review HEP    [] Progressed/Changed HEP to include:  [] positioning   [] body mechanics   [] transfers   [] heat/ice application      ASSESSMENT  Patient ambulating w/wide AMARILYS and flat footed gait. Needs vc to ambulate heel/toe. Slowly progressing. Has increased BLE flexibility.    Progress towards goals / Updated goals:    PLAN  [x]  Upgrade activities as tolerated      [x]  Continue plan of care  []  Discharge due to:  []  Other:      Co-treatment by Ismael Francis, PT 12/12/2022

## 2022-12-19 ENCOUNTER — OFFICE VISIT (OUTPATIENT)
Dept: ORTHOPEDIC SURGERY | Age: 67
End: 2022-12-19
Payer: MEDICARE

## 2022-12-19 DIAGNOSIS — G89.29 CHRONIC BILATERAL LOW BACK PAIN WITHOUT SCIATICA: ICD-10-CM

## 2022-12-19 DIAGNOSIS — M54.2 NECK PAIN: ICD-10-CM

## 2022-12-19 DIAGNOSIS — M54.50 CHRONIC BILATERAL LOW BACK PAIN WITHOUT SCIATICA: ICD-10-CM

## 2022-12-19 DIAGNOSIS — M25.69 BACK STIFFNESS: Primary | ICD-10-CM

## 2022-12-19 PROCEDURE — 97140 MANUAL THERAPY 1/> REGIONS: CPT | Performed by: PHYSICAL THERAPIST

## 2022-12-19 PROCEDURE — 97110 THERAPEUTIC EXERCISES: CPT | Performed by: PHYSICAL THERAPIST

## 2022-12-19 NOTE — PROGRESS NOTES
PT DAILY TREATMENT NOTE    Patient Name: Chuy Webber  Date:2022  : 1955  [x]  Patient  Verified  Payor: Nuria Chapman / Plan: [address] [place] [address] HMO / Product Type: Managed Care Medicare /    Total Treatment Time (min): 50  1:1 Treatment Time ( [address] only): 30    Referring Provider: Radha Abad MD    1. Back stiffness  2. Neck pain  3. Chronic bilateral low back pain without sciatica    SUBJECTIVE  Subjective functional status/changes:   [] No changes reported  Patient states doing better but still apprehensive w/walking. OBJECTIVE/TREATMENT  NDI:  TTP over B quadriceps  Ambulating w/wide AMARILYS  Manual Therapy x 15 mins:   Manual assistance with bilateral hamstring, KTC, glute and iliopsoas passive flexibility exercises in supine in modified Jean test position. Neuromuscular Re-education (NMR) x   minutes:  []  Kinesiotaping for   []  Neuromuscular reeducation to the VMO with use of [place] electrical stimulation in conjunction with active contraction and exercises. []  balance/proprioceptive exercises and activities in clinic as listed below as NMR. Therapeutic Exercise x 15 mins:   Strengthening/Endurance/ADL function/Neuromuscular reeducation activities/exercises supervised and completed as listed below. EXERCISE X= completed on  2022   LTR 10x   DKTC w/ball x   UT stretch x   HS stretch w/strap x   Sit to stands 15x   PPT w/hip ER grn   Forward/lateral arm rasies 15x   cups x   NuStep/bike Man.  10'   Bridging w/t band 20x gr   marching 20x   sidestepping 2 laps                                   Added/Changed Exercises:  [x]  Advanced to address: [x] functional strength/ROM deficits [] balance/proprioceptive tasks  []  Modified: [] per subjective reports [] for patient time constraints [] for clinic time constraints    Modality:  []  E-Stim: type _ x _ min     []att   []unatt   []w/ice   []w/heat  []  Ultrasound: []cont   []pulse    _ W/cm2 x _  min   []1MHz []3MHz  [x]  Ice pack: post      []  Hot pack: pre    []  Other:     Patient Education: [] Review HEP    [] Progressed/Changed HEP to include:  [] positioning   [] body mechanics   [] transfers   [] heat/ice application      ASSESSMENT  Patient has continued fear of falling and apprehension w/gait. Tolerating exercise. Continued stiffness in BLE w/hamstring and quad flexibility. Needs vc for ambulation.    Progress towards goals / Updated goals:    PLAN  [x]  Upgrade activities as tolerated      [x]  Continue plan of care  []  Discharge due to:  []  Other:      Co-treatment by Vanna Tamayo, PT 12/19/2022

## 2023-01-03 ENCOUNTER — OFFICE VISIT (OUTPATIENT)
Dept: ORTHOPEDIC SURGERY | Age: 68
End: 2023-01-03
Payer: MEDICARE

## 2023-01-03 DIAGNOSIS — M25.69 BACK STIFFNESS: Primary | ICD-10-CM

## 2023-01-03 DIAGNOSIS — G89.29 CHRONIC BILATERAL LOW BACK PAIN WITHOUT SCIATICA: ICD-10-CM

## 2023-01-03 DIAGNOSIS — Z98.1 S/P CERVICAL SPINAL FUSION: ICD-10-CM

## 2023-01-03 DIAGNOSIS — M54.2 NECK PAIN: ICD-10-CM

## 2023-01-03 DIAGNOSIS — M54.50 CHRONIC BILATERAL LOW BACK PAIN WITHOUT SCIATICA: ICD-10-CM

## 2023-01-03 PROCEDURE — 97140 MANUAL THERAPY 1/> REGIONS: CPT

## 2023-01-03 PROCEDURE — 97110 THERAPEUTIC EXERCISES: CPT

## 2023-01-03 NOTE — PROGRESS NOTES
PT DAILY TREATMENT NOTE    Patient Name: Berna Forte  Date:1/3/2023  : 1955  [x]  Patient  Verified  Payor: Nettie Jean / Plan: [address] [place] [address] HMO / Product Type: Managed Care Medicare /    Total Treatment Time (min): 50  1:1 Treatment Time ( [address] only): 30    Referring Provider: Noé Kimball MD    1. Back stiffness  2. Neck pain  3. S/P cervical spinal fusion  4. Chronic bilateral low back pain without sciatica    SUBJECTIVE  Subjective functional status/changes:   [] No changes reported    Patient reports she has noticed a big improvement in balance since her surgery, but still feels very stiff/weak in her legs. She locates discomfort in the back of the knees. OBJECTIVE/TREATMENT  TTP over B quadriceps  Ambulating w/wide AMARILYS    Manual Therapy x 15 mins:   Manual assistance with bilateral hamstring, KTC, glute and iliopsoas passive flexibility exercises in supine in modified Jean test position. Neuromuscular Re-education (NMR) x   minutes:  []  Kinesiotaping for   []  Neuromuscular reeducation to the VMO with use of [place] electrical stimulation in conjunction with active contraction and exercises. []  balance/proprioceptive exercises and activities in clinic as listed below as NMR. Therapeutic Exercise x 15 mins:   Strengthening/Endurance/ADL function/Neuromuscular reeducation activities/exercises supervised and completed as listed below. EXERCISE X= completed on  1/3/2023   LTR 10x   DKTC w/ball x   UT stretch x   HS stretch w/strap x   Sit to stands 15x   PPT w/hip ER grn   Forward/lateral arm rasies 15x   cups x   NuStep/bike Man.  10'   Bridging w/t band 20x gr   marching 20x   sidestepping 2 laps                                   Added/Changed Exercises:  [x]  Advanced to address: [x] functional strength/ROM deficits [] balance/proprioceptive tasks  []  Modified: [] per subjective reports [] for patient time constraints [] for clinic time constraints    Modality:  []  E-Stim: type _ x _ min     []att   []unatt   []w/ice   []w/heat  []  Ultrasound: []cont   []pulse    _ W/cm2 x _  min   []1MHz   []3MHz  [x]  Ice pack: post      []  Hot pack: pre    []  Other:     Patient Education: [] Review HEP    [] Progressed/Changed HEP to include:  [] positioning   [] body mechanics   [] transfers   [x] heat/ice application: heat to posterior knees      ASSESSMENT    She does seem to be walking with longer strides and better heel strike. Has poor carry-over between visits with LE mobility. Very tight with hamstring and knee to chest flexibility. Less dependent on A.D. for gait in the clinic.      Progress towards goals / Updated goals:  PLAN  [x]  Upgrade activities as tolerated      [x]  Continue plan of care  []  Discharge due to:  []  Other:      Co-treatment by Michael Araujo, PTA 1/3/2023

## 2023-01-04 NOTE — PROGRESS NOTES
I have reviewed the notes, assessments, and/or procedures performed by Maryjane Caraballo PTA, I concur with her/his documentation of Kelly Gill.

## 2023-01-06 ENCOUNTER — OFFICE VISIT (OUTPATIENT)
Dept: ORTHOPEDIC SURGERY | Age: 68
End: 2023-01-06
Payer: MEDICARE

## 2023-01-06 DIAGNOSIS — M54.2 NECK PAIN: Primary | ICD-10-CM

## 2023-01-06 DIAGNOSIS — M48.02 CERVICAL STENOSIS OF SPINAL CANAL: ICD-10-CM

## 2023-01-06 DIAGNOSIS — M54.50 CHRONIC BILATERAL LOW BACK PAIN WITHOUT SCIATICA: ICD-10-CM

## 2023-01-06 DIAGNOSIS — M25.69 BACK STIFFNESS: ICD-10-CM

## 2023-01-06 DIAGNOSIS — G89.29 CHRONIC BILATERAL LOW BACK PAIN WITHOUT SCIATICA: ICD-10-CM

## 2023-01-06 NOTE — PROGRESS NOTES
PT DAILY TREATMENT NOTE    Patient Name: Srinivas Frias  Date:2023  : 1955  [x]  Patient  Verified  Payor: Lizy Giron / Plan: [address] [place] [address] HMO / Product Type: Managed Care Medicare /    Total Treatment Time (min): 50  1:1 Treatment Time ( [address] only): 30    Referring Provider: Lorraine Maharaj MD    1. Neck pain  2. Back stiffness  3. Chronic bilateral low back pain without sciatica  4. Cervical stenosis of spinal canal    SUBJECTIVE  Subjective functional status/changes:   [] No changes reported  Patient states has been really sore since new exercise. Still scared to walk. OBJECTIVE/TREATMENT  TTP over B quadriceps  Ambulating w/wide AMARILYS    Manual Therapy x 15 mins:   Manual assistance with bilateral hamstring, KTC, glute and iliopsoas passive flexibility exercises in supine in modified Jean test position. Neuromuscular Re-education (NMR) x   minutes:  []  Kinesiotaping for   []  Neuromuscular reeducation to the VMO with use of [place] electrical stimulation in conjunction with active contraction and exercises. []  balance/proprioceptive exercises and activities in clinic as listed below as NMR. Therapeutic Exercise x 15 mins:   Strengthening/Endurance/ADL function/Neuromuscular reeducation activities/exercises supervised and completed as listed below. EXERCISE X= completed on  2023   LTR 10x   DKTC w/ball x   UT stretch x   HS stretch w/strap x   Sit to stands 15x   PPT w/hip ER grn   Forward/lateral arm rasies 15x   cups x   NuStep/bike Man.  10'   Bridging w/t band 20x gr   marching 20x   sidestepping 2 laps                                   Added/Changed Exercises:  [x]  Advanced to address: [x] functional strength/ROM deficits [] balance/proprioceptive tasks  []  Modified: [] per subjective reports [] for patient time constraints [] for clinic time constraints    Modality:  []  E-Stim: type _ x _ min     []att   []unatt   []w/ice   []w/heat  []  Ultrasound: []cont []pulse    _ W/cm2 x _  min   []1MHz   []3MHz  [x]  Ice pack: post      []  Hot pack: pre    []  Other:     Patient Education: [] Review HEP    [] Progressed/Changed HEP to include:  [] positioning   [] body mechanics   [] transfers   [x] heat/ice application: heat to posterior knees      ASSESSMENT  Patient continues to be apprehensive w/not using AD for gait. Has continued stiffness in BLE. Encouraged patient to continue to do HEP.       Progress towards goals / Updated goals:  PLAN  [x]  Upgrade activities as tolerated      [x]  Continue plan of care  []  Discharge due to:  []  Other:      Co-treatment by Yariel Gamble, PT 1/6/2023

## 2023-01-10 ENCOUNTER — OFFICE VISIT (OUTPATIENT)
Dept: ORTHOPEDIC SURGERY | Age: 68
End: 2023-01-10
Payer: MEDICARE

## 2023-01-10 DIAGNOSIS — M54.50 CHRONIC BILATERAL LOW BACK PAIN WITHOUT SCIATICA: ICD-10-CM

## 2023-01-10 DIAGNOSIS — M54.2 NECK PAIN: Primary | ICD-10-CM

## 2023-01-10 DIAGNOSIS — M25.69 BACK STIFFNESS: ICD-10-CM

## 2023-01-10 DIAGNOSIS — M48.02 CERVICAL STENOSIS OF SPINAL CANAL: ICD-10-CM

## 2023-01-10 DIAGNOSIS — G89.29 CHRONIC BILATERAL LOW BACK PAIN WITHOUT SCIATICA: ICD-10-CM

## 2023-01-10 PROCEDURE — 97140 MANUAL THERAPY 1/> REGIONS: CPT

## 2023-01-10 PROCEDURE — 97110 THERAPEUTIC EXERCISES: CPT

## 2023-01-10 NOTE — PROGRESS NOTES
PT DAILY TREATMENT NOTE    Patient Name: Hortencia Falcon  Date:1/10/2023  : 1955  [x]  Patient  Verified  Payor: Amanuel Younger / Plan: [address] [place] [address] HMO / Product Type: Managed Care Medicare /    Total Treatment Time (min): 50  1:1 Treatment Time ( [address] only): 30    Referring Provider: Saba Farooq MD    1. Neck pain  2. Back stiffness  3. Chronic bilateral low back pain without sciatica  4. Cervical stenosis of spinal canal    SUBJECTIVE  Subjective functional status/changes:   [] No changes reported    Patient reports her balance has improved with therapy, but she still requires [facility] or [facility] when walking community distances outside of her home. She states she is doing all of her exercises at home, as well as getting assistance from her  to do LE stretching. Still with primary complaints of LE stiffness. OBJECTIVE/TREATMENT  TTP over B quadriceps  Ambulating w/wide AMARILYS    Manual Therapy x 15 mins:   Manual assistance with bilateral hamstring, KTC, glute passive flexibility exercises in supine in modified Jean test position. Neuromuscular Re-education (NMR) x   minutes:  []  Kinesiotaping for   []  Neuromuscular reeducation to the VMO with use of [place] electrical stimulation in conjunction with active contraction and exercises. []  balance/proprioceptive exercises and activities in clinic as listed below as NMR. Therapeutic Exercise x 15 mins:   Strengthening/Endurance/ADL function/Neuromuscular reeducation activities/exercises supervised and completed as listed below. EXERCISE X= completed on  1/10/2023   LTR 10x   DKTC w/ball x   UT stretch x   HS stretch w/strap x   Sit to stands 15x   PPT w/hip ER grn   Forward/lateral arm rasies 15x   cups x   NuStep/bike Man.  10'   Bridging w/t band 20x gr   marching 20x   sidestepping 2 laps                                   Added/Changed Exercises:  []  Advanced to address: [] functional strength/ROM deficits [] balance/proprioceptive tasks  []  Modified: [] per subjective reports [] for patient time constraints [] for clinic time constraints    Modality:  []  E-Stim: type _ x _ min     []att   []unatt   []w/ice   []w/heat  []  Ultrasound: []cont   []pulse    _ W/cm2 x _  min   []1MHz   []3MHz  [x]  Ice pack: post      []  Hot pack: pre    []  Other:     Patient Education: [] Review HEP    [] Progressed/Changed HEP to include:  [] positioning   [] body mechanics   [] transfers   [] heat/ice application:        ASSESSMENT    She is I with exercises in clinic as well as HEP. Still with consistent c/o B LE stiffness. No functional UE limitations. Still reliant on A.D. in the community. We will see her one more time before MD follow-up and defer to MD recommendations at that time.      Progress towards goals / Updated goals:  PLAN  [x]  Upgrade activities as tolerated      [x]  Continue plan of care  []  Discharge due to:  []  Other:      Co-treatment by Edinson Padron PTA 1/10/2023

## 2023-01-12 ENCOUNTER — OFFICE VISIT (OUTPATIENT)
Dept: ORTHOPEDIC SURGERY | Age: 68
End: 2023-01-12
Payer: MEDICARE

## 2023-01-12 DIAGNOSIS — M25.69 BACK STIFFNESS: ICD-10-CM

## 2023-01-12 DIAGNOSIS — G89.29 CHRONIC BILATERAL LOW BACK PAIN WITHOUT SCIATICA: ICD-10-CM

## 2023-01-12 DIAGNOSIS — M54.50 CHRONIC BILATERAL LOW BACK PAIN WITHOUT SCIATICA: ICD-10-CM

## 2023-01-12 DIAGNOSIS — M54.2 NECK PAIN: Primary | ICD-10-CM

## 2023-01-12 NOTE — PROGRESS NOTES
PT DAILY TREATMENT NOTE    Patient Name: Aroldo Candelaria  Date:2023  : 1955  [x]  Patient  Verified  Payor: Filiberto Orozcoce / Plan: [address] [place] [address] HMO / Product Type: Managed Care Medicare /    Total Treatment Time (min): 50  1:1 Treatment Time ( [address] only): 30    Referring Provider: Mikael Kapoor MD    1. Neck pain  2. Back stiffness  3. Chronic bilateral low back pain without sciatica    SUBJECTIVE  Subjective functional status/changes:   [] No changes reported    Patient reports she feels stronger and is walking better. Patient continues to ambulate w/SC with wide AMARILYS. OBJECTIVE/TREATMENT  TTP over B quadriceps  Ambulating w/wide AMARILYS    Manual Therapy x 15 mins:   Manual assistance with bilateral hamstring, KTC, glute passive flexibility exercises in supine in modified Jean test position. Neuromuscular Re-education (NMR) x   minutes:  []  Kinesiotaping for   []  Neuromuscular reeducation to the VMO with use of [place] electrical stimulation in conjunction with active contraction and exercises. []  balance/proprioceptive exercises and activities in clinic as listed below as NMR. Therapeutic Exercise x 15 mins:   Strengthening/Endurance/ADL function/Neuromuscular reeducation activities/exercises supervised and completed as listed below. EXERCISE X= completed on  2023   LTR 10x   DKTC w/ball x   UT stretch x   HS stretch w/strap x   Sit to stands 15x   PPT w/hip ER grn   Forward/lateral arm rasies 15x   cups x   NuStep/bike Man.  10'   Bridging w/t band 20x gr   marching 20x   sidestepping 2 laps                                   Added/Changed Exercises:  []  Advanced to address: [] functional strength/ROM deficits [] balance/proprioceptive tasks  []  Modified: [] per subjective reports [] for patient time constraints [] for clinic time constraints    Modality:  []  E-Stim: type _ x _ min     []att   []unatt   []w/ice   []w/heat  []  Ultrasound: []cont   []pulse    _ W/cm2 x _  min   []1MHz   []3MHz  [x]  Ice pack: post      []  Hot pack: pre    []  Other:     Patient Education: [] Review HEP    [] Progressed/Changed HEP to include:  [] positioning   [] body mechanics   [] transfers   [] heat/ice application:        ASSESSMENT  Patient ambulating w/increased confidence w/o AD in protected area. Continues to ambulate w/SC in most scenarios. Has increased BLE strength but has continued stiffness. C/o limited hamstring AROM w/B knee extension in long sitting.       Progress towards goals / Updated goals:  PLAN  [x]  Upgrade activities as tolerated      [x]  Continue plan of care  []  Discharge due to:  []  Other:      Co-treatment by Sergio Bardales, PT 1/12/2023

## 2023-01-24 ENCOUNTER — OFFICE VISIT (OUTPATIENT)
Dept: ORTHOPEDIC SURGERY | Age: 68
End: 2023-01-24
Payer: MEDICARE

## 2023-01-24 VITALS — HEIGHT: 63 IN | BODY MASS INDEX: 27.64 KG/M2 | WEIGHT: 156 LBS

## 2023-01-24 DIAGNOSIS — Z98.1 S/P CERVICAL SPINAL FUSION: Primary | ICD-10-CM

## 2023-01-24 PROCEDURE — G8427 DOCREV CUR MEDS BY ELIG CLIN: HCPCS | Performed by: STUDENT IN AN ORGANIZED HEALTH CARE EDUCATION/TRAINING PROGRAM

## 2023-01-24 PROCEDURE — G8536 NO DOC ELDER MAL SCRN: HCPCS | Performed by: STUDENT IN AN ORGANIZED HEALTH CARE EDUCATION/TRAINING PROGRAM

## 2023-01-24 PROCEDURE — G8432 DEP SCR NOT DOC, RNG: HCPCS | Performed by: STUDENT IN AN ORGANIZED HEALTH CARE EDUCATION/TRAINING PROGRAM

## 2023-01-24 PROCEDURE — G8417 CALC BMI ABV UP PARAM F/U: HCPCS | Performed by: STUDENT IN AN ORGANIZED HEALTH CARE EDUCATION/TRAINING PROGRAM

## 2023-01-24 PROCEDURE — G8400 PT W/DXA NO RESULTS DOC: HCPCS | Performed by: STUDENT IN AN ORGANIZED HEALTH CARE EDUCATION/TRAINING PROGRAM

## 2023-01-24 PROCEDURE — 1090F PRES/ABSN URINE INCON ASSESS: CPT | Performed by: STUDENT IN AN ORGANIZED HEALTH CARE EDUCATION/TRAINING PROGRAM

## 2023-01-24 PROCEDURE — 1101F PT FALLS ASSESS-DOCD LE1/YR: CPT | Performed by: STUDENT IN AN ORGANIZED HEALTH CARE EDUCATION/TRAINING PROGRAM

## 2023-01-24 PROCEDURE — 1123F ACP DISCUSS/DSCN MKR DOCD: CPT | Performed by: STUDENT IN AN ORGANIZED HEALTH CARE EDUCATION/TRAINING PROGRAM

## 2023-01-24 PROCEDURE — 99213 OFFICE O/P EST LOW 20 MIN: CPT | Performed by: STUDENT IN AN ORGANIZED HEALTH CARE EDUCATION/TRAINING PROGRAM

## 2023-01-24 PROCEDURE — 3017F COLORECTAL CA SCREEN DOC REV: CPT | Performed by: STUDENT IN AN ORGANIZED HEALTH CARE EDUCATION/TRAINING PROGRAM

## 2023-01-24 NOTE — PROGRESS NOTES
Bulmaro Samuels (: 1955) is a 76 y.o. female, patient, here for evaluation of the following chief complaint(s):  LOW BACK PAIN and Neck Pain       ASSESSMENT /PLAN:  Below is the assessment and plan developed based on review of pertinent history, physical exam, labs, studies, and medications. Patient presents today approximately 4 months s/p recent ACDF. She continues to progress well from a neck standpoint. Radiologic findings reviewed in detail with the patient. She may take OTC medications if needed. She will continue with PT for now and transition to home exercises when she is ready. Informed patient to call with any worsening of symptoms. Otherwise, she will follow up in 2-3 months. 1. S/P cervical spinal fusion  -     XR SPINE CERV PA LAT ODONT 3 V MAX; Future  -     REFERRAL TO PHYSICAL THERAPY; Future      No follow-ups on file. SUBJECTIVE/OBJECTIVE:  Bulmaro Samuels (: 1955) is a 76 y.o. female. Pain Assessment  2023   Location of Pain Back;Neck   Location Modifiers Posterior   Severity of Pain 4   Quality of Pain Dull;Aching        Patient presents today approximately 4 months s/p recent ACDF. She continues to do well in terms of her neck surgery. She states her balance continues to improve. She has been in physical therapy since her last visit. Her back pain continues to improve, her main complaint at this time is some hamstring tightness when standing after sitting for long periods of time. Denies any neck pain or upper extremity radicular symptoms. No radiating pain, numbness or tingling into lower extremities. No acute changes in bowel or bladder control, no saddle anesthesia. Imaging:    XR Results (most recent):  Results from Appointment encounter on 23    XR SPINE CERV PA LAT ODONT 3 V MAX    Narrative  AP and lateral of the cervical spine reviewed today. Stable C5-6 fusion. No acute changes noted.   Alignment maintained         Allergies   Allergen Reactions Lexapro [Escitalopram] Other (comments)     Elevated BP       Current Outpatient Medications   Medication Sig    acetaminophen (TYLENOL) 500 mg tablet Take 500 mg by mouth every six (6) hours as needed for Pain. as prescribed by MD    aspirin delayed-release 81 mg tablet Take 81 mg by mouth daily as needed for Pain. as prescribed by MD    senna-docusate (PERICOLACE) 8.6-50 mg per tablet Take 2 Tablets by mouth daily as needed for Constipation. fish oil-dha-epa 1,200-144-216 mg cap Take 1 Capsule by mouth daily. multivitamin (ONE A DAY) tablet Take 1 Tablet by mouth daily. fexofenadine (ALLEGRA) 180 mg tablet Take 180 mg by mouth daily as needed for Allergies. atorvastatin (LIPITOR) 20 mg tablet Take 20 mg by mouth daily. amLODIPine (NORVASC) 10 mg tablet Take 10 mg by mouth daily. diclofenac EC (VOLTAREN) 75 mg EC tablet Take 1 Tablet by mouth two (2) times daily (with meals). (Patient not taking: Reported on 1/24/2023)    methylPREDNISolone (MEDROL DOSEPACK) 4 mg tablet Per dose pack instructions (Patient not taking: Reported on 1/24/2023)    baclofen (LIORESAL) 20 mg tablet  (Patient not taking: Reported on 1/24/2023)     No current facility-administered medications for this visit. Past Medical History:   Diagnosis Date    Arthritis     High cholesterol     Hypertension     Spine disorder         Past Surgical History:   Procedure Laterality Date    HX GYN      FIBROIDECTOMY    HX HEENT      DENTAL       Family History   Problem Relation Age of Onset    No Known Problems Mother     No Known Problems Father     Diabetes Sister     Heart Attack Brother     Anesth Problems Neg Hx         Social History     Tobacco Use    Smoking status: Never    Smokeless tobacco: Never   Vaping Use    Vaping Use: Never used   Substance Use Topics    Alcohol use: Yes     Comment: rare    Drug use: No        Review of Systems   Constitutional:  Negative for chills and fever.    Musculoskeletal:  Positive for back pain and myalgias. Neurological:  Negative for weakness. Vitals:  Ht 5' 3\" (1.6 m)   Wt 156 lb (70.8 kg)   BMI 27.63 kg/m²    Body mass index is 27.63 kg/m². Physical Exam  Integumentary  Assessment of Surgical Incision - healing and consistent with normal anticipated wound healing. Neurologic  Overall Assessment of Muscle Strength and Tone reveals  Upper Extremities - Right Deltoid - 5/5. Left Deltoid - 5/5. Right Bicep - 5/5. Left Bicep - 5/5. Right Tricep - 5/5. Left Tricep - 5/5. Right Wrist Extensors - 5/5. Left Wrist Extensors - 5/5. Right Wrist Flexors - 5/5. Left Wrist Flexors - 5/5. Right Intrinsics - 5/5. Left Intrinsics - 5/5. General Assessment of Reflexes  Right Hand - Katz's sign is negative in the right hand. Left Hand - Katz's sign is negative in the left hand. Reflexes (Dermatomes)  2/2 Normal - Left Bicep (C5-6), Left Tricep (C7-8), Left Brachioradialis (C5-6), Right Bicep (C5-6), Right Tricep (C7-8) and Right Brachioradialis (C5-6). Musculoskeletal  Global Assessment  Examination of related systems reveals - well-developed, well-nourished, in no acute distress, alert and oriented x 3 and normal coordination. Gait and Station - normal gait and station and normal posture. Spine/Ribs/Pelvis  Cervical Spine - Evaluation of related systems reveals - no lymphadenopathy and neurovascularly intact bilaterally. Inspection and Palpation - Tenderness - moderate and localized. Assessment of pain reveals the following findings: - Location - cervical area. Dr. Francisco Tineo was available for immediate consult during this encounter. An electronic signature was used to authenticate this note.   -- KEYANNA Caban

## 2023-01-27 ENCOUNTER — OFFICE VISIT (OUTPATIENT)
Dept: ORTHOPEDIC SURGERY | Age: 68
End: 2023-01-27
Payer: MEDICARE

## 2023-01-27 DIAGNOSIS — M54.2 NECK PAIN: Primary | ICD-10-CM

## 2023-01-27 DIAGNOSIS — G89.29 CHRONIC BILATERAL LOW BACK PAIN WITHOUT SCIATICA: ICD-10-CM

## 2023-01-27 DIAGNOSIS — M54.50 CHRONIC BILATERAL LOW BACK PAIN WITHOUT SCIATICA: ICD-10-CM

## 2023-01-27 DIAGNOSIS — M25.69 BACK STIFFNESS: ICD-10-CM

## 2023-01-27 NOTE — PROGRESS NOTES
PT DAILY TREATMENT NOTE    Patient Name: Renata Ada  Date:2023  : 1955  [x]  Patient  Verified  Payor: Jacob Melevicentalei / Plan: [address] [place] [address] HMO / Product Type: Managed Care Medicare /    Total Treatment Time (min): 50  1:1 Treatment Time ( [address] only): 40    Referring Provider: Jose Cedillo MD    1. Neck pain  2. Back stiffness  3. Chronic bilateral low back pain without sciatica    SUBJECTIVE  Subjective functional status/changes:   [] No changes reported    Patient states has difficulty walking in PT gym. Fears falling. States not as stiff. OBJECTIVE/TREATMENT  TTP over B quadriceps  PROM hamstrings: 65 degrees B    Manual Therapy x 15 mins:   Manual assistance with bilateral hamstring, KTC, glute passive flexibility exercises in supine in modified Jean test position. Neuromuscular Re-education (NMR) x   minutes:  []  Kinesiotaping for   []  Neuromuscular reeducation to the VMO with use of [place] electrical stimulation in conjunction with active contraction and exercises. []  balance/proprioceptive exercises and activities in clinic as listed below as NMR. Therapeutic Exercise x 25 mins:   Strengthening/Endurance/ADL function/Neuromuscular reeducation activities/exercises supervised and completed as listed below. EXERCISE X= completed on  2023   LTR 10x   DKTC w/ball x   UT stretch x   HS stretch w/strap x   Sit to stands 15x   PPT w/hip ER grn   Forward/lateral arm rasies 15x   cups x   NuStep/bike Man.  10'   Bridging w/t band 20x gr   marching 20x   sidestepping 2 laps                                   Added/Changed Exercises:  []  Advanced to address: [] functional strength/ROM deficits [] balance/proprioceptive tasks  []  Modified: [] per subjective reports [] for patient time constraints [] for clinic time constraints    Modality:  []  E-Stim: type _ x _ min     []att   []unatt   []w/ice   []w/heat  []  Ultrasound: []cont   []pulse    _ W/cm2 x _  min []1MHz   []3MHz  [x]  Ice pack: post      []  Hot pack: pre    []  Other:     Patient Education: [] Review HEP    [] Progressed/Changed HEP to include:  [] positioning   [] body mechanics   [] transfers   [] heat/ice application:        ASSESSMENT  Patient arrives to clinic ambulating w/RW. Encouraged patient to use SC in community. Patient continues to be fearful of falling. Has continued stiffness in BLE. Ambulating w/wide AMARILYS.        Progress towards goals / Updated goals:  PLAN  [x]  Upgrade activities as tolerated      [x]  Continue plan of care  []  Discharge due to:  []  Other:      Co-treatment by Salena Kim, PT 1/27/2023

## 2023-02-07 ENCOUNTER — OFFICE VISIT (OUTPATIENT)
Dept: ORTHOPEDIC SURGERY | Age: 68
End: 2023-02-07
Payer: MEDICARE

## 2023-02-07 DIAGNOSIS — M25.69 BACK STIFFNESS: ICD-10-CM

## 2023-02-07 DIAGNOSIS — M54.2 NECK PAIN: Primary | ICD-10-CM

## 2023-02-07 DIAGNOSIS — G89.29 CHRONIC BILATERAL LOW BACK PAIN WITHOUT SCIATICA: ICD-10-CM

## 2023-02-07 DIAGNOSIS — M54.50 CHRONIC BILATERAL LOW BACK PAIN WITHOUT SCIATICA: ICD-10-CM

## 2023-02-07 PROCEDURE — 97110 THERAPEUTIC EXERCISES: CPT | Performed by: PHYSICAL THERAPIST

## 2023-02-07 PROCEDURE — 97140 MANUAL THERAPY 1/> REGIONS: CPT | Performed by: PHYSICAL THERAPIST

## 2023-02-07 NOTE — PROGRESS NOTES
PT DAILY TREATMENT NOTE    Patient Name: Jeffy Nieto  Date:2023  : 1955  [x]  Patient  Verified  Payor: Didi Joséashok / Plan: [address] [place] [address] HMO / Product Type: Managed Care Medicare /    Total Treatment Time (min): 50  1:1 Treatment Time ( [address] only): 40    Referring Provider: Carlos Liang MD    1. Neck pain  2. Back stiffness  3. Chronic bilateral low back pain without sciatica    SUBJECTIVE  Subjective functional status/changes:   [] No changes reported    Patient states feeling ok. Still scared to walk w/o my cane. OBJECTIVE/TREATMENT  TTP over B quadriceps  PROM hamstrings: 65 degrees B    Manual Therapy x 15 mins:   Manual assistance with bilateral hamstring, KTC, glute passive flexibility exercises in supine in modified Jean test position. Neuromuscular Re-education (NMR) x   minutes:  []  Kinesiotaping for   []  Neuromuscular reeducation to the VMO with use of [place] electrical stimulation in conjunction with active contraction and exercises. []  balance/proprioceptive exercises and activities in clinic as listed below as NMR. Therapeutic Exercise x 25 mins:   Strengthening/Endurance/ADL function/Neuromuscular reeducation activities/exercises supervised and completed as listed below. EXERCISE X= completed on  2023   LTR 10x   DKTC w/ball x   UT stretch x   HS stretch w/strap x   Sit to stands 15x   PPT w/hip ER grn   Forward/lateral arm rasies 15x   cups x   NuStep/bike Man.  10'   Bridging w/t band 20x gr   marching 20x   sidestepping 2 laps                                   Added/Changed Exercises:  []  Advanced to address: [] functional strength/ROM deficits [] balance/proprioceptive tasks  []  Modified: [] per subjective reports [] for patient time constraints [] for clinic time constraints    Modality:  []  E-Stim: type _ x _ min     []att   []unatt   []w/ice   []w/heat  []  Ultrasound: []cont   []pulse    _ W/cm2 x _  min   []1MHz   []3MHz  [x]  Ice pack: post      []  Hot pack: pre    []  Other:     Patient Education: [] Review HEP    [] Progressed/Changed HEP to include:  [] positioning   [] body mechanics   [] transfers   [] heat/ice application:        ASSESSMENT  Patient continues to ambulate w/wide AMARILYS and lack of knee flexion bilaterally. Patient continues to be fearful of falling. Encouraged patient to increase activity at home.        Progress towards goals / Updated goals:  PLAN  [x]  Upgrade activities as tolerated      [x]  Continue plan of care  []  Discharge due to:  []  Other:      Co-treatment by William Leung, PT 2/7/2023

## 2023-02-10 ENCOUNTER — OFFICE VISIT (OUTPATIENT)
Dept: ORTHOPEDIC SURGERY | Age: 68
End: 2023-02-10
Payer: MEDICARE

## 2023-02-10 DIAGNOSIS — G89.29 CHRONIC BILATERAL LOW BACK PAIN WITHOUT SCIATICA: ICD-10-CM

## 2023-02-10 DIAGNOSIS — M54.50 CHRONIC BILATERAL LOW BACK PAIN WITHOUT SCIATICA: ICD-10-CM

## 2023-02-10 DIAGNOSIS — M25.69 BACK STIFFNESS: Primary | ICD-10-CM

## 2023-02-10 DIAGNOSIS — M54.2 NECK PAIN: ICD-10-CM

## 2023-02-10 NOTE — PROGRESS NOTES
PT DAILY TREATMENT NOTE    Patient Name: Isrrael Bashir  Date:2/10/2023  : 1955  [x]  Patient  Verified  Payor: Elin Wiliknsre / Plan: [address] [place] [address] HMO / Product Type: Managed Care Medicare /    Total Treatment Time (min): 50  1:1 Treatment Time ([facility] only): 30    Referring Provider: Trent Jamison MD    1. Back stiffness  2. Chronic bilateral low back pain without sciatica  3. Neck pain    SUBJECTIVE  Subjective functional status/changes:   [] No changes reported    Patient she thinks she is feeling stronger, better. Improving slowly. OBJECTIVE/TREATMENT    PROM hamstrings: 65 degrees B    Manual Therapy x 15 mins:   Manual assistance with bilateral hamstring, KTC, glute passive flexibility exercises in supine in modified Jean test position. Neuromuscular Re-education (NMR) x   minutes:  []  Kinesiotaping for   []  Neuromuscular reeducation to the VMO with use of [place] electrical stimulation in conjunction with active contraction and exercises. []  balance/proprioceptive exercises and activities in clinic as listed below as NMR. Therapeutic Exercise x 15 mins:   Strengthening/Endurance/ADL function/Neuromuscular reeducation activities/exercises supervised and completed as listed below. EXERCISE X= completed on  2/10/2023   LTR 10x   DKTC w/ball x   UT stretch x   HS stretch w/strap x   Sit to stands 15x   PPT w/hip ER grn   Forward/lateral arm rasies 15x   cups x   NuStep/bike Man.  10'   Bridging w/t band 20x gr   marching 20x   sidestepping 2 laps                                   Added/Changed Exercises:  []  Advanced to address: [] functional strength/ROM deficits [] balance/proprioceptive tasks  []  Modified: [] per subjective reports [] for patient time constraints [] for clinic time constraints    Modality:  []  E-Stim: type _ x _ min     []att   []unatt   []w/ice   []w/heat  []  Ultrasound: []cont   []pulse    _ W/cm2 x _  min   []1MHz   []3MHz  [x]  Ice pack: post []  Hot pack: pre    []  Other:     Patient Education: [] Review HEP    [] Progressed/Changed HEP to include:  [] positioning   [] body mechanics   [] transfers   [] heat/ice application:        ASSESSMENT  Patient ambulating w/wide AMARILYS w/decreased hip/knee flexion. Continues to be fearful of ambulating w/o AD. Has increased tolerance of exercise. Encourage patient to do HEP and increase activity at home.         Progress towards goals / Updated goals:  PLAN  [x]  Upgrade activities as tolerated      [x]  Continue plan of care  []  Discharge due to:  []  Other:      Co-treatment by Laraine Bloch, PT 2/10/2023

## 2023-02-14 ENCOUNTER — OFFICE VISIT (OUTPATIENT)
Dept: ORTHOPEDIC SURGERY | Age: 68
End: 2023-02-14
Payer: MEDICARE

## 2023-02-14 DIAGNOSIS — M54.2 NECK PAIN: ICD-10-CM

## 2023-02-14 DIAGNOSIS — M54.50 CHRONIC BILATERAL LOW BACK PAIN WITHOUT SCIATICA: ICD-10-CM

## 2023-02-14 DIAGNOSIS — G89.29 CHRONIC BILATERAL LOW BACK PAIN WITHOUT SCIATICA: ICD-10-CM

## 2023-02-14 DIAGNOSIS — M25.69 BACK STIFFNESS: Primary | ICD-10-CM

## 2023-02-14 NOTE — PROGRESS NOTES
PT DAILY TREATMENT NOTE    Patient Name: Mahin Brown  Date:2023  : 1955  [x]  Patient  Verified  Payor: Halle Vickers / Plan: [address] [place] [address] HMO / Product Type: Managed Care Medicare /    Total Treatment Time (min): 50  1:1 Treatment Time ( [address] only): 30    Referring Provider: Christelle Farley MD    1. Back stiffness  2. Chronic bilateral low back pain without sciatica  3. Neck pain    SUBJECTIVE  Subjective functional status/changes:   [] No changes reported    Patient states that she is feeling stiff this AM. Walking is difficult. OBJECTIVE/TREATMENT    PROM hamstrings: 55 degrees B    Manual Therapy x 15 mins:   Manual assistance with bilateral hamstring, KTC, glute passive flexibility exercises in supine and hip flexor stretch in modified Jean test position. Neuromuscular Re-education (NMR) x   minutes:  []  Kinesiotaping for   []  Neuromuscular reeducation to the VMO with use of [place] electrical stimulation in conjunction with active contraction and exercises. []  balance/proprioceptive exercises and activities in clinic as listed below as NMR. Therapeutic Exercise x 15 mins:   Strengthening/Endurance/ADL function/Neuromuscular reeducation activities/exercises supervised and completed as listed below. EXERCISE X= completed on  2023   LTR 10x   DKTC w/ball x   UT stretch x   HS stretch w/strap x   Sit to stands 15x   PPT w/hip ER grn   Forward/lateral arm rasies 15x   cups x   NuStep/bike Man.  10'   Bridging w/t band 20x gr   marching 20x   sidestepping 2 laps                                   Added/Changed Exercises:  []  Advanced to address: [] functional strength/ROM deficits [] balance/proprioceptive tasks  []  Modified: [] per subjective reports [] for patient time constraints [] for clinic time constraints    Modality:  []  E-Stim: type _ x _ min     []att   []unatt   []w/ice   []w/heat  []  Ultrasound: []cont   []pulse    _ W/cm2 x _  min   []1MHz []3MHz  [x]  Ice pack: post      []  Hot pack: pre    []  Other:     Patient Education: [] Review HEP    [] Progressed/Changed HEP to include:  [] positioning   [] body mechanics   [] transfers   [] heat/ice application:        ASSESSMENT  Patient has increased stiffness in BLE. Having difficulty with ambulation. Tolerating exercise and has increased mobility after PROM.    Progress towards goals / Updated goals:  PLAN  [x]  Upgrade activities as tolerated      [x]  Continue plan of care  []  Discharge due to:  []  Other:      Co-treatment by Chun Dobson, PT 2/14/2023

## 2023-02-22 ENCOUNTER — OFFICE VISIT (OUTPATIENT)
Dept: ORTHOPEDIC SURGERY | Age: 68
End: 2023-02-22
Payer: MEDICARE

## 2023-02-22 DIAGNOSIS — M25.69 BACK STIFFNESS: Primary | ICD-10-CM

## 2023-02-22 DIAGNOSIS — Z98.1 S/P CERVICAL SPINAL FUSION: ICD-10-CM

## 2023-02-22 DIAGNOSIS — G89.29 CHRONIC BILATERAL LOW BACK PAIN WITHOUT SCIATICA: ICD-10-CM

## 2023-02-22 DIAGNOSIS — M54.50 CHRONIC BILATERAL LOW BACK PAIN WITHOUT SCIATICA: ICD-10-CM

## 2023-02-22 DIAGNOSIS — M54.2 NECK PAIN: ICD-10-CM

## 2023-02-22 PROCEDURE — 97110 THERAPEUTIC EXERCISES: CPT

## 2023-02-22 PROCEDURE — 97140 MANUAL THERAPY 1/> REGIONS: CPT

## 2023-02-22 NOTE — PROGRESS NOTES
PT DAILY TREATMENT NOTE    Patient Name: Reji Whalen  Date:2023  : 1955  [x]  Patient  Verified  Payor: Supa Abreu / Plan: [address] [place] [address] HMO / Product Type: Managed Care Medicare /    Total Treatment Time (min): 60  1:1 Treatment Time ( [address] only): 39    Referring Provider: Taylor Pugh MD    1. Back stiffness  2. Chronic bilateral low back pain without sciatica  3. Neck pain  4. S/P cervical spinal fusion    SUBJECTIVE  Subjective functional status/changes:   [] No changes reported    Patient reports she is sore from doing a lot of walking over the weekend. OBJECTIVE/TREATMENT    PROM hamstrings: 55 degrees B    Manual Therapy x 15 mins:   Manual assistance with bilateral hamstring, KTC, glute passive flexibility exercises in supine and hip flexor stretch in modified Jean test position. Neuromuscular Re-education (NMR) x   minutes:  []  Kinesiotaping for   []  Neuromuscular reeducation to the VMO with use of [place] electrical stimulation in conjunction with active contraction and exercises. []  balance/proprioceptive exercises and activities in clinic as listed below as NMR. Therapeutic Exercise x 30 mins:   Strengthening/Endurance/ADL function/Neuromuscular reeducation activities/exercises supervised and completed as listed below. EXERCISE X= completed on  2023   LTR 10x   DKTC w/ball x   UT stretch x   HS stretch w/strap x   Sit to stands 15x   PPT w/hip ER grn   Forward/lateral arm rasies 15x   cups x   NuStep/bike Man.  10'   Bridging w/t band 20x gr   marching 20x   sidestepping 2 laps                                   Added/Changed Exercises:  []  Advanced to address: [] functional strength/ROM deficits [] balance/proprioceptive tasks  []  Modified: [] per subjective reports [] for patient time constraints [] for clinic time constraints    Modality:  []  E-Stim: type _ x _ min     []att   []unatt   []w/ice   []w/heat  []  Ultrasound: []cont   []pulse _ W/cm2 x _  min   []1MHz   []3MHz  [x]  Ice pack: post      []  Hot pack: pre    []  Other:     Patient Education: [] Review HEP    [] Progressed/Changed HEP to include:  [] positioning   [] body mechanics   [] transfers   [] heat/ice application:        ASSESSMENT    Ambulating in clinic with SPC, increased step length with improved foot clearance and heel strike. Still slow and guarded with transfers, walking and directional changes, but does seem to have less difficulty overall with mobility.      Progress towards goals / Updated goals:  PLAN  [x]  Upgrade activities as tolerated      [x]  Continue plan of care  []  Discharge due to:  []  Other:      Co-treatment by Emily Chaudhary, PTA 2/22/2023

## 2023-02-22 NOTE — PROGRESS NOTES
I have reviewed the notes, assessments, and/or procedures performed by Alina Pandya PTA, I concur with her/his documentation of Shraddha Meza.

## 2023-02-24 ENCOUNTER — OFFICE VISIT (OUTPATIENT)
Dept: ORTHOPEDIC SURGERY | Age: 68
End: 2023-02-24
Payer: MEDICARE

## 2023-02-24 DIAGNOSIS — G89.29 CHRONIC BILATERAL LOW BACK PAIN WITHOUT SCIATICA: ICD-10-CM

## 2023-02-24 DIAGNOSIS — M25.69 BACK STIFFNESS: Primary | ICD-10-CM

## 2023-02-24 DIAGNOSIS — M54.2 NECK PAIN: ICD-10-CM

## 2023-02-24 DIAGNOSIS — M54.50 CHRONIC BILATERAL LOW BACK PAIN WITHOUT SCIATICA: ICD-10-CM

## 2023-02-24 NOTE — PROGRESS NOTES
PT DAILY TREATMENT NOTE    Patient Name: Gillian Gordon  Date:2023  : 1955  [x]  Patient  Verified  Payor: Sera Arguello / Plan: [address] [place] [address] HMO / Product Type: Managed Care Medicare /    Total Treatment Time (min): 60  1:1 Treatment Time ( [address] only): 30    Referring Provider: Riaz Najera MD    1. Back stiffness  2. Neck pain  3. Chronic bilateral low back pain without sciatica    SUBJECTIVE  Subjective functional status/changes:   [] No changes reported    Patient reports that she is stiff, but doing better. OBJECTIVE/TREATMENT    PROM hamstrings: 55 degrees B    Manual Therapy x 15 mins:   Manual assistance with bilateral hamstring, KTC, glute passive flexibility exercises in supine and hip flexor stretch in modified Jean test position. Neuromuscular Re-education (NMR) x   minutes:  []  Kinesiotaping for   []  Neuromuscular reeducation to the VMO with use of [place] electrical stimulation in conjunction with active contraction and exercises. []  balance/proprioceptive exercises and activities in clinic as listed below as NMR. Therapeutic Exercise x 15 mins:   Strengthening/Endurance/ADL function/Neuromuscular reeducation activities/exercises supervised and completed as listed below. EXERCISE X= completed on  2023   LTR 10x   DKTC w/ball x   UT stretch x   HS stretch w/strap x   Sit to stands 15x   PPT w/hip ER grn   Forward/lateral arm rasies 15x   cups x   NuStep/bike Man.  10'   Bridging w/t band 20x gr   marching 20x   sidestepping 2 laps   Gait w/head turns x                               Added/Changed Exercises:  []  Advanced to address: [] functional strength/ROM deficits [] balance/proprioceptive tasks  []  Modified: [] per subjective reports [] for patient time constraints [] for clinic time constraints    Modality:  []  E-Stim: type _ x _ min     []att   []unatt   []w/ice   []w/heat  []  Ultrasound: []cont   []pulse    _ W/cm2 x _  min   []1MHz []3MHz  [x]  Ice pack: post      []  Hot pack: pre    []  Other:     Patient Education: [] Review HEP    [] Progressed/Changed HEP to include:  [] positioning   [] body mechanics   [] transfers   [] heat/ice application:        ASSESSMENT  Patient continues to ambulate w/wide AMARILYS and decreased knee flexion w/SC. Has continued stiffness in bilateral hamstrings. Patient continues to be fearful of falling w/ambulation.      Progress towards goals / Updated goals:  PLAN  [x]  Upgrade activities as tolerated      [x]  Continue plan of care  []  Discharge due to:  []  Other:      Co-treatment by Juliocesar Schneider, PT 2/24/2023

## 2023-03-01 ENCOUNTER — OFFICE VISIT (OUTPATIENT)
Dept: ORTHOPEDIC SURGERY | Age: 68
End: 2023-03-01

## 2023-03-01 DIAGNOSIS — M25.69 BACK STIFFNESS: Primary | ICD-10-CM

## 2023-03-01 DIAGNOSIS — M54.50 CHRONIC BILATERAL LOW BACK PAIN WITHOUT SCIATICA: ICD-10-CM

## 2023-03-01 DIAGNOSIS — M54.2 NECK PAIN: ICD-10-CM

## 2023-03-01 DIAGNOSIS — G89.29 CHRONIC BILATERAL LOW BACK PAIN WITHOUT SCIATICA: ICD-10-CM

## 2023-03-01 NOTE — PROGRESS NOTES
PT DAILY TREATMENT NOTE    Patient Name: Danna Chavez  Date:3/1/2023  : 1955  [x]  Patient  Verified  Payor: Magda Thompsonble / Plan: [address] [place] [address] HMO / Product Type: Managed Care Medicare /    Total Treatment Time (min): 60  1:1 Treatment Time ( [address] only): 40    Referring Provider: Charly Mendoza MD    1. Back stiffness  2. Neck pain  3. Chronic bilateral low back pain without sciatica    SUBJECTIVE  Subjective functional status/changes:   [] No changes reported    Patient reports she feels stiff and tired today. OBJECTIVE/TREATMENT    PROM hamstrings: 55 degrees B    Manual Therapy x 15 mins:   Manual assistance with bilateral hamstring, KTC, glute passive flexibility exercises in supine and hip flexor stretch in modified Jean test position. Neuromuscular Re-education (NMR) x   minutes:  []  Kinesiotaping for   []  Neuromuscular reeducation to the VMO with use of [place] electrical stimulation in conjunction with active contraction and exercises. []  balance/proprioceptive exercises and activities in clinic as listed below as NMR. Therapeutic Exercise x 25 mins:   Strengthening/Endurance/ADL function/Neuromuscular reeducation activities/exercises supervised and completed as listed below. EXERCISE X= completed on  3/1/2023   LTR 10x   DKTC w/ball x   UT stretch x   HS stretch w/strap x   Sit to stands 15x   PPT w/hip ER grn   Forward/lateral arm rasies 15x   cups x   NuStep/bike Man.  10'   Bridging w/t band 20x gr   marching 20x   sidestepping 2 laps   Gait w/head turns x                               Added/Changed Exercises:  []  Advanced to address: [] functional strength/ROM deficits [] balance/proprioceptive tasks  []  Modified: [] per subjective reports [] for patient time constraints [] for clinic time constraints    Modality:  []  E-Stim: type _ x _ min     []att   []unatt   []w/ice   []w/heat  []  Ultrasound: []cont   []pulse    _ W/cm2 x _  min   []1MHz   []3MHz  [x] Ice pack: post      []  Hot pack: pre    []  Other:     Patient Education: [] Review HEP    [] Progressed/Changed HEP to include:  [] positioning   [] body mechanics   [] transfers   [] heat/ice application:        ASSESSMENT    Patient subjectively feels better after today's exercises and stretching. Still fearful of falling and guarded during gait. Using FWW for long distances in the community but is able to walk slowly with widened AMARILYS without AD here in clinic.      Progress towards goals / Updated goals:  PLAN  [x]  Upgrade activities as tolerated      [x]  Continue plan of care  []  Discharge due to:  []  Other:      Co-treatment by Jena Bowers, HARMOYN 3/1/2023

## 2023-03-01 NOTE — PROGRESS NOTES
I have reviewed the notes, assessments, and/or procedures performed by Moshe Trejo PTA, I concur with her/his documentation of Bridget Garcia.

## 2023-03-08 ENCOUNTER — OFFICE VISIT (OUTPATIENT)
Dept: ORTHOPEDIC SURGERY | Age: 68
End: 2023-03-08

## 2023-03-08 DIAGNOSIS — G89.29 CHRONIC BILATERAL LOW BACK PAIN WITHOUT SCIATICA: ICD-10-CM

## 2023-03-08 DIAGNOSIS — M25.69 BACK STIFFNESS: Primary | ICD-10-CM

## 2023-03-08 DIAGNOSIS — M54.50 CHRONIC BILATERAL LOW BACK PAIN WITHOUT SCIATICA: ICD-10-CM

## 2023-03-08 DIAGNOSIS — M54.2 NECK PAIN: ICD-10-CM

## 2023-03-08 NOTE — PROGRESS NOTES
PT DAILY TREATMENT NOTE    Patient Name: Omid Beck  Date:3/8/2023  : 1955  [x]  Patient  Verified  Payor: Farhana Morris / Plan: [employer] HMO / Product Type: Managed Care Medicare /    Total Treatment Time (min): 60  1:1 Treatment Time ([facility] only): 30    Referring Provider: Bri Raya MD    1. Back stiffness  2. Neck pain  3. Chronic bilateral low back pain without sciatica    SUBJECTIVE  Subjective functional status/changes:   [] No changes reported    Patient reports feels stiff, but better. OBJECTIVE/TREATMENT    PROM hamstrings: 55 degrees B    Manual Therapy x 15 mins:   Manual assistance with bilateral hamstring, KTC, glute passive flexibility exercises in supine and hip flexor stretch in modified Jean test position. Neuromuscular Re-education (NMR) x   minutes:  []  Kinesiotaping for   []  Neuromuscular reeducation to the VMO with use of [place] electrical stimulation in conjunction with active contraction and exercises. []  balance/proprioceptive exercises and activities in clinic as listed below as NMR. Therapeutic Exercise x 25 mins:   Strengthening/Endurance/ADL function/Neuromuscular reeducation activities/exercises supervised and completed as listed below. EXERCISE X= completed on  3/8/2023   LTR 10x   DKTC w/ball x   UT stretch x   HS stretch w/strap x   Sit to stands 15x   PPT w/hip ER grn   Forward/lateral arm rasies 15x   cups x   NuStep/bike Man.  10'   Bridging w/t band 20x gr   marching 20x   sidestepping 2 laps   Gait w/head turns x                               Added/Changed Exercises:  []  Advanced to address: [] functional strength/ROM deficits [] balance/proprioceptive tasks  []  Modified: [] per subjective reports [] for patient time constraints [] for clinic time constraints    Modality:  []  E-Stim: type _ x _ min     []att   []unatt   []w/ice   []w/heat  []  Ultrasound: []cont   []pulse    _ W/cm2 x _  min   []1MHz   []3MHz  [x]  Ice pack: post      []  Hot pack: pre    []  Other:     Patient Education: [] Review HEP    [] Progressed/Changed HEP to include:  [] positioning   [] body mechanics   [] transfers   [] heat/ice application:        ASSESSMENT  Patient has improved balance. Able to  tandem stance w/o assist. Continues to be stiff w/hamstring mobility. Ambulating w/wide AMARILYS.      Progress towards goals / Updated goals:  PLAN  [x]  Upgrade activities as tolerated      [x]  Continue plan of care  []  Discharge due to:  []  Other:      Co-treatment by Martha Martinez, PT 3/8/2023

## 2023-04-27 ENCOUNTER — OFFICE VISIT (OUTPATIENT)
Dept: ORTHOPEDIC SURGERY | Age: 68
End: 2023-04-27

## 2023-04-27 VITALS — HEIGHT: 63 IN | WEIGHT: 156 LBS | BODY MASS INDEX: 27.64 KG/M2

## 2023-04-27 DIAGNOSIS — M54.50 CHRONIC BILATERAL LOW BACK PAIN WITHOUT SCIATICA: ICD-10-CM

## 2023-04-27 DIAGNOSIS — M48.062 SPINAL STENOSIS OF LUMBAR REGION WITH NEUROGENIC CLAUDICATION: ICD-10-CM

## 2023-04-27 DIAGNOSIS — G89.29 CHRONIC BILATERAL LOW BACK PAIN WITHOUT SCIATICA: ICD-10-CM

## 2023-04-27 DIAGNOSIS — M43.16 SPONDYLOLISTHESIS OF LUMBAR REGION: ICD-10-CM

## 2023-04-27 DIAGNOSIS — Z98.1 S/P CERVICAL SPINAL FUSION: Primary | ICD-10-CM

## 2023-04-27 NOTE — PROGRESS NOTES
Ketty Concepcion (: 1955) is a 76 y.o. female, patient, here for evaluation of the following chief complaint(s):  Neck Pain       ASSESSMENT /PLAN:  Below is the assessment and plan developed based on review of pertinent history, physical exam, labs, studies, and medications. Patient presents today approximately 6 months s/p recent ACDF. She continues to progress well from a neck standpoint. Radiologic findings reviewed in detail with the patient. She is doing well in terms of her neck. However she still having significant symptoms with neurogenic claudication and her walking. While I do think some of this is related to residual cervical myelopathy I think that the lumbar stenosis is fairly severe at L3-4 and L4-5. Ultimately to improve her walking function I think he needs a laminectomy at L3-4 L4-5. I do not think he needs a fusion though. Procedure: Lumbar laminectomy L3-4 and L4-5, possible L2-3      The risks and benefits were discussed at length with the patient and the patient has elected to proceed. Indications for surgery include failed conservative treatment. Alternative treatments, risks and the perioperative course were discussed with the patient. All questions were answered. The risks and benefits of the procedure were explained. Benefits include definitive diagnosis, relief of pain, elimination of deformity and improved function. Risks of surgery including bleeding, infection, weakness, numbness, CSF leak, failure to improve symptoms, exacerbation of medical co-morbidities and even death were discussed with the patient. 1. S/P cervical spinal fusion  -     XR SPINE CERV 4 OR 5 V; Future  2. Chronic bilateral low back pain without sciatica  -     REFERRAL TO ORTHOPEDIC SURGERY; Future  3. Spinal stenosis of lumbar region with neurogenic claudication  -     REFERRAL TO ORTHOPEDIC SURGERY; Future  4.  Spondylolisthesis of lumbar region  -     REFERRAL TO ORTHOPEDIC SURGERY; Future      No follow-ups on file. SUBJECTIVE/OBJECTIVE:  Reji Whalen (: 1955) is a 76 y.o. female. Pain Assessment  2023   Location of Pain Neck;Leg   Location Modifiers Right;Left   Severity of Pain 5   Quality of Pain Aching   Limiting Behavior Yes   Relieving Factors Ice   Result of Injury No        Patient presents today approximately 46months s/p recent ACDF. She continues to do well in terms of her neck surgery. She states her balance continues to improve. She has been in physical therapy since her last visit. Her back pain continues to improve, her main complaint at this time is some hamstring tightness when standing after sitting for long periods of time. Denies any neck pain or upper extremity radicular symptoms. No radiating pain, numbness or tingling into lower extremities. No acute changes in bowel or bladder control, no saddle anesthesia. She still has difficulty with prolonged walking and standing with mainly stiffness in her legs when she tries to ambulate and walk. She has no neck pain. She has no upper extremity symptoms. Imaging:    XR Results (most recent):  Results from Appointment encounter on 23    XR SPINE CERV 4 OR 5 V    Narrative  AP and lateral flexion-extension cervical spine demonstrate stable ACDF at C5-6. No hardware difficulties. Fusion noted. No instability. MRI Results (most recent):  Results from [place] encounter on 22    MRI CERV SPINE WO CONT    Narrative  EXAM: MRI CERV SPINE WO CONT    INDICATION: Neck pain. COMPARISON: Radiographs 2012 and 4/10/2008. TECHNIQUE: MR imaging of the cervical spine was performed using the following  sequences: sagittal T1, T2, STIR;  axial T2, T1.    CONTRAST:  None. FINDINGS:    There is diminished transaxial dimension of the cord at the C5-6 level.  There is  bilateral paramedian cord T2-STIR signal hyperintensity extending from the mid  C5 through mid C6 levels, more prominent on the right. Vertebral body heights are normal. There is straightening of cervical lordosis  with 2 mm retrolisthesis at C5-6. There are moderately prominent type II  discogenic endplate signal changes centrally and on the left at C5-6. Additionally, there are 12 mm hemangioma incidentally noted at C7 vertebral body  on the right posterolaterally and T1 on the left anterolaterally. No paraspinal or intraspinal mass is shown. .    C2-C3: Mildly diminished is height. Mild disc bulging accentuated in the right  and left lateral regions. Small right uncovertebral osteophytes. No substantial  facet arthrosis. No canal or foraminal stenosis. C3-C4: Mild disc space narrowing. Mild diffuse disc bulging, accentuated in the  left lateral region, and small bilateral uncovertebral osteophytes, larger on  the right. No facet arthrosis. Mild canal stenosis with midline AP canal  dimension of 9 mm. No cord compression. Mild-moderate bilateral foraminal  stenosis. C4-C5: Mild-moderate disc space narrowing. Mild diffuse disc bulge with left  paracentral accentuation. Small bilateral uncovertebral osteophytes. No facet  arthropathy. Mild-moderate canal stenosis with mild cord compression. Moderate  to severe left and moderate right foraminal stenosis. C5-C6: Moderate to severe disc space narrowing. Moderate diffuse disc osteophyte  complex formation and small central disc protrusion. No substantial facet  arthrosis. Severe canal stenosis with AP canal dimension reduced to 5 mm. Moderate to severe cord compression. Moderate to severe bilateral foraminal  stenosis, greater on the left. C6-C7: Normal disc height. No disc herniation or bulging. No facet arthropathy. No canal or foraminal stenosis. C7-T1: Normal disc height. No disc herniation or bulging. No facet arthropathy. No canal or foraminal stenosis. T1-T2, T2-3 and T3-T4: Shown on sagittal images only. Mild disc space narrowing  at T3-4.  No disc herniation or substantial disc bulging. No facet arthropathy or  canal-foraminal stenosis demonstrated. Impression  Multilevel degenerative findings detailed by level above. Note the followin. C5-6 severe canal stenosis with moderate to severe cord compression. 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 2. C4-5 mild-moderate canal stenosis with mild cord compression. 3. C3-4 mild canal stenosis. 4. Foraminal stenosis which is moderate to severe on the left at C4-5, and  moderate to severe bilaterally at C5-6. Allergies   Allergen Reactions    Lexapro [Escitalopram] Other (comments)     Elevated BP       Current Outpatient Medications   Medication Sig    acetaminophen (TYLENOL) 500 mg tablet Take 1 Tablet by mouth every six (6) hours as needed for Pain. as prescribed by MD    aspirin delayed-release 81 mg tablet Take 1 Tablet by mouth daily as needed for Pain. as prescribed by MD    senna-docusate (PERICOLACE) 8.6-50 mg per tablet Take 2 Tablets by mouth daily as needed for Constipation. fish oil-dha-epa 1,200-144-216 mg cap Take 1 Capsule by mouth daily. multivitamin (ONE A DAY) tablet Take 1 Tablet by mouth daily. fexofenadine (ALLEGRA) 180 mg tablet Take 1 Tablet by mouth daily as needed for Allergies. baclofen (LIORESAL) 20 mg tablet     atorvastatin (LIPITOR) 20 mg tablet Take 1 Tablet by mouth daily. amLODIPine (NORVASC) 10 mg tablet Take 1 Tablet by mouth daily. diclofenac EC (VOLTAREN) 75 mg EC tablet Take 1 Tablet by mouth two (2) times daily (with meals). (Patient not taking: Reported on 2023)    methylPREDNISolone (MEDROL DOSEPACK) 4 mg tablet Per dose pack instructions (Patient not taking: Reported on 2023)     No current facility-administered medications for this visit.        Past Medical History:   Diagnosis Date    Arthritis     High cholesterol     Hypertension     Spine disorder         Past Surgical History: Procedure Laterality Date    HX GYN      FIBROIDECTOMY    HX HEENT      DENTAL       Family History   Problem Relation Age of Onset    No Known Problems Mother     No Known Problems Father     Diabetes Sister     Heart Attack Brother     Anesth Problems Neg Hx         Social History     Tobacco Use    Smoking status: Never    Smokeless tobacco: Never   Vaping Use    Vaping Use: Never used   Substance Use Topics    Alcohol use: Yes     Comment: rare    Drug use: No        Review of Systems   Constitutional:  Negative for chills and fever. Musculoskeletal:  Positive for back pain and myalgias. Neurological:  Negative for weakness. All other systems reviewed and are negative. Vitals:  Ht 5' 3\" (1.6 m)   Wt 156 lb (70.8 kg)   BMI 27.63 kg/m²    Body mass index is 27.63 kg/m². Physical Exam  Integumentary  Assessment of Surgical Incision - healing and consistent with normal anticipated wound healing. Neurologic  Overall Assessment of Muscle Strength and Tone reveals  Upper Extremities - Right Deltoid - 5/5. Left Deltoid - 5/5. Right Bicep - 5/5. Left Bicep - 5/5. Right Tricep - 5/5. Left Tricep - 5/5. Right Wrist Extensors - 5/5. Left Wrist Extensors - 5/5. Right Wrist Flexors - 5/5. Left Wrist Flexors - 5/5. Right Intrinsics - 5/5. Left Intrinsics - 5/5. General Assessment of Reflexes  Right Hand - Katz's sign is negative in the right hand. Left Hand - Katz's sign is negative in the left hand. Reflexes (Dermatomes)  2/2 Normal - Left Bicep (C5-6), Left Tricep (C7-8), Left Brachioradialis (C5-6), Right Bicep (C5-6), Right Tricep (C7-8) and Right Brachioradialis (C5-6). Musculoskeletal  Global Assessment  Examination of related systems reveals - well-developed, well-nourished, in no acute distress, alert and oriented x 3 and normal coordination. Gait and Station - normal gait and station and normal posture.   Spine/Ribs/Pelvis  Cervical Spine - Evaluation of related systems reveals - no lymphadenopathy and neurovascularly intact bilaterally. Inspection and Palpation - Tenderness - moderate and localized. Assessment of pain reveals the following findings: - Location - cervical area. Examination of the lumbar spine demonstrates flattening of lumbar lordosis. No step-offs noted. No real surgical incisions. No pelvic obliquity. Minimal tenderness to palpation lumbar spine. No muscle spasm. Motor testing demonstrates no gross deficits today. An electronic signature was used to authenticate this note.   -- Nichole Gonzalez MD

## 2023-04-27 NOTE — PATIENT INSTRUCTIONS
Lumbar Laminectomy: Before Your Surgery  What is a lumbar laminectomy? A lumbar laminectomy is surgery to ease pressure on the spinal cord and/or nerves of the lower spine. This is also called decompression surgery. The doctor makes a cut in the lower back. This cut is called an incision. Then the doctor takes out pieces of bone that are squeezing the spinal cord and nerves. The doctor may also take out other tissues. Many people have less pain soon after surgery. But your back may feel stiff and sore for a few months. Depending on the type of surgery you have, and your health, you may go home the same day. Or you may stay in the hospital for 1 or 2 days. You will likely return to work in 2 to 4 weeks. But if your job requires physical labor, it may take 4 to 8 weeks. How do you prepare for surgery? Surgery can be stressful. This information will help you understand what you can expect. And it will help you safely prepare for surgery. Preparing for surgery    Be sure you have someone to take you home. Anesthesia and pain medicine will make it unsafe for you to drive or get home on your own. Understand exactly what surgery is planned, along with the risks, benefits, and other options. If you take a medicine that prevents blood clots, your doctor may tell you to stop taking it before your surgery. Or your doctor may tell you to keep taking it. (These medicines include aspirin and other blood thinners.) Make sure that you understand exactly what your doctor wants you to do. Tell your doctor ALL the medicines, vitamins, supplements, and herbal remedies you take. Some may increase the risk of problems during your surgery. Your doctor will tell you if you should stop taking any of them before the surgery and how soon to do it. Make sure your doctor and the hospital have a copy of your advance directive. If you don't have one, you may want to prepare one.  It lets others know your health care wishes. It's a good thing to have before any type of surgery or procedure. What happens on the day of surgery? Follow the instructions exactly about when to stop eating and drinking. If you don't, your surgery may be canceled. If your doctor has told you to take your medicines on the day of surgery, take them with only a sip of water. Take a bath or shower before you come in for your surgery. Do not apply lotions, perfumes, deodorants, or nail polish. Do not shave the surgical site yourself. Take off all jewelry and piercings. And take out contact lenses, if you wear them. At the hospital or surgery center   Bring a picture ID. The area for surgery is often marked to make sure there are no errors. You will be kept comfortable and safe by your anesthesia provider. You will be asleep during the surgery. The surgery will take about 1 to 1½ hours. If a spinal fusion is done at the same time, surgery will last 2 to 4 hours. When should you call your doctor? You have questions or concerns. You don't understand how to prepare for your surgery. You become ill before the surgery (such as fever, flu, or a cold). You need to reschedule or have changed your mind about having the surgery. Where can you learn more? Go to http://www.gray.com/  Enter A741 in the search box to learn more about \"Lumbar Laminectomy: Before Your Surgery. \"  Current as of: November 9, 2022               Content Version: 13.6  © 2006-2023 [employer]. Care instructions adapted under license by [employer] (which disclaims liability or warranty for this information). If you have questions about a medical condition or this instruction, always ask your healthcare professional. [address] any warranty or liability for your use of this information.

## 2023-04-28 ENCOUNTER — TELEPHONE (OUTPATIENT)
Dept: ORTHOPEDIC SURGERY | Age: 68
End: 2023-04-28

## 2024-02-07 PROBLEM — M54.50 LOW BACK PAIN: Status: ACTIVE | Noted: 2024-02-07

## 2024-02-07 PROBLEM — M48.062 SPINAL STENOSIS, LUMBAR REGION, WITH NEUROGENIC CLAUDICATION: Status: ACTIVE | Noted: 2024-02-07

## 2024-02-14 ENCOUNTER — HOSPITAL ENCOUNTER (OUTPATIENT)
Facility: HOSPITAL | Age: 69
Discharge: HOME | End: 2024-02-17
Payer: COMMERCIAL

## 2024-02-14 VITALS
BODY MASS INDEX: 27.64 KG/M2 | TEMPERATURE: 98 F | WEIGHT: 150.2 LBS | HEIGHT: 62 IN | HEART RATE: 69 BPM | RESPIRATION RATE: 20 BRPM | DIASTOLIC BLOOD PRESSURE: 76 MMHG | SYSTOLIC BLOOD PRESSURE: 149 MMHG

## 2024-02-14 LAB
ABO + RH BLD: NORMAL
ANION GAP SERPL CALC-SCNC: 5 MMOL/L (ref 5–15)
APPEARANCE UR: CLEAR
BACTERIA URNS QL MICRO: NEGATIVE /HPF
BASOPHILS # BLD: 0 K/UL (ref 0–0.1)
BASOPHILS NFR BLD: 1 % (ref 0–1)
BILIRUB UR QL: NEGATIVE
BLOOD GROUP ANTIBODIES SERPL: NORMAL
BUN SERPL-MCNC: 11 MG/DL (ref 6–20)
BUN/CREAT SERPL: 13 (ref 12–20)
CALCIUM SERPL-MCNC: 9.6 MG/DL (ref 8.5–10.1)
CHLORIDE SERPL-SCNC: 106 MMOL/L (ref 97–108)
CO2 SERPL-SCNC: 27 MMOL/L (ref 21–32)
COLOR UR: NORMAL
CREAT SERPL-MCNC: 0.85 MG/DL (ref 0.55–1.02)
DIFFERENTIAL METHOD BLD: NORMAL
EOSINOPHIL # BLD: 0.1 K/UL (ref 0–0.4)
EOSINOPHIL NFR BLD: 1 % (ref 0–7)
EPITH CASTS URNS QL MICRO: NORMAL /LPF
ERYTHROCYTE [DISTWIDTH] IN BLOOD BY AUTOMATED COUNT: 11.9 % (ref 11.5–14.5)
EST. AVERAGE GLUCOSE BLD GHB EST-MCNC: 100 MG/DL
GLUCOSE SERPL-MCNC: 102 MG/DL (ref 65–100)
GLUCOSE UR STRIP.AUTO-MCNC: NEGATIVE MG/DL
HBA1C MFR BLD: 5.1 % (ref 4–5.6)
HCT VFR BLD AUTO: 38.4 % (ref 35–47)
HGB BLD-MCNC: 12.4 G/DL (ref 11.5–16)
HGB UR QL STRIP: NEGATIVE
HYALINE CASTS URNS QL MICRO: NORMAL /LPF (ref 0–5)
IMM GRANULOCYTES # BLD AUTO: 0 K/UL (ref 0–0.04)
IMM GRANULOCYTES NFR BLD AUTO: 0 % (ref 0–0.5)
INR PPP: 1 (ref 0.9–1.1)
KETONES UR QL STRIP.AUTO: NEGATIVE MG/DL
LEUKOCYTE ESTERASE UR QL STRIP.AUTO: NEGATIVE
LYMPHOCYTES # BLD: 2.5 K/UL (ref 0.8–3.5)
LYMPHOCYTES NFR BLD: 39 % (ref 12–49)
MCH RBC QN AUTO: 28.1 PG (ref 26–34)
MCHC RBC AUTO-ENTMCNC: 32.3 G/DL (ref 30–36.5)
MCV RBC AUTO: 86.9 FL (ref 80–99)
MONOCYTES # BLD: 0.5 K/UL (ref 0–1)
MONOCYTES NFR BLD: 8 % (ref 5–13)
NEUTS SEG # BLD: 3.3 K/UL (ref 1.8–8)
NEUTS SEG NFR BLD: 51 % (ref 32–75)
NITRITE UR QL STRIP.AUTO: NEGATIVE
NRBC # BLD: 0 K/UL (ref 0–0.01)
NRBC BLD-RTO: 0 PER 100 WBC
PH UR STRIP: 6 (ref 5–8)
PLATELET # BLD AUTO: 279 K/UL (ref 150–400)
PMV BLD AUTO: 11.2 FL (ref 8.9–12.9)
POTASSIUM SERPL-SCNC: 3.8 MMOL/L (ref 3.5–5.1)
PROT UR STRIP-MCNC: NEGATIVE MG/DL
PROTHROMBIN TIME: 10.7 SEC (ref 9–11.1)
RBC # BLD AUTO: 4.42 M/UL (ref 3.8–5.2)
RBC #/AREA URNS HPF: NORMAL /HPF (ref 0–5)
SODIUM SERPL-SCNC: 138 MMOL/L (ref 136–145)
SP GR UR REFRACTOMETRY: 1.01 (ref 1–1.03)
SPECIMEN EXP DATE BLD: NORMAL
URINE CULTURE IF INDICATED: NORMAL
UROBILINOGEN UR QL STRIP.AUTO: 0.2 EU/DL (ref 0.2–1)
WBC # BLD AUTO: 6.4 K/UL (ref 3.6–11)
WBC URNS QL MICRO: NORMAL /HPF (ref 0–4)

## 2024-02-14 PROCEDURE — 85025 COMPLETE CBC W/AUTO DIFF WBC: CPT

## 2024-02-14 PROCEDURE — 83036 HEMOGLOBIN GLYCOSYLATED A1C: CPT

## 2024-02-14 PROCEDURE — APPNB30 APP NON BILLABLE TIME 0-30 MINS: Performed by: NURSE PRACTITIONER

## 2024-02-14 PROCEDURE — 80048 BASIC METABOLIC PNL TOTAL CA: CPT

## 2024-02-14 PROCEDURE — 86850 RBC ANTIBODY SCREEN: CPT

## 2024-02-14 PROCEDURE — 85610 PROTHROMBIN TIME: CPT

## 2024-02-14 PROCEDURE — 86900 BLOOD TYPING SEROLOGIC ABO: CPT

## 2024-02-14 PROCEDURE — 36415 COLL VENOUS BLD VENIPUNCTURE: CPT

## 2024-02-14 PROCEDURE — 81001 URINALYSIS AUTO W/SCOPE: CPT

## 2024-02-14 PROCEDURE — 86901 BLOOD TYPING SEROLOGIC RH(D): CPT

## 2024-02-14 RX ORDER — SENNOSIDES A AND B 8.6 MG/1
1 TABLET, FILM COATED ORAL AS NEEDED
COMMUNITY

## 2024-02-14 RX ORDER — MULTIVIT-MIN/FERROUS GLUCONATE 9 MG/15 ML
15 LIQUID (ML) ORAL DAILY
COMMUNITY

## 2024-02-14 RX ORDER — BACLOFEN 20 MG/1
20 TABLET ORAL 3 TIMES DAILY PRN
COMMUNITY

## 2024-02-14 RX ORDER — METOPROLOL SUCCINATE 25 MG/1
25 TABLET, EXTENDED RELEASE ORAL DAILY
COMMUNITY

## 2024-02-14 NOTE — PERIOP NOTE
[facility] PREOPERATIVE INSTRUCTIONS  ORTHOPAEDIC    Surgery Date:   2-28-24    Your surgeon's office or [facility] staff will call you between 4 PM- 8 PM the day before surgery with your arrival time.  If your surgery is on a Monday, you will receive a call the preceding Friday. If your surgeon's office has given you, your arrival time then go by that time.    Please report to [facility] Patient Access/Admitting on the 1st floor.  Bring your insurance card, photo identification, and any copayment (if applicable).   If you are going home the same day of your surgery, you must have a responsible adult to drive you home. You need to have a responsible adult to stay with you the first 24 hours after surgery and you should not drive a car for 24 hours following your surgery.  If you are being admitted to the hospital,please leave personal belongings/luggage in your car until you have an assigned hospital room number.  Please wear comfortable clothes. Wear your glasses instead of contacts. We ask that all money, jewelry and valuables be left at home. Wear no make up, particularly mascara, the day of surgery.  Do NOT drink alcohol or smoke 24 hours before surgery. STOP smoking for 14 days prior as it helps with breathing and healing after surgery.   All body piercings, rings, and jewelry need to be removed and left at home. Do not wear any fingernail polish except for clear. Please wear your hair loose or down. Please no pony-tails, buns, or any metal hair accessories. You may wear deodorant. Do not shave any body area within 24 hours of your surgery.  Please follow all instructions to avoid any potential surgical cancellation.  Should your physical condition change, (i.e. fever, cold, flu, etc.) please notify your surgeon as soon as possible.  It is important to be on time. If a situation occurs where you may be delayed, please call:  [phone] / [phone] on the day of surgery.  The Preadmission

## 2024-02-15 LAB
BACTERIA SPEC CULT: NORMAL
BACTERIA SPEC CULT: NORMAL
SERVICE CMNT-IMP: NORMAL

## 2024-02-15 NOTE — PROGRESS NOTES
Lymphocytes Absolute 02/14/2024 2.5  0.8 - 3.5 K/UL Final    Monocytes Absolute 02/14/2024 0.5  0.0 - 1.0 K/UL Final    Eosinophils Absolute 02/14/2024 0.1  0.0 - 0.4 K/UL Final    Basophils Absolute 02/14/2024 0.0  0.0 - 0.1 K/UL Final    Absolute Immature Granulocyte 02/14/2024 0.0  0.00 - 0.04 K/UL Final    Differential Type 02/14/2024 AUTOMATED    Final    Sodium 02/14/2024 138  136 - 145 mmol/L Final    Potassium 02/14/2024 3.8  3.5 - 5.1 mmol/L Final    Chloride 02/14/2024 106  97 - 108 mmol/L Final    CO2 02/14/2024 27  21 - 32 mmol/L Final    Anion Gap 02/14/2024 5  5 - 15 mmol/L Final    Glucose 02/14/2024 102 (H)  65 - 100 mg/dL Final    BUN 02/14/2024 11  6 - 20 MG/DL Final    Creatinine 02/14/2024 0.85  0.55 - 1.02 MG/DL Final    Bun/Cre Ratio 02/14/2024 13  12 - 20   Final    Est, Glom Filt Rate 02/14/2024 >60  >60 ml/min/1.73m2 Final    Comment:    Pediatric calculator link: https://www.kidney.org/professionals/kdoqi/gfr_calculatorped     These results are not intended for use in patients <18 years of age.     eGFR results are calculated without a race factor using  the 2021 CKD-EPI equation. Careful clinical correlation is recommended, particularly when comparing to results calculated using previous equations.  The CKD-EPI equation is less accurate in patients with extremes of muscle mass, extra-renal metabolism of creatinine, excessive creatine ingestion, or following therapy that affects renal tubular secretion.      Calcium 02/14/2024 9.6  8.5 - 10.1 MG/DL Final    Special Requests 02/14/2024 NO SPECIAL REQUESTS    Final    Culture 02/14/2024 MRSA NOT PRESENT    Final    Culture 02/14/2024     Final                    Value:Screening of patient nares for MRSA is for surveillance purposes and, if positive, to facilitate isolation considerations in high risk settings. It is not intended for automatic decolonization interventions per se as regimens are not sufficiently effective to warrant routine

## 2024-02-16 PROBLEM — Z01.818 ENCOUNTER FOR PREADMISSION TESTING: Status: ACTIVE | Noted: 2024-02-16

## 2024-02-22 NOTE — H&P
Her unsteadiness in gait has improved.  However she still is lacking walking tolerance.  She cannot stand for long periods of time. She cannot walk long distances.  In terms of her neck she is doing well.        Imaging:     Xray Result (most recent):  XR LUMBAR SPINE (MIN 4 VIEWS) 01/11/2024     Narrative  AP and lateral flexion-extension of the lumbar spine reviewed today.  Mild spondylosis noted in the lower lumbar spine.  No fractures or lytic lesions.  No gross instability noted.      Update History & Physical    The patient's History and Physical of February 22, 2024 was reviewed with the patient and I examined the patient. There was no change. The surgical site was confirmed by the patient and me.       Plan: The risks, benefits, expected outcome, and alternative to the recommended procedure have been discussed with the patient. Patient understands and wants to proceed with the procedure.     Electronically signed by Jay Dangelo MD on 2/28/2024 at 7:18 AM         MRI Results (most recent):     MRI Result (most recent):     Narrative & Impression  INDICATION:  Low back pain      EXAMINATION:  MRI LUMBAR SPINE without CONTRAST     COMPARISON: None     TECHNIQUE: MR imaging of the lumbar spine was performed with sagittal T1, T2,  STIR;  axial T1, T2.  NO CONTRAST ADMINISTERED     FINDINGS:     No lumbosacral malalignment. Mild to moderate disc disease most pronounced at  L4-5. No evidence for acute fracture. No abnormality of the distal spinal cord.     T12-L1: No significant disc abnormality, central spinal canal stenosis, or  neural foraminal stenosis.      L1/2: No significant disc abnormality, central spinal canal stenosis, or neural  foraminal stenosis.      L2/3: Disc osteophyte complex eccentric to the left causing left subarticular  stenosis and mild to moderate left neural foraminal stenosis. Mild right neural  foraminal stenosis. Mild to moderate central stenosis.     L3/4: Disc osteophyte

## 2024-02-28 ENCOUNTER — APPOINTMENT (OUTPATIENT)
Facility: HOSPITAL | Age: 69
End: 2024-02-28
Attending: ORTHOPAEDIC SURGERY
Payer: MEDICARE

## 2024-02-28 ENCOUNTER — ANESTHESIA (OUTPATIENT)
Facility: HOSPITAL | Age: 69
End: 2024-02-28
Payer: MEDICARE

## 2024-02-28 ENCOUNTER — ANESTHESIA EVENT (OUTPATIENT)
Facility: HOSPITAL | Age: 69
End: 2024-02-28
Payer: MEDICARE

## 2024-02-28 ENCOUNTER — HOSPITAL ENCOUNTER (OUTPATIENT)
Facility: HOSPITAL | Age: 69
Setting detail: OBSERVATION
Discharge: HOME | End: 2024-02-29
Attending: ORTHOPAEDIC SURGERY | Admitting: ORTHOPAEDIC SURGERY
Payer: MEDICARE

## 2024-02-28 DIAGNOSIS — Z98.890 STATUS POST LUMBAR LAMINECTOMY: Primary | ICD-10-CM

## 2024-02-28 PROBLEM — M48.062 SPINAL STENOSIS OF LUMBAR REGION WITH NEUROGENIC CLAUDICATION: Status: ACTIVE | Noted: 2024-02-28

## 2024-02-28 PROCEDURE — 7100000001 HC PACU RECOVERY - ADDTL 15 MIN: Performed by: ORTHOPAEDIC SURGERY

## 2024-02-28 PROCEDURE — 51798 US URINE CAPACITY MEASURE: CPT

## 2024-02-28 PROCEDURE — 2500000003 HC RX 250 WO HCPCS: Performed by: ORTHOPAEDIC SURGERY

## 2024-02-28 PROCEDURE — 6360000002 HC RX W HCPCS: Performed by: PHYSICIAN ASSISTANT

## 2024-02-28 PROCEDURE — 7100000000 HC PACU RECOVERY - FIRST 15 MIN: Performed by: ORTHOPAEDIC SURGERY

## 2024-02-28 PROCEDURE — G0378 HOSPITAL OBSERVATION PER HR: HCPCS

## 2024-02-28 PROCEDURE — 6370000000 HC RX 637 (ALT 250 FOR IP): Performed by: PHYSICIAN ASSISTANT

## 2024-02-28 PROCEDURE — 3600000014 HC SURGERY LEVEL 4 ADDTL 15MIN: Performed by: ORTHOPAEDIC SURGERY

## 2024-02-28 PROCEDURE — 2500000003 HC RX 250 WO HCPCS: Performed by: NURSE ANESTHETIST, CERTIFIED REGISTERED

## 2024-02-28 PROCEDURE — 2580000003 HC RX 258: Performed by: ANESTHESIOLOGY

## 2024-02-28 PROCEDURE — 3600000004 HC SURGERY LEVEL 4 BASE: Performed by: ORTHOPAEDIC SURGERY

## 2024-02-28 PROCEDURE — 3700000001 HC ADD 15 MINUTES (ANESTHESIA): Performed by: ORTHOPAEDIC SURGERY

## 2024-02-28 PROCEDURE — 6360000002 HC RX W HCPCS: Performed by: ANESTHESIOLOGY

## 2024-02-28 PROCEDURE — 3700000000 HC ANESTHESIA ATTENDED CARE: Performed by: ORTHOPAEDIC SURGERY

## 2024-02-28 PROCEDURE — 6370000000 HC RX 637 (ALT 250 FOR IP): Performed by: ORTHOPAEDIC SURGERY

## 2024-02-28 PROCEDURE — 2709999900 HC NON-CHARGEABLE SUPPLY: Performed by: ORTHOPAEDIC SURGERY

## 2024-02-28 PROCEDURE — 6360000002 HC RX W HCPCS

## 2024-02-28 PROCEDURE — 2580000003 HC RX 258: Performed by: PHYSICIAN ASSISTANT

## 2024-02-28 PROCEDURE — 6370000000 HC RX 637 (ALT 250 FOR IP): Performed by: ANESTHESIOLOGY

## 2024-02-28 PROCEDURE — 6360000002 HC RX W HCPCS: Performed by: NURSE ANESTHETIST, CERTIFIED REGISTERED

## 2024-02-28 DEVICE — GRAFT HUM TISS 3X4 CM WND COVERING AMNIO MEMBRN VERSASHIELD: Type: IMPLANTABLE DEVICE | Site: BACK | Status: FUNCTIONAL

## 2024-02-28 RX ORDER — ONDANSETRON 2 MG/ML
INJECTION INTRAMUSCULAR; INTRAVENOUS PRN
Status: DISCONTINUED | OUTPATIENT
Start: 2024-02-28 | End: 2024-02-28 | Stop reason: SDUPTHER

## 2024-02-28 RX ORDER — FENTANYL CITRATE 50 UG/ML
100 INJECTION, SOLUTION INTRAMUSCULAR; INTRAVENOUS
Status: DISCONTINUED | OUTPATIENT
Start: 2024-02-28 | End: 2024-02-28 | Stop reason: HOSPADM

## 2024-02-28 RX ORDER — KETOROLAC TROMETHAMINE 30 MG/ML
15 INJECTION, SOLUTION INTRAMUSCULAR; INTRAVENOUS EVERY 6 HOURS PRN
Status: DISCONTINUED | OUTPATIENT
Start: 2024-02-28 | End: 2024-02-29 | Stop reason: HOSPADM

## 2024-02-28 RX ORDER — FENTANYL CITRATE 50 UG/ML
25 INJECTION, SOLUTION INTRAMUSCULAR; INTRAVENOUS EVERY 5 MIN PRN
Status: DISCONTINUED | OUTPATIENT
Start: 2024-02-28 | End: 2024-02-28 | Stop reason: HOSPADM

## 2024-02-28 RX ORDER — ONDANSETRON 2 MG/ML
4 INJECTION INTRAMUSCULAR; INTRAVENOUS
Status: DISCONTINUED | OUTPATIENT
Start: 2024-02-28 | End: 2024-02-28 | Stop reason: HOSPADM

## 2024-02-28 RX ORDER — BISACODYL 10 MG
10 SUPPOSITORY, RECTAL RECTAL DAILY PRN
Status: DISCONTINUED | OUTPATIENT
Start: 2024-02-28 | End: 2024-02-29 | Stop reason: HOSPADM

## 2024-02-28 RX ORDER — SENNA AND DOCUSATE SODIUM 50; 8.6 MG/1; MG/1
1 TABLET, FILM COATED ORAL 2 TIMES DAILY
Status: DISCONTINUED | OUTPATIENT
Start: 2024-02-28 | End: 2024-02-29 | Stop reason: HOSPADM

## 2024-02-28 RX ORDER — SODIUM CHLORIDE 0.9 % (FLUSH) 0.9 %
5-40 SYRINGE (ML) INJECTION EVERY 12 HOURS SCHEDULED
Status: DISCONTINUED | OUTPATIENT
Start: 2024-02-28 | End: 2024-02-29 | Stop reason: HOSPADM

## 2024-02-28 RX ORDER — HYDRALAZINE HYDROCHLORIDE 20 MG/ML
10 INJECTION INTRAMUSCULAR; INTRAVENOUS
Status: DISCONTINUED | OUTPATIENT
Start: 2024-02-28 | End: 2024-02-28 | Stop reason: HOSPADM

## 2024-02-28 RX ORDER — PROCHLORPERAZINE EDISYLATE 5 MG/ML
5 INJECTION INTRAMUSCULAR; INTRAVENOUS EVERY 6 HOURS PRN
Status: DISCONTINUED | OUTPATIENT
Start: 2024-02-28 | End: 2024-02-29 | Stop reason: HOSPADM

## 2024-02-28 RX ORDER — DEXAMETHASONE SODIUM PHOSPHATE 4 MG/ML
INJECTION, SOLUTION INTRA-ARTICULAR; INTRALESIONAL; INTRAMUSCULAR; INTRAVENOUS; SOFT TISSUE PRN
Status: DISCONTINUED | OUTPATIENT
Start: 2024-02-28 | End: 2024-02-28 | Stop reason: SDUPTHER

## 2024-02-28 RX ORDER — SUCCINYLCHOLINE/SOD CL,ISO/PF 200MG/10ML
SYRINGE (ML) INTRAVENOUS PRN
Status: DISCONTINUED | OUTPATIENT
Start: 2024-02-28 | End: 2024-02-28 | Stop reason: SDUPTHER

## 2024-02-28 RX ORDER — ATORVASTATIN CALCIUM 20 MG/1
20 TABLET, FILM COATED ORAL DAILY
Status: DISCONTINUED | OUTPATIENT
Start: 2024-02-29 | End: 2024-02-29 | Stop reason: HOSPADM

## 2024-02-28 RX ORDER — PROPOFOL 10 MG/ML
INJECTION, EMULSION INTRAVENOUS PRN
Status: DISCONTINUED | OUTPATIENT
Start: 2024-02-28 | End: 2024-02-28 | Stop reason: SDUPTHER

## 2024-02-28 RX ORDER — SODIUM CHLORIDE 0.9 % (FLUSH) 0.9 %
5-40 SYRINGE (ML) INJECTION PRN
Status: DISCONTINUED | OUTPATIENT
Start: 2024-02-28 | End: 2024-02-28 | Stop reason: HOSPADM

## 2024-02-28 RX ORDER — POLYETHYLENE GLYCOL 3350 17 G/17G
17 POWDER, FOR SOLUTION ORAL DAILY
Status: DISCONTINUED | OUTPATIENT
Start: 2024-02-28 | End: 2024-02-29 | Stop reason: HOSPADM

## 2024-02-28 RX ORDER — HYDROMORPHONE HYDROCHLORIDE 1 MG/ML
0.5 INJECTION, SOLUTION INTRAMUSCULAR; INTRAVENOUS; SUBCUTANEOUS
Status: DISCONTINUED | OUTPATIENT
Start: 2024-02-28 | End: 2024-02-29 | Stop reason: HOSPADM

## 2024-02-28 RX ORDER — SODIUM CHLORIDE 9 MG/ML
INJECTION, SOLUTION INTRAVENOUS CONTINUOUS
Status: DISCONTINUED | OUTPATIENT
Start: 2024-02-28 | End: 2024-02-29 | Stop reason: HOSPADM

## 2024-02-28 RX ORDER — FENTANYL CITRATE 50 UG/ML
INJECTION, SOLUTION INTRAMUSCULAR; INTRAVENOUS
Status: COMPLETED
Start: 2024-02-28 | End: 2024-02-28

## 2024-02-28 RX ORDER — ONDANSETRON 2 MG/ML
4 INJECTION INTRAMUSCULAR; INTRAVENOUS EVERY 6 HOURS PRN
Status: DISCONTINUED | OUTPATIENT
Start: 2024-02-28 | End: 2024-02-29 | Stop reason: HOSPADM

## 2024-02-28 RX ORDER — ROCURONIUM BROMIDE 10 MG/ML
INJECTION, SOLUTION INTRAVENOUS PRN
Status: DISCONTINUED | OUTPATIENT
Start: 2024-02-28 | End: 2024-02-28 | Stop reason: SDUPTHER

## 2024-02-28 RX ORDER — PROCHLORPERAZINE EDISYLATE 5 MG/ML
5 INJECTION INTRAMUSCULAR; INTRAVENOUS
Status: DISCONTINUED | OUTPATIENT
Start: 2024-02-28 | End: 2024-02-28 | Stop reason: HOSPADM

## 2024-02-28 RX ORDER — LIDOCAINE HYDROCHLORIDE 10 MG/ML
1 INJECTION, SOLUTION EPIDURAL; INFILTRATION; INTRACAUDAL; PERINEURAL
Status: DISCONTINUED | OUTPATIENT
Start: 2024-02-28 | End: 2024-02-28 | Stop reason: HOSPADM

## 2024-02-28 RX ORDER — SODIUM CHLORIDE 0.9 % (FLUSH) 0.9 %
5-40 SYRINGE (ML) INJECTION EVERY 12 HOURS SCHEDULED
Status: DISCONTINUED | OUTPATIENT
Start: 2024-02-28 | End: 2024-02-28 | Stop reason: HOSPADM

## 2024-02-28 RX ORDER — SODIUM CHLORIDE 9 MG/ML
INJECTION, SOLUTION INTRAVENOUS PRN
Status: DISCONTINUED | OUTPATIENT
Start: 2024-02-28 | End: 2024-02-28 | Stop reason: HOSPADM

## 2024-02-28 RX ORDER — FAMOTIDINE 20 MG/1
20 TABLET, FILM COATED ORAL 2 TIMES DAILY
Status: DISCONTINUED | OUTPATIENT
Start: 2024-02-28 | End: 2024-02-29 | Stop reason: HOSPADM

## 2024-02-28 RX ORDER — LIDOCAINE HYDROCHLORIDE 20 MG/ML
INJECTION, SOLUTION EPIDURAL; INFILTRATION; INTRACAUDAL; PERINEURAL PRN
Status: DISCONTINUED | OUTPATIENT
Start: 2024-02-28 | End: 2024-02-28 | Stop reason: SDUPTHER

## 2024-02-28 RX ORDER — NEOSTIGMINE METHYLSULFATE 1 MG/ML
INJECTION, SOLUTION INTRAVENOUS PRN
Status: DISCONTINUED | OUTPATIENT
Start: 2024-02-28 | End: 2024-02-28 | Stop reason: SDUPTHER

## 2024-02-28 RX ORDER — ACETAMINOPHEN 500 MG
1000 TABLET ORAL ONCE
Status: COMPLETED | OUTPATIENT
Start: 2024-02-28 | End: 2024-02-28

## 2024-02-28 RX ORDER — SODIUM CHLORIDE 0.9 % (FLUSH) 0.9 %
5-40 SYRINGE (ML) INJECTION PRN
Status: DISCONTINUED | OUTPATIENT
Start: 2024-02-28 | End: 2024-02-29 | Stop reason: HOSPADM

## 2024-02-28 RX ORDER — SCOLOPAMINE TRANSDERMAL SYSTEM 1 MG/1
1 PATCH, EXTENDED RELEASE TRANSDERMAL
Status: DISCONTINUED | OUTPATIENT
Start: 2024-02-28 | End: 2024-02-28 | Stop reason: HOSPADM

## 2024-02-28 RX ORDER — MIDAZOLAM HYDROCHLORIDE 1 MG/ML
INJECTION INTRAMUSCULAR; INTRAVENOUS PRN
Status: DISCONTINUED | OUTPATIENT
Start: 2024-02-28 | End: 2024-02-28 | Stop reason: SDUPTHER

## 2024-02-28 RX ORDER — GLYCOPYRROLATE 0.2 MG/ML
INJECTION, SOLUTION INTRAMUSCULAR; INTRAVENOUS PRN
Status: DISCONTINUED | OUTPATIENT
Start: 2024-02-28 | End: 2024-02-28 | Stop reason: SDUPTHER

## 2024-02-28 RX ORDER — OXYCODONE HYDROCHLORIDE 5 MG/1
5 TABLET ORAL EVERY 4 HOURS PRN
Qty: 40 TABLET | Refills: 0 | Status: SHIPPED | OUTPATIENT
Start: 2024-02-28 | End: 2024-03-06

## 2024-02-28 RX ORDER — DEXMEDETOMIDINE HYDROCHLORIDE 100 UG/ML
INJECTION, SOLUTION INTRAVENOUS PRN
Status: DISCONTINUED | OUTPATIENT
Start: 2024-02-28 | End: 2024-02-28 | Stop reason: SDUPTHER

## 2024-02-28 RX ORDER — HYDROXYZINE HYDROCHLORIDE 10 MG/1
10 TABLET, FILM COATED ORAL EVERY 8 HOURS PRN
Status: DISCONTINUED | OUTPATIENT
Start: 2024-02-28 | End: 2024-02-29 | Stop reason: HOSPADM

## 2024-02-28 RX ORDER — SODIUM CHLORIDE, SODIUM LACTATE, POTASSIUM CHLORIDE, CALCIUM CHLORIDE 600; 310; 30; 20 MG/100ML; MG/100ML; MG/100ML; MG/100ML
INJECTION, SOLUTION INTRAVENOUS CONTINUOUS
Status: DISCONTINUED | OUTPATIENT
Start: 2024-02-28 | End: 2024-02-28 | Stop reason: HOSPADM

## 2024-02-28 RX ORDER — ACETAMINOPHEN 325 MG/1
650 TABLET ORAL EVERY 6 HOURS
Status: DISCONTINUED | OUTPATIENT
Start: 2024-02-28 | End: 2024-02-29 | Stop reason: HOSPADM

## 2024-02-28 RX ORDER — CEFAZOLIN SODIUM 1 G/3ML
INJECTION, POWDER, FOR SOLUTION INTRAMUSCULAR; INTRAVENOUS PRN
Status: DISCONTINUED | OUTPATIENT
Start: 2024-02-28 | End: 2024-02-28 | Stop reason: SDUPTHER

## 2024-02-28 RX ORDER — MIDAZOLAM HYDROCHLORIDE 2 MG/2ML
2 INJECTION, SOLUTION INTRAMUSCULAR; INTRAVENOUS
Status: DISCONTINUED | OUTPATIENT
Start: 2024-02-28 | End: 2024-02-28 | Stop reason: HOSPADM

## 2024-02-28 RX ORDER — BACLOFEN 10 MG/1
20 TABLET ORAL 3 TIMES DAILY PRN
Status: DISCONTINUED | OUTPATIENT
Start: 2024-02-28 | End: 2024-02-29 | Stop reason: HOSPADM

## 2024-02-28 RX ORDER — AMLODIPINE BESYLATE 5 MG/1
10 TABLET ORAL DAILY
Status: DISCONTINUED | OUTPATIENT
Start: 2024-02-29 | End: 2024-02-29 | Stop reason: HOSPADM

## 2024-02-28 RX ORDER — OXYCODONE HYDROCHLORIDE 5 MG/1
5 TABLET ORAL
Status: DISCONTINUED | OUTPATIENT
Start: 2024-02-28 | End: 2024-02-28 | Stop reason: HOSPADM

## 2024-02-28 RX ORDER — ONDANSETRON 4 MG/1
4 TABLET, ORALLY DISINTEGRATING ORAL EVERY 8 HOURS PRN
Status: DISCONTINUED | OUTPATIENT
Start: 2024-02-28 | End: 2024-02-29 | Stop reason: HOSPADM

## 2024-02-28 RX ORDER — HYDROMORPHONE HYDROCHLORIDE 1 MG/ML
0.5 INJECTION, SOLUTION INTRAMUSCULAR; INTRAVENOUS; SUBCUTANEOUS EVERY 5 MIN PRN
Status: COMPLETED | OUTPATIENT
Start: 2024-02-28 | End: 2024-02-28

## 2024-02-28 RX ORDER — MAGNESIUM HYDROXIDE/ALUMINUM HYDROXICE/SIMETHICONE 120; 1200; 1200 MG/30ML; MG/30ML; MG/30ML
15 SUSPENSION ORAL EVERY 6 HOURS PRN
Status: DISCONTINUED | OUTPATIENT
Start: 2024-02-28 | End: 2024-02-29 | Stop reason: HOSPADM

## 2024-02-28 RX ORDER — SODIUM CHLORIDE 9 MG/ML
INJECTION, SOLUTION INTRAVENOUS PRN
Status: DISCONTINUED | OUTPATIENT
Start: 2024-02-28 | End: 2024-02-29 | Stop reason: HOSPADM

## 2024-02-28 RX ORDER — OXYCODONE HYDROCHLORIDE 5 MG/1
10 TABLET ORAL EVERY 4 HOURS PRN
Status: DISCONTINUED | OUTPATIENT
Start: 2024-02-28 | End: 2024-02-29 | Stop reason: HOSPADM

## 2024-02-28 RX ORDER — OXYCODONE HYDROCHLORIDE 5 MG/1
5 TABLET ORAL EVERY 4 HOURS PRN
Status: DISCONTINUED | OUTPATIENT
Start: 2024-02-28 | End: 2024-02-29 | Stop reason: HOSPADM

## 2024-02-28 RX ORDER — METOPROLOL SUCCINATE 25 MG/1
25 TABLET, EXTENDED RELEASE ORAL DAILY
Status: DISCONTINUED | OUTPATIENT
Start: 2024-02-29 | End: 2024-02-29 | Stop reason: HOSPADM

## 2024-02-28 RX ORDER — FENTANYL CITRATE 50 UG/ML
INJECTION, SOLUTION INTRAMUSCULAR; INTRAVENOUS PRN
Status: DISCONTINUED | OUTPATIENT
Start: 2024-02-28 | End: 2024-02-28 | Stop reason: SDUPTHER

## 2024-02-28 RX ADMIN — ROCURONIUM BROMIDE 30 MG: 10 INJECTION, SOLUTION INTRAVENOUS at 09:26

## 2024-02-28 RX ADMIN — PROCHLORPERAZINE EDISYLATE 5 MG: 5 INJECTION INTRAMUSCULAR; INTRAVENOUS at 15:43

## 2024-02-28 RX ADMIN — SENNOSIDES AND DOCUSATE SODIUM 1 TABLET: 8.6; 5 TABLET ORAL at 22:05

## 2024-02-28 RX ADMIN — MIDAZOLAM 2 MG: 1 INJECTION INTRAMUSCULAR; INTRAVENOUS at 09:12

## 2024-02-28 RX ADMIN — LIDOCAINE HYDROCHLORIDE 50 MG: 20 INJECTION, SOLUTION EPIDURAL; INFILTRATION; INTRACAUDAL; PERINEURAL at 09:19

## 2024-02-28 RX ADMIN — DEXMEDETOMIDINE 10 MCG: 100 INJECTION, SOLUTION INTRAVENOUS at 09:15

## 2024-02-28 RX ADMIN — SENNOSIDES AND DOCUSATE SODIUM 1 TABLET: 8.6; 5 TABLET ORAL at 14:27

## 2024-02-28 RX ADMIN — Medication 100 MG: at 09:19

## 2024-02-28 RX ADMIN — POLYETHYLENE GLYCOL 3350 17 G: 17 POWDER, FOR SOLUTION ORAL at 14:27

## 2024-02-28 RX ADMIN — FAMOTIDINE 20 MG: 20 TABLET ORAL at 14:27

## 2024-02-28 RX ADMIN — ACETAMINOPHEN 650 MG: 325 TABLET ORAL at 14:27

## 2024-02-28 RX ADMIN — SODIUM CHLORIDE, PRESERVATIVE FREE 10 ML: 5 INJECTION INTRAVENOUS at 22:06

## 2024-02-28 RX ADMIN — KETOROLAC TROMETHAMINE 15 MG: 30 INJECTION INTRAMUSCULAR; INTRAVENOUS at 11:45

## 2024-02-28 RX ADMIN — FENTANYL CITRATE 25 MCG: 50 INJECTION, SOLUTION INTRAMUSCULAR; INTRAVENOUS at 11:55

## 2024-02-28 RX ADMIN — PROPOFOL 25 MG: 10 INJECTION, EMULSION INTRAVENOUS at 10:33

## 2024-02-28 RX ADMIN — SODIUM CHLORIDE: 9 INJECTION, SOLUTION INTRAVENOUS at 13:35

## 2024-02-28 RX ADMIN — DEXAMETHASONE SODIUM PHOSPHATE 4 MG: 4 INJECTION INTRA-ARTICULAR; INTRALESIONAL; INTRAMUSCULAR; INTRAVENOUS; SOFT TISSUE at 09:25

## 2024-02-28 RX ADMIN — OXYCODONE 5 MG: 5 TABLET ORAL at 18:51

## 2024-02-28 RX ADMIN — PROPOFOL 25 MG: 10 INJECTION, EMULSION INTRAVENOUS at 10:37

## 2024-02-28 RX ADMIN — CEFAZOLIN 2 G: 330 INJECTION, POWDER, FOR SOLUTION INTRAMUSCULAR; INTRAVENOUS at 09:35

## 2024-02-28 RX ADMIN — PROPOFOL 25 MG: 10 INJECTION, EMULSION INTRAVENOUS at 10:29

## 2024-02-28 RX ADMIN — ACETAMINOPHEN 1000 MG: 500 TABLET ORAL at 07:36

## 2024-02-28 RX ADMIN — NEOSTIGMINE METHYLSULFATE 3 MG: 1 INJECTION, SOLUTION INTRAVENOUS at 10:32

## 2024-02-28 RX ADMIN — ROCURONIUM BROMIDE 10 MG: 10 INJECTION, SOLUTION INTRAVENOUS at 09:19

## 2024-02-28 RX ADMIN — PROPOFOL 25 MG: 10 INJECTION, EMULSION INTRAVENOUS at 10:35

## 2024-02-28 RX ADMIN — FAMOTIDINE 20 MG: 20 TABLET ORAL at 22:05

## 2024-02-28 RX ADMIN — ONDANSETRON 4 MG: 2 INJECTION INTRAMUSCULAR; INTRAVENOUS at 09:25

## 2024-02-28 RX ADMIN — HYDROMORPHONE HYDROCHLORIDE 0.5 MG: 1 INJECTION, SOLUTION INTRAMUSCULAR; INTRAVENOUS; SUBCUTANEOUS at 12:00

## 2024-02-28 RX ADMIN — PROPOFOL 25 MG: 10 INJECTION, EMULSION INTRAVENOUS at 10:39

## 2024-02-28 RX ADMIN — PROPOFOL 120 MG: 10 INJECTION, EMULSION INTRAVENOUS at 09:19

## 2024-02-28 RX ADMIN — GLYCOPYRROLATE 0.4 MG: 0.2 INJECTION, SOLUTION INTRAMUSCULAR; INTRAVENOUS at 10:32

## 2024-02-28 RX ADMIN — FENTANYL CITRATE 25 MCG: 50 INJECTION INTRAMUSCULAR; INTRAVENOUS at 11:55

## 2024-02-28 RX ADMIN — ACETAMINOPHEN 650 MG: 325 TABLET ORAL at 18:51

## 2024-02-28 RX ADMIN — FENTANYL CITRATE 50 MCG: 50 INJECTION, SOLUTION INTRAMUSCULAR; INTRAVENOUS at 09:19

## 2024-02-28 RX ADMIN — HYDROMORPHONE HYDROCHLORIDE 0.5 MG: 1 INJECTION, SOLUTION INTRAMUSCULAR; INTRAVENOUS; SUBCUTANEOUS at 11:45

## 2024-02-28 RX ADMIN — DEXMEDETOMIDINE 10 MCG: 100 INJECTION, SOLUTION INTRAVENOUS at 09:12

## 2024-02-28 RX ADMIN — SODIUM CHLORIDE: 9 INJECTION, SOLUTION INTRAVENOUS at 22:15

## 2024-02-28 RX ADMIN — SODIUM CHLORIDE: 9 INJECTION, SOLUTION INTRAVENOUS at 13:43

## 2024-02-28 RX ADMIN — PSYLLIUM HUSK 1 PACKET: 3.4 POWDER ORAL at 22:06

## 2024-02-28 RX ADMIN — WATER 2000 MG: 1 INJECTION INTRAMUSCULAR; INTRAVENOUS; SUBCUTANEOUS at 17:07

## 2024-02-28 RX ADMIN — ONDANSETRON 4 MG: 2 INJECTION INTRAMUSCULAR; INTRAVENOUS at 18:51

## 2024-02-28 RX ADMIN — SODIUM CHLORIDE: 9 INJECTION, SOLUTION INTRAVENOUS at 12:30

## 2024-02-28 RX ADMIN — FENTANYL CITRATE 50 MCG: 50 INJECTION, SOLUTION INTRAMUSCULAR; INTRAVENOUS at 10:32

## 2024-02-28 RX ADMIN — PROPOFOL 25 MG: 10 INJECTION, EMULSION INTRAVENOUS at 10:31

## 2024-02-28 RX ADMIN — OXYCODONE 5 MG: 5 TABLET ORAL at 14:49

## 2024-02-28 RX ADMIN — HYDROMORPHONE HYDROCHLORIDE 1 MG: 1 INJECTION, SOLUTION INTRAMUSCULAR; INTRAVENOUS; SUBCUTANEOUS at 09:55

## 2024-02-28 RX ADMIN — SODIUM CHLORIDE, POTASSIUM CHLORIDE, SODIUM LACTATE AND CALCIUM CHLORIDE: 600; 310; 30; 20 INJECTION, SOLUTION INTRAVENOUS at 07:47

## 2024-02-28 ASSESSMENT — PAIN DESCRIPTION - DESCRIPTORS
DESCRIPTORS: ACHING
DESCRIPTORS: ACHING

## 2024-02-28 ASSESSMENT — PAIN DESCRIPTION - LOCATION
LOCATION: BACK

## 2024-02-28 ASSESSMENT — PAIN DESCRIPTION - ORIENTATION
ORIENTATION: LOWER

## 2024-02-28 ASSESSMENT — PAIN SCALES - WONG BAKER
WONGBAKER_NUMERICALRESPONSE: 0
WONGBAKER_NUMERICALRESPONSE: 8
WONGBAKER_NUMERICALRESPONSE: 4

## 2024-02-28 ASSESSMENT — PAIN SCALES - GENERAL
PAINLEVEL_OUTOF10: 4
PAINLEVEL_OUTOF10: 7
PAINLEVEL_OUTOF10: 2
PAINLEVEL_OUTOF10: 6
PAINLEVEL_OUTOF10: 6
PAINLEVEL_OUTOF10: 8

## 2024-02-28 ASSESSMENT — PAIN - FUNCTIONAL ASSESSMENT: PAIN_FUNCTIONAL_ASSESSMENT: 0-10

## 2024-02-28 NOTE — ANESTHESIA POSTPROCEDURE EVALUATION
Department of Anesthesiology  Postprocedure Note    Patient: Tonya Brantley  MRN: [id number]  YOB: 1955  Date of evaluation: 2/28/2024    Procedure Summary       Date: 02/28/24 Room / Location: [facility] MAIN OR [address] [place] [address] MAIN OR    Anesthesia Start: 0912 Anesthesia Stop: 1105    Procedure: L2-L5 LUMBAR LAMINECTOMY (Back) Diagnosis:       Spinal stenosis, lumbar region, with neurogenic claudication      Low back pain      (Spinal stenosis, lumbar region, with neurogenic claudication [M48.062])      (Low back pain [M54.50])    Providers: Jay Dangelo MD Responsible Provider: Cliff Barron MD    Anesthesia Type: General ASA Status: 2            Anesthesia Type: General    Raymond Phase I: Raymond Score: 10    Raymond Phase II:      Anesthesia Post Evaluation    Patient location during evaluation: PACU  Patient participation: complete - patient participated  Level of consciousness: awake  Airway patency: patent  Nausea & Vomiting: no nausea  Cardiovascular status: blood pressure returned to baseline and hemodynamically stable  Respiratory status: acceptable  Hydration status: stable  Multimodal analgesia pain management approach    No notable events documented.

## 2024-02-28 NOTE — PERIOP NOTE
TRANSFER - OUT REPORT:    Verbal report given to Pat BECKETT on Tonya Brantley  being transferred to room 535 for routine post-op       Report consisted of patient’s Situation, Background, Assessment and   Recommendations(SBAR).     Time Pre op antibiotic given:0935  Anesthesia Stop time: 1105    Information from the following report(s) SBAR and MAR was reviewed with the receiving nurse.    Opportunity for questions and clarification was provided.     Is the patient on 02? Yes       L/Min 2       Other     Perdomo Present on Transfer to floor: No  Order for Perdomo on Chart: n/a    Is the patient on a monitor? No    Is the nurse transporting with the patient? No    Surgical Waiting Area notified of patient's transfer from PACU? Yes    Lines:   Peripheral IV 02/28/24 Left Hand (Active)   Site Assessment Clean, dry & intact 02/28/24 1230   Line Status Infusing 02/28/24 1230   Phlebitis Assessment No symptoms 02/28/24 1230   Infiltration Assessment 0 02/28/24 1230   Dressing Status Clean, dry & intact 02/28/24 1230   Dressing Type Transparent 02/28/24 1230        The following personal items collected during your admission accompanied patient upon transfer:   Dental Appliance:    Vision:    Hearing Aid:    Jewelry:    Clothing:    Other Valuables:    Valuables sent to safe:     VS stable. Resting comfortably. Patient denies nausea. Pain improved. No signs of excessive bleeding. Ready to transfer from PACU.

## 2024-02-28 NOTE — DISCHARGE INSTRUCTIONS
NOT:  -Do not give your medicine to others   -Do not take medicine unless it was prescribed for you   -Do not stop taking your medicine without talking to your healthcare provider   -Do not break, chew, crush, dissolve, or inject your medicine. If you cannot  swallow your medicine whole, talk to your healthcare provider.  -Do not drink alcohol while taking this medicine  -Do not take anti-inflammatory medications or aspirin unless instructed by your physician.

## 2024-02-28 NOTE — BRIEF OP NOTE
Brief Postoperative Note      Patient: Tonya Brantley  YOB: 1955  MRN: [id number]    Date of Procedure: 2/28/2024    Pre-Op Diagnosis Codes:     * Spinal stenosis, lumbar region, with neurogenic claudication [M48.062]     * Low back pain [M54.50]    Post-Op Diagnosis: Same       Procedure(s):  L2-L5 LUMBAR LAMINECTOMY    Surgeon(s):  Jay Dangelo MD    Assistant:  Physician Assistant: Mick Pope PA-C    Anesthesia: General    Estimated Blood Loss (mL): less than 50 ml    Complications: None    Specimens:   * No specimens in log *    Implants:  Implant Name Type Inv. Item Serial No.  Lot No. LRB No. Used Action   GRAFT HUM TISS 3X4 CM WND COVERING AMNIO [employer] VERSASH[employer]ELD - [id number]  GRAFT HUM TISS 3X4 CM WND COVERING AMNIO [employer]N VERSASHIELD [id number] [facility] TRANSPLANT [employer]- N/A N/A 1 Implanted   GRAFT HUM TISS 3X4 CM WND COVERING AMNIO [employer] - [id number]  GRAFT HUM TISS 3X4 CM WND COVERING AMNIO [employer] [id number] [facility] TRANSPLANT [employer]- N/A N/A 1 Implanted         Drains: * No LDAs found *    Findings: Stenosis.       Electronically signed by Mick Pope PA-C on 2/28/2024 at 10:48 AM

## 2024-02-28 NOTE — ANESTHESIA PRE PROCEDURE
Department of Anesthesiology  Preprocedure Note       Name:  Tonya Brantley   Age:  69 y.o.  :  1955                                          MRN:  [id number]         Date:  2024      Surgeon: Surgeon(s):  Jay Dangelo MD    Procedure: Procedure(s):  L2-L5 LUMBAR LAMINECTOMY    Medications prior to admission:   Prior to Admission medications    Medication Sig Start Date End Date Taking? Authorizing Provider   baclofen (LIORESAL) 20 MG tablet Take 1 tablet by mouth 3 times daily as needed    Elliot Hall MD   metoprolol succinate (TOPROL XL) 25 MG extended release tablet Take 1 tablet by mouth daily    Elliot Hall MD   senna (SENOKOT) 8.6 MG tablet Take 1 tablet by mouth as needed for Constipation    Elliot Hall MD   METAMUCIL FIBER PO Take 2 tablets by mouth daily    Elliot Hall MD   Multiple Vitamins-Minerals (CENTRUM/CERTA-EVELINA WITH MINERALS ORAL) solution Take 15 mLs by mouth daily    Elliot Hall MD   acetaminophen (TYLENOL) 500 MG tablet Take 1 tablet by mouth every 6 hours as needed 22   Automatic Reconciliation, Ar   amLODIPine (NORVASC) 10 MG tablet Take 1 tablet by mouth daily    Automatic Reconciliation, Ar   atorvastatin (LIPITOR) 20 MG tablet Take 1 tablet by mouth daily 22   Automatic Reconciliation, Ar       Current medications:    Current Facility-Administered Medications   Medication Dose Route Frequency Provider Last Rate Last Admin   • lidocaine PF 1 % injection 1 mL  1 mL IntraDERmal Once PRN Opal Medellin DO       • fentaNYL (SUBLIMAZE) injection 100 mcg  100 mcg IntraVENous Once PRN Opal Medellin DO       • lactated ringers IV soln infusion   IntraVENous Continuous Opal Medellin  mL/hr at 24 0747 New Bag at 24 0747   • sodium chloride flush 0.9 % injection 5-40 mL  5-40 mL IntraVENous 2 times per day Opal Medellin DO       • sodium chloride flush 0.9 % injection 5-40 mL  5-40 mL

## 2024-02-29 VITALS
DIASTOLIC BLOOD PRESSURE: 63 MMHG | TEMPERATURE: 98.6 F | OXYGEN SATURATION: 98 % | RESPIRATION RATE: 18 BRPM | SYSTOLIC BLOOD PRESSURE: 120 MMHG | HEART RATE: 57 BPM

## 2024-02-29 LAB
ANION GAP SERPL CALC-SCNC: 8 MMOL/L (ref 5–15)
BUN SERPL-MCNC: 12 MG/DL (ref 6–20)
BUN/CREAT SERPL: 13 (ref 12–20)
CALCIUM SERPL-MCNC: 9.2 MG/DL (ref 8.5–10.1)
CHLORIDE SERPL-SCNC: 108 MMOL/L (ref 97–108)
CO2 SERPL-SCNC: 24 MMOL/L (ref 21–32)
CREAT SERPL-MCNC: 0.91 MG/DL (ref 0.55–1.02)
ERYTHROCYTE [DISTWIDTH] IN BLOOD BY AUTOMATED COUNT: 12.5 % (ref 11.5–14.5)
GLUCOSE SERPL-MCNC: 115 MG/DL (ref 65–100)
HCT VFR BLD AUTO: 33.9 % (ref 35–47)
HGB BLD-MCNC: 11.2 G/DL (ref 11.5–16)
MCH RBC QN AUTO: 28.8 PG (ref 26–34)
MCHC RBC AUTO-ENTMCNC: 33 G/DL (ref 30–36.5)
MCV RBC AUTO: 87.1 FL (ref 80–99)
NRBC # BLD: 0 K/UL (ref 0–0.01)
NRBC BLD-RTO: 0 PER 100 WBC
PLATELET # BLD AUTO: 242 K/UL (ref 150–400)
PMV BLD AUTO: 10.6 FL (ref 8.9–12.9)
POTASSIUM SERPL-SCNC: 4.2 MMOL/L (ref 3.5–5.1)
RBC # BLD AUTO: 3.89 M/UL (ref 3.8–5.2)
SODIUM SERPL-SCNC: 140 MMOL/L (ref 136–145)
WBC # BLD AUTO: 12.3 K/UL (ref 3.6–11)

## 2024-02-29 PROCEDURE — 80048 BASIC METABOLIC PNL TOTAL CA: CPT

## 2024-02-29 PROCEDURE — 97530 THERAPEUTIC ACTIVITIES: CPT

## 2024-02-29 PROCEDURE — 51701 INSERT BLADDER CATHETER: CPT

## 2024-02-29 PROCEDURE — L0628 LSO FLEX NO RI STAYS PRE OTS: HCPCS

## 2024-02-29 PROCEDURE — 2580000003 HC RX 258: Performed by: PHYSICIAN ASSISTANT

## 2024-02-29 PROCEDURE — 97161 PT EVAL LOW COMPLEX 20 MIN: CPT

## 2024-02-29 PROCEDURE — G0378 HOSPITAL OBSERVATION PER HR: HCPCS

## 2024-02-29 PROCEDURE — 6360000002 HC RX W HCPCS: Performed by: PHYSICIAN ASSISTANT

## 2024-02-29 PROCEDURE — 97165 OT EVAL LOW COMPLEX 30 MIN: CPT

## 2024-02-29 PROCEDURE — 85027 COMPLETE CBC AUTOMATED: CPT

## 2024-02-29 PROCEDURE — 99024 POSTOP FOLLOW-UP VISIT: CPT | Performed by: PHYSICIAN ASSISTANT

## 2024-02-29 PROCEDURE — 36415 COLL VENOUS BLD VENIPUNCTURE: CPT

## 2024-02-29 PROCEDURE — 97535 SELF CARE MNGMENT TRAINING: CPT

## 2024-02-29 PROCEDURE — 97116 GAIT TRAINING THERAPY: CPT

## 2024-02-29 PROCEDURE — 6370000000 HC RX 637 (ALT 250 FOR IP): Performed by: PHYSICIAN ASSISTANT

## 2024-02-29 RX ADMIN — ACETAMINOPHEN 650 MG: 325 TABLET ORAL at 12:53

## 2024-02-29 RX ADMIN — ACETAMINOPHEN 650 MG: 325 TABLET ORAL at 00:39

## 2024-02-29 RX ADMIN — SENNOSIDES AND DOCUSATE SODIUM 1 TABLET: 8.6; 5 TABLET ORAL at 09:05

## 2024-02-29 RX ADMIN — WATER 2000 MG: 1 INJECTION INTRAMUSCULAR; INTRAVENOUS; SUBCUTANEOUS at 00:39

## 2024-02-29 RX ADMIN — OXYCODONE 5 MG: 5 TABLET ORAL at 08:55

## 2024-02-29 RX ADMIN — POLYETHYLENE GLYCOL 3350 17 G: 17 POWDER, FOR SOLUTION ORAL at 09:08

## 2024-02-29 RX ADMIN — METOPROLOL SUCCINATE 25 MG: 25 TABLET, EXTENDED RELEASE ORAL at 10:08

## 2024-02-29 RX ADMIN — SODIUM CHLORIDE, PRESERVATIVE FREE 10 ML: 5 INJECTION INTRAVENOUS at 10:09

## 2024-02-29 RX ADMIN — FAMOTIDINE 20 MG: 20 TABLET ORAL at 09:06

## 2024-02-29 RX ADMIN — ATORVASTATIN CALCIUM 20 MG: 20 TABLET, FILM COATED ORAL at 09:06

## 2024-02-29 RX ADMIN — AMLODIPINE BESYLATE 10 MG: 5 TABLET ORAL at 10:08

## 2024-02-29 ASSESSMENT — PAIN DESCRIPTION - LOCATION
LOCATION: BACK
LOCATION: BACK

## 2024-02-29 ASSESSMENT — PAIN SCALES - GENERAL
PAINLEVEL_OUTOF10: 7
PAINLEVEL_OUTOF10: 5

## 2024-02-29 ASSESSMENT — PAIN DESCRIPTION - ORIENTATION
ORIENTATION: LOWER
ORIENTATION: LOWER

## 2024-02-29 ASSESSMENT — PAIN DESCRIPTION - DESCRIPTORS
DESCRIPTORS: PRESSURE
DESCRIPTORS: ACHING

## 2024-02-29 ASSESSMENT — PAIN DESCRIPTION - PAIN TYPE: TYPE: SURGICAL PAIN

## 2024-02-29 NOTE — PLAN OF CARE
Problem: Occupational Therapy - Adult  Goal: By Discharge: Performs self-care activities at highest level of function for planned discharge setting.  See evaluation for individualized goals.  Description: FUNCTIONAL STATUS PRIOR TO ADMISSION:  Patient was MOD I using RW PRN for functional mobility. Patient reports being active, enjoys cooking and cleaning and going to her time share for vacations.     ADL Assistance: Independent, Homemaking Assistance: Independent,  , Transfer Assistance: Independent, Active : Yes     HOME SUPPORT: Patient lived with spouse.    Occupational Therapy Goals:  Initiated 2/29/2024    1.  Patient will perform lower body dressing with [place] using AE PRN within 7 days.  2.  Patient will perform toileting with [place] using most appropriate DME within 7 days.    3.  Patient will perform standing grooming at [place] within 7 days.  4.  Patient will don/doff back brace at [place] within 7 days.  5.  Patient will verbalize/demonstrate 3/3 back precautions during ADL tasks without cues within 7 days.      Outcome: Progressing   OCCUPATIONAL THERAPY EVALUATION    Patient: Tonya Brantley (69 y.o. female)  Date: 2/29/2024  Primary Diagnosis: Spinal stenosis, lumbar region, with neurogenic claudication [M48.062]  Low back pain [M54.50]  S/P lumbar laminectomy [Z98.890]  Spinal stenosis of lumbar region with neurogenic claudication [M48.062]  Procedure(s) (LRB):  L2-L5 LUMBAR LAMINECTOMY (N/A) 1 Day Post-Op     Precautions:           Spinal Precautions: No Bending, No Lifting, No Twisting        ASSESSMENT :  The patient is limited by decreased functional mobility, independence in ADLs, high-level IADLs, ROM, strength, activity tolerance, endurance, cognition, coordination, balance s/p L2-L5 POD #1. Patient received with spouse at bedside, agreeable to OT evaluation. Patient with poor recall of back precautions, provided verbal education and demonstration. Patient able to 
  Problem: Pain  Goal: Verbalizes/displays adequate comfort level or baseline comfort level  Outcome: Progressing     Problem: Safety - Adult  Goal: Free from fall injury  Outcome: Progressing     Problem: Discharge Planning  Goal: Discharge to home or other facility with appropriate resources  Outcome: Progressing  Flowsheets (Taken 2/28/2024 2040)  Discharge to home or other facility with appropriate resources: Identify barriers to discharge with patient and caregiver     
  Problem: Physical Therapy - Adult  Goal: By Discharge: Performs mobility at highest level of function for planned discharge setting.  See evaluation for individualized goals.  Description: FUNCTIONAL STATUS PRIOR TO ADMISSION: Pt was mobilizing mod I using SPC in her home and RW in the community.      HOME SUPPORT PRIOR TO ADMISSION: The patient lived with  but did not require assistance.    Physical Therapy Goals  Initiated 2/29/2024  1.  Patient will move from supine to sit and sit to supine, scoot up and down, and roll side to side in bed with modified independence within 4 day(s).    2.  Patient will perform sit to stand with modified independence within 4 day(s).  3.  Patient will transfer from bed to chair and chair to bed with modified independence using the least restrictive device within 4 day(s).  4.  Patient will ambulate with modified independence for 150 feet with the least restrictive device within 4 day(s).   5.  Patient will ascend/descend 4 stairs with single handrail(s) with modified independence within 4 day(s).  6. Patient will verbalize and demonstrate understanding of spinal precautions (No bending, lifting greater than 5 lbs, or twisting; log-roll technique; frequent repositioning as instructed) within 4 days.    2/29/2024 1340 by Ariadna Amaya, PT  Outcome: Progressing  2/29/2024 1119 by Ariadna Amaya, PT  Outcome: Progressing     Problem: Occupational Therapy - Adult  Goal: By Discharge: Performs self-care activities at highest level of function for planned discharge setting.  See evaluation for individualized goals.  Description: FUNCTIONAL STATUS PRIOR TO ADMISSION:  Patient was MOD I using RW PRN for functional mobility. Patient reports being active, enjoys cooking and cleaning and going to her time share for vacations.     ADL Assistance: Independent, Homemaking Assistance: Independent,  , Transfer Assistance: Independent, Active : Yes     HOME SUPPORT: Patient 
  Problem: Physical Therapy - Adult  Goal: By Discharge: Performs mobility at highest level of function for planned discharge setting.  See evaluation for individualized goals.  Description: FUNCTIONAL STATUS PRIOR TO ADMISSION: Pt was mobilizing mod I using SPC in her home and RW in the community.      HOME SUPPORT PRIOR TO ADMISSION: The patient lived with  but did not require assistance.    Physical Therapy Goals  Initiated 2/29/2024  1.  Patient will move from supine to sit and sit to supine, scoot up and down, and roll side to side in bed with modified independence within 4 day(s).    2.  Patient will perform sit to stand with modified independence within 4 day(s).  3.  Patient will transfer from bed to chair and chair to bed with modified independence using the least restrictive device within 4 day(s).  4.  Patient will ambulate with modified independence for 150 feet with the least restrictive device within 4 day(s).   5.  Patient will ascend/descend 4 stairs with single handrail(s) with modified independence within 4 day(s).  6. Patient will verbalize and demonstrate understanding of spinal precautions (No bending, lifting greater than 5 lbs, or twisting; log-roll technique; frequent repositioning as instructed) within 4 days.  Outcome: Progressing  PHYSICAL THERAPY EVALUATION    Patient: Tonya Brantley (69 y.o. female)  Date: 2/29/2024  Primary Diagnosis: Spinal stenosis, lumbar region, with neurogenic claudication [M48.062]  Low back pain [M54.50]  S/P lumbar laminectomy [Z98.890]  Spinal stenosis of lumbar region with neurogenic claudication [M48.062]  Procedure(s) (LRB):  L2-L5 LUMBAR LAMINECTOMY (N/A) 1 Day Post-Op   Precautions:           Spinal Precautions: No Bending, No Lifting, No Twisting            ASSESSMENT :   The patient presents with generalized weakness, decreased ROM, decreased safety awareness, poor comprehension of back precautions (no carryover throughout session and required 
Anticipate this will not improve.  She was overall SBA-CGA for bed mobility, transfers, and ambulation.  Pt was able to progress ambulation distance and tolerated 100 ft.  Session focused on stair navigation.  Pt was able to navigate 4 steps with CGA and VC for sequencing.  Ended session returned to bed and left with all needs met.  Pt is cleared for discharge from a PT standpoint.  Recommending  PT upon discharge.        PLAN:  Patient continues to benefit from skilled intervention to address the above impairments.  Continue treatment per established plan of care.    Recommendation for discharge: (in order for the patient to meet his/her long term goals): Therapy 2 days/week in the home    Other factors to consider for discharge: lives with , no carryover of education, unable/unwilling to adhere to back precautions, pain, fall risk    IF patient discharges home will need the following DME: patient owns DME required for discharge       SUBJECTIVE:   Patient stated, \"It hurts too much.\" Regarding utilizing log roll technique     OBJECTIVE DATA SUMMARY:   Critical Behavior:  Orientation  Overall Orientation Status: Within Normal Limits  Orientation Level: Oriented X4  Cognition  Overall Cognitive Status: WNL    Functional Mobility Training:  Bed Mobility:  Bed Mobility Training  Bed Mobility Training: Yes  Rolling: Contact-guard assistance;Additional time (log roll, use of bed rails)  Supine to Sit: Contact-guard assistance  Sit to Supine:  (up in chair)  Transfers:  Transfer Training  Transfer Training: Yes  Sit to Stand: Contact-guard assistance  Stand to Sit: Contact-guard assistance  Bed to Chair: Contact-guard assistance  Toilet Transfer: Contact-guard assistance (cueing for hand placement on RW vs grab bar and for positioning, also cueing provided for back prec and to avoid twisting)  Balance:  Balance  Sitting: Impaired  Sitting - Static: Good (unsupported)  Sitting - Dynamic: Good

## 2024-02-29 NOTE — PROGRESS NOTES
Orders received, chart reviewed and patient evaluated by occupational therapy. Pending progression with skilled acute occupational therapy, recommend:  Therapy 2 days/week in the home    Recommend with nursing patient to complete as able in order to maintain strength, endurance and independence: OOB to chair 3x/day w/ RW, ADLs with supervision/setup and mobilizing to the bathroom for toileting with SPV - min A assist. Thank you for your assistance.     Full evaluation to follow.   FATOUMATA Obrien, OTR/L    
Physical Therapy 2/28/2024    Orders received and chart reviewed up to date. Pt POD 0 L2-L5 laminectomy. Will follow-up tomorrow for PT evaluation/ as appropriate.     Thank you.  Anu Karimi, PT, DPT    
Pt doing well. Pain controlled. Cleared by PT/OT for discharge to home. Voiding without issue now. All questions answered. Outpatient follow up in 2 weeks, sooner if needed.  
Spine Surgery Progress Note    Admit Date: 2/28/2024   LOS: 0 days      Daily Progress Note: 2/28/2024    POD:Day of Surgery    S/P: Procedure(s):  L2-L5 LUMBAR LAMINECTOMY    Vitals:    02/28/24 1415   BP: (!) 108/45   Pulse:    Resp:    Temp:    SpO2:       Lab Results   Component Value Date/Time    HGB 12.4 02/14/2024 01:36 PM    INR 1.0 02/14/2024 01:36 PM       Patient still drowsy from surgery. Pain well-controlled at present. Some nausea earlier which has resolved.  She has not ambulated or voided yet.  Denies chest pain, dyspnea, headache.    Calves soft/NTTP Bilaterally.  Moving LE well.  Neurocirculatory exam WNL.  Motor and sensation intact.   Incision OK; no drainage. Dressing clean and dry.    Plan:  -Pain control - scheduled tylenol; prn oxycodone, dilaudid  -PT/OT; in lumbar quickdraw brace  -Bladder checks q 6hr; straight cath per protocol  -Regular diet  -Bowel regimen  -Labs tomorrow AM  -Cont home meds as ordered  -Daily dressing changes to incision  - consult for HH     Discharge pending.   All questions were answered. Follow up in Dr. Dangelo's office in 2 weeks, sooner if needed.    TRISTIAN Jamil  
  4.  Advance diet  6.  Discharge pending PT clearance and continued pain control.  Today v tomorrow       Signed By: TRISTIAN Villanueva

## 2024-02-29 NOTE — CARE COORDINATION
Transition of Care Plan:    RUR: Observation   Prior Level of Functioning: Independent   Disposition: Home with Family Assistance and Home Health   Home Health; Accepted  [facility]   Follow up appointments: PCP   DME needed: None   Transportation at discharge: Spouse   IM/IMM Medicare/ letter given: N/A   Caregiver Contact: Jose Brantley (Spouse)  [phone]   Discharge Caregiver contacted prior to discharge? N/A   Care Conference needed? No       Per conversation with pt; this cm discuss the recs given by therapy for HH services. This cm provided the pt with education regarding the service. The pt reported that she was open to the service and did not have a preference in a HH agency. This cm informed her that he would update her once an update was available.     02/29/24 1226   Service Assessment   Patient Orientation Alert and Oriented   Cognition Alert   History Provided By Patient   Discharge Planning   Current Services Prior To Admission None   Potential Assistance Needed Home Care   Type of Home Care Services PT   Patient expects to be discharged to: House   Services At/After Discharge   Transition of Care Consult (CM Consult) Discharge Planning;Home Health   Internal Home Health No   Condition of Participation: Discharge Planning   The Plan for Transition of Care is related to the following treatment goals: Home Health   The Patient and/or Patient Representative was provided with a Choice of Provider? Patient   The Patient and/Or Patient Representative agree with the Discharge Plan? Yes   Freedom of Choice list was provided with basic dialogue that supports the patient's individualized plan of care/goals, treatment preferences, and shares the quality data associated with the providers?  Yes

## 2024-02-29 NOTE — OP NOTE
[address]  [address]                            OPERATIVE REPORT      PATIENT NAME: ARMAAN GONZALEZ                 : 1955  MED REC NO: [id number]                       ROOM: [facility]  ACCOUNT NO: [id number]                       ADMIT DATE: 2024  PROVIDER: Jay Dangelo MD    DATE OF SERVICE:  2024    PREOPERATIVE DIAGNOSES:  Lumbar stenosis with neurogenic claudication, L2-3, L3-4, L4-5.    POSTOPERATIVE DIAGNOSES:  Lumbar stenosis with neurogenic claudication, L2-3, L3-4, L4-5.    PROCEDURES PERFORMED:  Lumbar laminectomy with partial facetectomy with decompression at L2-3, L3-4, and L4-5 with decompression bilaterally, L2, L3, L4, and L5 nerve roots.    SURGEON:  Jay Dangelo MD    ASSISTANT:  Mick Pope PA-C    ANESTHESIA:  General    ESTIMATED BLOOD LOSS:  Minimal.    SPECIMENS REMOVED:  None.    INTRAOPERATIVE FINDINGS:  Lumbar stenosis     COMPLICATIONS:  None.    IMPLANTS:  None    INDICATIONS:  This is a 69-year-old female with chronic neck and lower back pain, previous cervical fusion for cervical stenosis, has actually done well from that, but continued to have severe neurogenic claudication from spinal stenosis at L2-3, L3-4, and L4-5.  She has failed conservative treatment including injection therapy.  She is here for decompression.    DESCRIPTION OF PROCEDURE:  The patient was identified, brought to the operative suite, underwent general anesthesia without difficulty.  Preop neuromonitoring was placed.  Baselines obtained.  These remained stable throughout the surgical procedure.  The patient was placed prone on the open Jose A table with all bony prominences well padded.  Back was prepped and draped sterilely.  Time-out confirmed.  After prepping and draping, we then did a midline incision from L2 to L5.  Dissection was carried down to the thoracodorsal fascia.  Subperiosteal dissection was performed

## 2025-02-04 ENCOUNTER — HOSPITAL ENCOUNTER (OUTPATIENT)
Facility: HOSPITAL | Age: 70
Setting detail: RECURRING SERIES
Discharge: HOME | End: 2025-02-07
Attending: ORTHOPAEDIC SURGERY
Payer: MEDICARE

## 2025-02-04 PROCEDURE — 97110 THERAPEUTIC EXERCISES: CPT

## 2025-02-04 PROCEDURE — 97162 PT EVAL MOD COMPLEX 30 MIN: CPT

## 2025-02-04 NOTE — THERAPY EVALUATION
[facility]  [address]  [place] [address]  Phone: [phone]   Fax: [phone]          PHYSICAL THERAPY - MEDICARE EVALUATION/PLAN OF CARE NOTE (updated 3/23)      Date: 2025          Patient Name:  Tonya Brantley :  1955   Medical   Diagnosis:  Hx of laminectomy [Z98.890] Treatment Diagnosis:  M54.41  LUMBAGO WITH SCIATICA, RIGHT SIDE and M54.42  LUMBAGO WITH SCIATICA, LEFT SIDE  and R26.81   Unsteadiness on feet    Referral Source:  Jay Dangelo MD Provider #:  [id number]                Insurance: Payor: HU[employer] MEDICARE / Plan: [employer] GOLD PLUS HMO / Product Type: *No Product type* /      Patient  verified yes     Visit #   Current  / Total 1 24   Time   In / Out 9:30 10:30   Total Treatment Time 60   Total Timed Codes 25   1:1 Treatment Time 25      [facility] Totals Reminder:  bill using total billable   min of TIMED therapeutic procedures and modalities.   8-22 min = 1 unit; 23-37 min = 2 units; 38-52 min = 3 units;  53-67 min = 4 units; 68-82 min = 5 units           SUBJECTIVE  Pain Level (0-10 scale): 5  []constant [x]intermittent []improving []worsening []no change since onset    Any medication changes, allergies to medications, adverse drug reactions, diagnosis change, or new procedure performed?: [x] No    [] Yes (see summary sheet for update)  Medications: Verified on Patient Summary List    Subjective functional status/changes:     Patient is a 70 year old presenting to therapy today with a chief complaint of bilateral LE stiffness, difficulty walking, and balance. Patient reports that she was dealing with chronic back pain with radicular symptoms in both legs which led to her having an L2-5 lumbar laminectomy on 2024. Patient does also have history of myelomalacia. Patient reports that since the surgery she has seen improvements in her back pain as well as the nerve related symptoms,

## 2025-02-06 ENCOUNTER — HOSPITAL ENCOUNTER (OUTPATIENT)
Facility: HOSPITAL | Age: 70
Setting detail: RECURRING SERIES
Discharge: HOME | End: 2025-02-09
Attending: ORTHOPAEDIC SURGERY
Payer: MEDICARE

## 2025-02-06 PROCEDURE — 97110 THERAPEUTIC EXERCISES: CPT

## 2025-02-06 NOTE — PROGRESS NOTES
PHYSICAL THERAPY - MEDICARE DAILY TREATMENT NOTE (updated 3/23)      Date: 2025          Patient Name:  Tonya Brantley :  1955   Medical   Diagnosis:  Hx of laminectomy [Z98.890] Treatment Diagnosis:  M54.41  LUMBAGO WITH SCIATICA, RIGHT SIDE and M54.42  LUMBAGO WITH SCIATICA, LEFT SIDE  and R26.81   Unsteadiness on feet    Referral Source:  Jay Dangelo MD Insurance:   Payor: HU[employer] MEDICARE / Plan: HU[employer] GOLD PLUS HMO / Product Type: *No Product type* /                     Patient  verified yes     Visit #   Current  / Total 2 24   Time   In / Out 10:30 11:18   Total Treatment Time 48   Total Timed Codes 48   1:1 Treatment Time 48      [facility] Totals Reminder:  bill using total billable   min of TIMED therapeutic procedures and modalities.   8-22 min = 1 unit; 23-37 min = 2 units; 38-52 min = 3 units; 53-67 min = 4 units; 68-82 min = 5 units            SUBJECTIVE    Pain Level (0-10 scale): 5    Any medication changes, allergies to medications, adverse drug reactions, diagnosis change, or new procedure performed?: [x] No    [] Yes (see summary sheet for update)  Medications: Verified on Patient Summary List    Subjective functional status/changes:     Patient reports that she did all of her exercises yesterday so she feels a little stiff. Overall feels that the exercises are helping.     OBJECTIVE      Therapeutic Procedures:  Tx Min Billable or 1:1 Min (if diff from Tx Min) Procedure, Rationale, Specifics   48 [id number] Therapeutic Exercise (timed):  increase ROM, strength, coordination, balance, and proprioception to improve patient's ability to progress to PLOF and address remaining functional goals. (see flow sheet as applicable)     Details if applicable:            Details if applicable:           Details if applicable:           Details if applicable:            Details if applicable:     48 48    Total Total       [x]  Patient Education billed concurrently with other procedures   [x]

## 2025-02-11 ENCOUNTER — HOSPITAL ENCOUNTER (OUTPATIENT)
Facility: HOSPITAL | Age: 70
Setting detail: RECURRING SERIES
End: 2025-02-11
Attending: ORTHOPAEDIC SURGERY
Payer: MEDICARE

## 2025-02-13 ENCOUNTER — HOSPITAL ENCOUNTER (OUTPATIENT)
Facility: HOSPITAL | Age: 70
Setting detail: RECURRING SERIES
Discharge: HOME | End: 2025-02-16
Attending: ORTHOPAEDIC SURGERY
Payer: MEDICARE

## 2025-02-13 PROCEDURE — 97110 THERAPEUTIC EXERCISES: CPT

## 2025-02-13 NOTE — PROGRESS NOTES
PHYSICAL THERAPY - MEDICARE DAILY TREATMENT NOTE (updated 3/23)      Date: 2025          Patient Name:  Tonya Brantley :  1955   Medical   Diagnosis:  Lumbago with sciatica, right side [M54.41]  Lumbago with sciatica, left side [M54.42]  Unsteadiness on feet [R26.81] Treatment Diagnosis:  M54.41  LUMBAGO WITH SCIATICA, RIGHT SIDE and M54.42  LUMBAGO WITH SCIATICA, LEFT SIDE  and R26.81   Unsteadiness on feet    Referral Source:  Jay Dangelo MD Insurance:   Payor: HU[employer]NA MEDICARE / Plan: HU[employer] GOLD PLUS HMO / Product Type: *No Product type* /                     Patient  verified yes     Visit #   Current  / Total 3 24   Time   In / Out 10:37 11:20   Total Treatment Time 43   Total Timed Codes 43   1:1 Treatment Time 43      [facility] Totals Reminder:  bill using total billable   min of TIMED therapeutic procedures and modalities.   8-22 min = 1 unit; 23-37 min = 2 units; 38-52 min = 3 units; 53-67 min = 4 units; 68-82 min = 5 units            SUBJECTIVE    Pain Level (0-10 scale): 4    Any medication changes, allergies to medications, adverse drug reactions, diagnosis change, or new procedure performed?: [x] No    [] Yes (see summary sheet for update)  Medications: Verified on Patient Summary List    Subjective functional status/changes:     Patient reports that she had some muscle soreness after last session, but otherwise is doing well.     OBJECTIVE      Therapeutic Procedures:  Tx Min Billable or 1:1 Min (if diff from Tx Min) Procedure, Rationale, Specifics   43 43 [id number] Therapeutic Exercise (timed):  increase ROM, strength, coordination, balance, and proprioception to improve patient's ability to progress to PLOF and address remaining functional goals. (see flow sheet as applicable)     Details if applicable:            Details if applicable:           Details if applicable:           Details if applicable:            Details if applicable:     43 43    Total Total       [x]  Patient

## 2025-02-18 ENCOUNTER — HOSPITAL ENCOUNTER (OUTPATIENT)
Facility: HOSPITAL | Age: 70
Setting detail: RECURRING SERIES
Discharge: HOME | End: 2025-02-21
Attending: ORTHOPAEDIC SURGERY
Payer: MEDICARE

## 2025-02-18 PROCEDURE — 97110 THERAPEUTIC EXERCISES: CPT

## 2025-02-18 NOTE — PROGRESS NOTES
PHYSICAL THERAPY - MEDICARE DAILY TREATMENT NOTE (updated 3/23)      Date: 2025          Patient Name:  Tonya Brantley :  1955   Medical   Diagnosis:  Lumbago with sciatica, right side [M54.41]  Lumbago with sciatica, left side [M54.42]  Unsteadiness on feet [R26.81] Treatment Diagnosis:  M54.41  LUMBAGO WITH SCIATICA, RIGHT SIDE and M54.42  LUMBAGO WITH SCIATICA, LEFT SIDE  and R26.81   Unsteadiness on feet    Referral Source:  Jay Dangelo MD Insurance:   Payor: HU[employer]NA MEDICARE / Plan: HU[employer]NA GOLD PLUS HMO / Product Type: *No Product type* /                     Patient  verified yes     Visit #   Current  / Total 4 24   Time   In / Out 10:35 11:34   Total Treatment Time 59   Total Timed Codes 58   1:1 Treatment Time 58      [facility] Totals Reminder:  bill using total billable   min of TIMED therapeutic procedures and modalities.   8-22 min = 1 unit; 23-37 min = 2 units; 38-52 min = 3 units; 53-67 min = 4 units; 68-82 min = 5 units            SUBJECTIVE    Pain Level (0-10 scale): 4    Any medication changes, allergies to medications, adverse drug reactions, diagnosis change, or new procedure performed?: [x] No    [] Yes (see summary sheet for update)  Medications: Verified on Patient Summary List    Subjective functional status/changes:     Patient reports that her abs were sore after last session. Overall she hasn't noticed much of a change in her leg stiffness yet.     OBJECTIVE      Therapeutic Procedures:  Tx Min Billable or 1:1 Min (if diff from Tx Min) Procedure, Rationale, Specifics   59 58 [id number] Therapeutic Exercise (timed):  increase ROM, strength, coordination, balance, and proprioception to improve patient's ability to progress to PLOF and address remaining functional goals. (see flow sheet as applicable)     Details if applicable:            Details if applicable:           Details if applicable:           Details if applicable:            Details if applicable:     59 58    Total

## 2025-02-21 ENCOUNTER — HOSPITAL ENCOUNTER (OUTPATIENT)
Facility: HOSPITAL | Age: 70
Setting detail: RECURRING SERIES
Discharge: HOME | End: 2025-02-24
Attending: ORTHOPAEDIC SURGERY
Payer: MEDICARE

## 2025-02-21 PROCEDURE — 97110 THERAPEUTIC EXERCISES: CPT

## 2025-02-21 NOTE — PROGRESS NOTES
PHYSICAL THERAPY - MEDICARE DAILY TREATMENT NOTE (updated 3/23)      Date: 2025          Patient Name:  Tonya Brantley :  1955   Medical   Diagnosis:  Lumbago with sciatica, right side [M54.41]  Lumbago with sciatica, left side [M54.42]  Unsteadiness on feet [R26.81] Treatment Diagnosis:  M54.41  LUMBAGO WITH SCIATICA, RIGHT SIDE and M54.42  LUMBAGO WITH SCIATICA, LEFT SIDE  and R26.81   Unsteadiness on feet    Referral Source:  Jay Dangelo MD Insurance:   Payor: HU[employer]NA MEDICARE / Plan: HU[employer]NA GOLD PLUS HMO / Product Type: *No Product type* /                     Patient  verified yes     Visit #   Current  / Total 5 24   Time   In / Out 9:32 10:24   Total Treatment Time 52   Total Timed Codes 52   1:1 Treatment Time 40      [facility] Totals Reminder:  bill using total billable   min of TIMED therapeutic procedures and modalities.   8-22 min = 1 unit; 23-37 min = 2 units; 38-52 min = 3 units; 53-67 min = 4 units; 68-82 min = 5 units            SUBJECTIVE    Pain Level (0-10 scale): 4    Any medication changes, allergies to medications, adverse drug reactions, diagnosis change, or new procedure performed?: [x] No    [] Yes (see summary sheet for update)  Medications: Verified on Patient Summary List    Subjective functional status/changes:     Patient reports that she feels like she sees some progress. Notes that her legs are less stiff today than they usually are.     OBJECTIVE      Therapeutic Procedures:  Tx Min Billable or 1:1 Min (if diff from Tx Min) Procedure, Rationale, Specifics   52 [id number] Therapeutic Exercise (timed):  increase ROM, strength, coordination, balance, and proprioception to improve patient's ability to progress to PLOF and address remaining functional goals. (see flow sheet as applicable)     Details if applicable:            Details if applicable:           Details if applicable:           Details if applicable:            Details if applicable:     52 40    Total Total

## 2025-02-25 ENCOUNTER — HOSPITAL ENCOUNTER (OUTPATIENT)
Facility: HOSPITAL | Age: 70
Setting detail: RECURRING SERIES
Discharge: HOME | End: 2025-02-28
Attending: ORTHOPAEDIC SURGERY
Payer: MEDICARE

## 2025-02-25 PROCEDURE — 97110 THERAPEUTIC EXERCISES: CPT

## 2025-02-25 NOTE — PROGRESS NOTES
PHYSICAL THERAPY - MEDICARE DAILY TREATMENT NOTE (updated 3/23)      Date: 2025          Patient Name:  Tonya Brantley :  1955   Medical   Diagnosis:  Lumbago with sciatica, right side [M54.41]  Lumbago with sciatica, left side [M54.42]  Unsteadiness on feet [R26.81] Treatment Diagnosis:  M54.41  LUMBAGO WITH SCIATICA, RIGHT SIDE and M54.42  LUMBAGO WITH SCIATICA, LEFT SIDE  and R26.81   Unsteadiness on feet    Referral Source:  Jay Dangelo MD Insurance:   Payor: HU[employer]NA MEDICARE / Plan: HU[employer]NA GOLD PLUS HMO / Product Type: *No Product type* /                     Patient  verified yes     Visit #   Current  / Total 6 24   Time   In / Out 11:30 12:18   Total Treatment Time 48   Total Timed Codes 48   1:1 Treatment Time 33      [facility] Totals Reminder:  bill using total billable   min of TIMED therapeutic procedures and modalities.   8-22 min = 1 unit; 23-37 min = 2 units; 38-52 min = 3 units; 53-67 min = 4 units; 68-82 min = 5 units            SUBJECTIVE    Pain Level (0-10 scale): 5    Any medication changes, allergies to medications, adverse drug reactions, diagnosis change, or new procedure performed?: [x] No    [] Yes (see summary sheet for update)  Medications: Verified on Patient Summary List    Subjective functional status/changes:     Patient reports that she continues to have some leg stiffness, especially with prolonged sitting.     OBJECTIVE      Therapeutic Procedures:  Tx Min Billable or 1:1 Min (if diff from Tx Min) Procedure, Rationale, Specifics   48 [id number] Therapeutic Exercise (timed):  increase ROM, strength, coordination, balance, and proprioception to improve patient's ability to progress to PLOF and address remaining functional goals. (see flow sheet as applicable)     Details if applicable:            Details if applicable:           Details if applicable:           Details if applicable:            Details if applicable:     48 33    Total Total       [x]  Patient

## 2025-02-27 ENCOUNTER — HOSPITAL ENCOUNTER (OUTPATIENT)
Facility: HOSPITAL | Age: 70
Setting detail: RECURRING SERIES
End: 2025-02-27
Attending: ORTHOPAEDIC SURGERY
Payer: MEDICARE

## 2025-02-27 PROCEDURE — 97110 THERAPEUTIC EXERCISES: CPT

## 2025-02-27 NOTE — PROGRESS NOTES
PHYSICAL THERAPY - MEDICARE DAILY TREATMENT NOTE (updated 3/23)      Date: 2025          Patient Name:  Tonya Brantley :  1955   Medical   Diagnosis:  Lumbago with sciatica, right side [M54.41]  Lumbago with sciatica, left side [M54.42]  Unsteadiness on feet [R26.81] Treatment Diagnosis:  M54.41  LUMBAGO WITH SCIATICA, RIGHT SIDE and M54.42  LUMBAGO WITH SCIATICA, LEFT SIDE  and R26.81   Unsteadiness on feet    Referral Source:  Jay Dangelo MD Insurance:   Payor: HUMANA MEDICARE / Plan: HU[employer]NA GOLD PLUS HMO / Product Type: *No Product type* /                     Patient  verified yes     Visit #   Current  / Total 7 24   Time   In / Out 11:04 11:49   Total Treatment Time 45   Total Timed Codes 45   1:1 Treatment Time 31      [facility] Totals Reminder:  bill using total billable   min of TIMED therapeutic procedures and modalities.   8-22 min = 1 unit; 23-37 min = 2 units; 38-52 min = 3 units; 53-67 min = 4 units; 68-82 min = 5 units            SUBJECTIVE    Pain Level (0-10 scale): 1    Any medication changes, allergies to medications, adverse drug reactions, diagnosis change, or new procedure performed?: [x] No    [] Yes (see summary sheet for update)  Medications: Verified on Patient Summary List    Subjective functional status/changes:     Patient reports that she felt okay after the new exercises last session. Notes that she is getting more comfortable with the balance activities, but they are still challenging.     OBJECTIVE      Therapeutic Procedures:  Tx Min Billable or 1:1 Min (if diff from Tx Min) Procedure, Rationale, Specifics   45 31 [id number] Therapeutic Exercise (timed):  increase ROM, strength, coordination, balance, and proprioception to improve patient's ability to progress to PLOF and address remaining functional goals. (see flow sheet as applicable)     Details if applicable:            Details if applicable:           Details if applicable:           Details if applicable:

## 2025-03-04 ENCOUNTER — HOSPITAL ENCOUNTER (OUTPATIENT)
Facility: HOSPITAL | Age: 70
Setting detail: RECURRING SERIES
Discharge: HOME | End: 2025-03-07
Attending: ORTHOPAEDIC SURGERY
Payer: MEDICARE

## 2025-03-04 PROCEDURE — 97110 THERAPEUTIC EXERCISES: CPT

## 2025-03-04 NOTE — PROGRESS NOTES
[facility]  [address]  [place] [address]  Phone: [phone]   Fax: [phone]     PHYSICAL THERAPY PROGRESS NOTE  Patient Name:  Tonya Brantley :  1955   Treatment/Medical Diagnosis: Lumbago with sciatica, right side [M54.41]  Lumbago with sciatica, left side [M54.42]  Unsteadiness on feet [R26.81]   Referral Source:  Jay Dangelo MD     Date of Initial Visit:  2025 Attended Visits:  8 Missed Visits:  1     SUMMARY OF TREATMENT/ASSESSMENT:  Patient is a 70 year old being seen for bilateral leg \"stiffness\" and gait and balance deficits. Patient has been seen for 8  visits and has been treated using therapeutic exercise to make improvements with lumbar AROM, lower quarter strength, balance, gait, and overall functional mobility.      CURRENT STATUS/GOALS:    Since the initial evaluation patient demonstrates global improvements in lower quarter strength, balance, gait, pain levels, and overall functional mobility. Today she demonstrates global improvements in LE strength, including bilateral hip flexion to 4+/5 (vs 4/5), left dorsiflexion to 4-/5 (vs 3-/5), bilateral hip ER and abduction to 4/5 (vs R abd = 3+, R ER = 4-, L abd = 3-, and L ER = 3+). Her 30 second sit to stand improved to 12 (vs 9), single leg stance improved to R= 11 seconds (vs 2 seconds); L= 10 seconds (vs 3 seconds), and tandem stance improved to R (fwd)= 34 seconds (vs 8 seconds); L (fwd)= 39 seconds (vs 4 seconds). She also demonstrates improvement on her timed up and go as well, today completing the test in 33 seconds with a SPC (vs 42 seconds with a SPC). Her gait speed improved slightly as well. She has met 2/6 goals for discharge, however continues to demonstrate decreased strength and balance deficits which increase her risk for falls. She would continue to benefit from skilled therapy to address remaining goals, decrease fall 
2.25 laps (2 laps; 0.46 m/s)        Objective/Functional Outcome Measure:  FOTO Score: balance: 46/100 (43/100), upper le/100 (60/100)  FOTO score = an established functional score where 100 = no disability    Pain Level at end of session (0-10 scale): 1      Assessment   Since the initial evaluation patient demonstrates global improvements in lower quarter strength, balance, gait, pain levels, and overall functional mobility. Today she demonstrates global improvements in LE strength, including bilateral hip flexion to 4+/5 (vs 4/5), left dorsiflexion to 4-/5 (vs 3-/5), bilateral hip ER and abduction to 4/5 (vs R abd = 3+, R ER = 4-, L abd = 3-, and L ER = 3+). Her 30 second sit to stand improved to 12 (vs 9), single leg stance improved to R= 11 seconds (vs 2 seconds); L= 10 seconds (vs 3 seconds), and tandem stance improved to R (fwd)= 34 seconds (vs 8 seconds); L (fwd)= 39 seconds (vs 4 seconds). She also demonstrates improvement on her timed up and go as well, today completing the test in 33 seconds with a SPC (vs 42 seconds with a SPC). Her gait speed improved slightly as well. She has met 2/6 goals for discharge, however continues to demonstrate decreased strength and balance deficits which increase her risk for falls. She would continue to benefit from skilled therapy to address remaining goals, decrease fall risk, and improve overall mobility. Plan to continue to progress as able.       Patient will continue to benefit from skilled PT / OT services to modify and progress therapeutic interventions, analyze and address functional mobility deficits, analyze and address ROM deficits, analyze and address strength deficits, analyze and address soft tissue restrictions, analyze and cue for proper movement patterns, analyze and modify for postural abnormalities, and analyze and address imbalance/dizziness to address functional deficits and attain remaining goals.    Progress toward goals / Updated goals:  []

## 2025-03-18 ENCOUNTER — HOSPITAL ENCOUNTER (OUTPATIENT)
Facility: HOSPITAL | Age: 70
Setting detail: RECURRING SERIES
Discharge: HOME | End: 2025-03-21
Attending: ORTHOPAEDIC SURGERY
Payer: MEDICARE

## 2025-03-18 PROCEDURE — 97110 THERAPEUTIC EXERCISES: CPT

## 2025-03-18 NOTE — PROGRESS NOTES
free lumbar AROM WFL to improve ease with household chores like emptying the - progressing      PLAN  YES Continue plan of care  Re-Cert Due: 5/5/2025  [x]  Upgrade activities as tolerated  []  Discharge due to:  []  Other:      Tiffanie Moscoso, PT       3/18/2025       9:17 AM

## 2025-03-26 ENCOUNTER — HOSPITAL ENCOUNTER (OUTPATIENT)
Facility: HOSPITAL | Age: 70
Setting detail: RECURRING SERIES
Discharge: HOME | End: 2025-03-29
Attending: ORTHOPAEDIC SURGERY
Payer: MEDICARE

## 2025-03-26 PROCEDURE — 97110 THERAPEUTIC EXERCISES: CPT

## 2025-03-26 NOTE — PROGRESS NOTES
PHYSICAL THERAPY - MEDICARE DAILY TREATMENT NOTE (updated 3/23)      Date: 3/26/2025          Patient Name:  Tonya Brantley :  1955   Medical   Diagnosis:  Lumbago with sciatica, right side [M54.41]  Lumbago with sciatica, left side [M54.42]  Unsteadiness on feet [R26.81] Treatment Diagnosis:  M54.41  LUMBAGO WITH SCIATICA, RIGHT SIDE and M54.42  LUMBAGO WITH SCIATICA, LEFT SIDE  and R26.81   Unsteadiness on feet    Referral Source:  Jay Dangelo MD Insurance:   Payor: HUMANA MEDICARE / Plan: HU[employer]NA GOLD PLUS HMO / Product Type: *No Product type* /                     Patient  verified yes     Visit #   Current  / Total 10 24   Time   In / Out 10:03 10:54   Total Treatment Time 51   Total Timed Codes 51   1:1 Treatment Time 40      [facility] Totals Reminder:  bill using total billable   min of TIMED therapeutic procedures and modalities.   8-22 min = 1 unit; 23-37 min = 2 units; 38-52 min = 3 units; 53-67 min = 4 units; 68-82 min = 5 units            SUBJECTIVE    Pain Level (0-10 scale): 3    Any medication changes, allergies to medications, adverse drug reactions, diagnosis change, or new procedure performed?: [x] No    [] Yes (see summary sheet for update)  Medications: Verified on Patient Summary List    Subjective functional status/changes:     Patient reports that overall she still feels about the same with the leg stiffness. Notes that she has the most trouble with walking.     OBJECTIVE      Therapeutic Procedures:  Tx Min Billable or 1:1 Min (if diff from Tx Min) Procedure, Rationale, Specifics   51 [id number] Therapeutic Exercise (timed):  increase ROM, strength, coordination, balance, and proprioception to improve patient's ability to progress to PLOF and address remaining functional goals. (see flow sheet as applicable)     Details if applicable:            Details if applicable:           Details if applicable:           Details if applicable:            Details if applicable:     51 40

## 2025-03-31 ENCOUNTER — HOSPITAL ENCOUNTER (OUTPATIENT)
Facility: HOSPITAL | Age: 70
Setting detail: RECURRING SERIES
Discharge: HOME | End: 2025-04-03
Attending: ORTHOPAEDIC SURGERY
Payer: MEDICARE

## 2025-03-31 PROCEDURE — 97110 THERAPEUTIC EXERCISES: CPT

## 2025-03-31 NOTE — PROGRESS NOTES
PHYSICAL THERAPY - MEDICARE DAILY TREATMENT NOTE (updated 3/23)      Date: 3/31/2025          Patient Name:  Tonya Brantley :  1955   Medical   Diagnosis:  Lumbago with sciatica, right side [M54.41]  Lumbago with sciatica, left side [M54.42]  Unsteadiness on feet [R26.81] Treatment Diagnosis:  M54.41  LUMBAGO WITH SCIATICA, RIGHT SIDE and M54.42  LUMBAGO WITH SCIATICA, LEFT SIDE  and R26.81   Unsteadiness on feet    Referral Source:  Jay Dangelo MD Insurance:   Payor: HUMANA MEDICARE / Plan: HU[employer]NA GOLD PLUS HMO / Product Type: *No Product type* /                     Patient  verified yes     Visit #   Current  / Total 11 24   Time   In / Out 10:06 11:03   Total Treatment Time 57   Total Timed Codes 57   1:1 Treatment Time 38      [facility] Totals Reminder:  bill using total billable   min of TIMED therapeutic procedures and modalities.   8-22 min = 1 unit; 23-37 min = 2 units; 38-52 min = 3 units; 53-67 min = 4 units; 68-82 min = 5 units            SUBJECTIVE    Pain Level (0-10 scale): 4    Any medication changes, allergies to medications, adverse drug reactions, diagnosis change, or new procedure performed?: [x] No    [] Yes (see summary sheet for update)  Medications: Verified on Patient Summary List    Subjective functional status/changes:     Patient reports that overall she still feels about the same with the leg stiffness. She has a follow up on 4/10 to see if there is anything else to do about it.     OBJECTIVE      Therapeutic Procedures:  Tx Min Billable or 1:1 Min (if diff from Tx Min) Procedure, Rationale, Specifics   57 38 [id number] Therapeutic Exercise (timed):  increase ROM, strength, coordination, balance, and proprioception to improve patient's ability to progress to PLOF and address remaining functional goals. (see flow sheet as applicable)     Details if applicable:            Details if applicable:           Details if applicable:           Details if applicable:            Details if

## 2025-05-01 ENCOUNTER — HOSPITAL ENCOUNTER (OUTPATIENT)
Facility: HOSPITAL | Age: 70
Setting detail: RECURRING SERIES
Discharge: HOME | End: 2025-05-04
Attending: ORTHOPAEDIC SURGERY
Payer: MEDICARE

## 2025-05-01 PROCEDURE — 97110 THERAPEUTIC EXERCISES: CPT

## 2025-05-01 NOTE — PROGRESS NOTES
[facility]  [address]  [place] [address]  Phone: [phone]   Fax: [phone]     PHYSICAL THERAPY PROGRESS NOTE  Patient Name:  Tonya Brantley :  1955   Treatment/Medical Diagnosis: Lumbago with sciatica, right side [M54.41]  Lumbago with sciatica, left side [M54.42]  Unsteadiness on feet [R26.81]   Referral Source:  Jay Dangelo MD     Date of Initial Visit:  2025 Attended Visits:  12 Missed Visits:  1     SUMMARY OF TREATMENT/ASSESSMENT:  Patient is a 70 year old being seen for bilateral leg \"stiffness\" and gait and balance deficits. Patient has been seen for 12  visits and has been treated using therapeutic exercise to make improvements with lumbar AROM, lower quarter strength, balance, gait, and overall functional mobility.      CURRENT STATUS/GOALS:    Since the initial evaluation patient does demonstrate global improvements in lower quarter strength, balance, gait, pain levels, and overall functional mobility, however patient has regressed since the last re-assessment. Her lack of progress is likely attributed to not attending therapy for almost a month secondary to transportation issues. Today she demonstrates no regressions in strength, all of which have improved since the initial evaluation, including hip flexion to 4+/5 (vs 4/5), left dorsiflexion to 4-/5 (vs 3-/5), bilateral hip ER and abduction to 4/5 (vs R abd = 3+, R ER = 4-, L abd = 3-, and L ER = 3+). Her 30 second sit to stand improved to 11 (vs 9), single leg stance improved to R= 5 seconds (vs 2 seconds); L= 6 seconds (vs 3 seconds), and tandem stance improved to R (fwd)= 13 seconds (vs 8 seconds); L (fwd)= 26 seconds (vs 4 seconds). She also demonstrates improvement on her timed up and go as well, today completing the test in 36 seconds with a SPC (vs 42 seconds with a SPC). Her gait speed improved slightly as well to 0.57 m/s

## 2025-05-01 NOTE — PROGRESS NOTES
[facility]  [address]  [place] [address]  Phone: [phone]   Fax: [phone]     CONTINUED PLAN OF CARE/RECERTIFICATION FOR PHYSICAL THERAPY          Patient Name:              Tonya Brantley :  1955   Treatment/Medical Diagnosis:  Lumbago with sciatica, right side [M54.41]  Lumbago with sciatica, left side [M54.42]  Unsteadiness on feet [R26.81]   Onset Date:  2024    Referral Source:  Jay Dangelo MD Start of Care (SOC):  2025   Prior Hospitalization:  See Medical History Provider #:  [id number]      Prior Level of Function (PLOF):  Patient completed 20 minutes of exercise once or twice a week    Comorbidities:  arthritis, high cholesterol, cervical fusion, 2023, lumbar laminectomy 24    Medications:  Verified on Patient Summary List   Visits from SOC:  12 Missed Visits:  2     Progress toward Goals:  Short Term Goals: To be accomplished in 8-10 treatments.  1. Patient will be independent with initial HEP in order to transition to general wellness program. - MET  2. Patient will report worst pain level of 4/10 or better to allow for a minimum of 6 hours of interrupted sleeping ability.- MET   3.The patient will demonstrate improved tandem stance to greater than 30 seconds bilaterally to improve balance and decrease fall risk. - in progress     Long Term Goals: To be accomplished in 20-24 treatments.  1.Patient will report worst pain no greater than 2/10 to increase QOL and allow for independence with all hygienic self-care and ADL skills - progressing  2.Patient will demonstrate 5/5 BLE strength to allow for home cleaning skills independently and without rest breaks needed- progressing  3.Patient will demonstrate pain free lumbar AROM WFL to improve ease with household chores like emptying the - progressing    Key Functional Changes/Progress: lower quarter strength, improved

## 2025-05-01 NOTE — PROGRESS NOTES
PHYSICAL THERAPY - MEDICARE DAILY TREATMENT NOTE (updated 3/23)      Date: 2025          Patient Name:  Tonya Brantley :  1955   Medical   Diagnosis:  Lumbago with sciatica, right side [M54.41]  Lumbago with sciatica, left side [M54.42]  Unsteadiness on feet [R26.81] Treatment Diagnosis:  M54.41  LUMBAGO WITH SCIATICA, RIGHT SIDE and M54.42  LUMBAGO WITH SCIATICA, LEFT SIDE  and R26.81   Unsteadiness on feet    Referral Source:  Jay Dangelo MD Insurance:   Payor: [employer] MEDICARE / Plan: [employer] GOLD PLUS HMO / Product Type: *No Product type* /                     Patient  verified yes     Visit #   Current  / Total 12 24   Time   In / Out 11:00 11:56   Total Treatment Time 56   Total Timed Codes 56   1:1 Treatment Time 39      [facility] Totals Reminder:  bill using total billable   min of TIMED therapeutic procedures and modalities.   8-22 min = 1 unit; 23-37 min = 2 units; 38-52 min = 3 units; 53-67 min = 4 units; 68-82 min = 5 units          SUBJECTIVE    Pain Level (0-10 scale): 2    Any medication changes, allergies to medications, adverse drug reactions, diagnosis change, or new procedure performed?: [x] No    [] Yes (see summary sheet for update)  Medications: Verified on Patient Summary List    Subjective functional status/changes:     Patient reports about a 60% improvement overall since starting therapy. She reports that she has been doing the exercises at home, but her leg stiffness and balance is still not where she would like to be. She reports that she did follow up with her provider who recommended to continue PT, or schedule for an MRI. Patient reports that she may get the MRI eventually, but she really does not want to have another surgery. Patient reports that she would like to try to continue therapy for another 4 weeks to see if she can gain anymore improvement for her balance.     OBJECTIVE      Therapeutic Procedures:  Tx Min Billable or 1:1 Min (if diff from Tx Min) Procedure,

## 2025-05-09 ENCOUNTER — HOSPITAL ENCOUNTER (OUTPATIENT)
Facility: HOSPITAL | Age: 70
Setting detail: RECURRING SERIES
Discharge: HOME | End: 2025-05-12
Attending: ORTHOPAEDIC SURGERY
Payer: MEDICARE

## 2025-05-09 PROCEDURE — 97110 THERAPEUTIC EXERCISES: CPT

## 2025-05-09 PROCEDURE — 97112 NEUROMUSCULAR REEDUCATION: CPT

## 2025-05-09 NOTE — PROGRESS NOTES
PHYSICAL THERAPY - MEDICARE DAILY TREATMENT NOTE (updated 3/23)      Date: 2025          Patient Name:  Tonya Brantley :  1955   Medical   Diagnosis:  Lumbago with sciatica, right side [M54.41]  Lumbago with sciatica, left side [M54.42]  Unsteadiness on feet [R26.81] Treatment Diagnosis:  M54.41  LUMBAGO WITH SCIATICA, RIGHT SIDE and M54.42  LUMBAGO WITH SCIATICA, LEFT SIDE  and R26.81   Unsteadiness on feet    Referral Source:  Jay Dangelo MD Insurance:   Payor: [employer] MEDICARE / Plan: [employer] GOLD PLUS HMO / Product Type: *No Product type* /                     Patient  verified yes     Visit #   Current  / Total 13 24   Time   In / Out 9:03 9:48   Total Treatment Time 45   Total Timed Codes 45   1:1 Treatment Time 45       [facility] Totals Reminder:  bill using total billable   min of TIMED therapeutic procedures and modalities.   8-22 min = 1 unit; 23-37 min = 2 units; 38-52 min = 3 units; 53-67 min = 4 units; 68-82 min = 5 units          SUBJECTIVE    Pain Level (0-10 scale): 4    Any medication changes, allergies to medications, adverse drug reactions, diagnosis change, or new procedure performed?: [x] No    [] Yes (see summary sheet for update)  Medications: Verified on Patient Summary List    Subjective functional status/changes:     Patient reports that she would like to continue to focus on balance today.     OBJECTIVE      Therapeutic Procedures:  Tx Min Billable or 1:1 Min (if diff from Tx Min) Procedure, Rationale, Specifics   45 [id number] Therapeutic Exercise (timed):  increase ROM, strength, coordination, balance, and proprioception to improve patient's ability to progress to PLOF and address remaining functional goals. (see flow sheet as applicable)     Details if applicable:            Details if applicable:           Details if applicable:           Details if applicable:            Details if applicable:     45 45    Total Total       [x]  Patient Education billed concurrently with

## 2025-05-14 ENCOUNTER — HOSPITAL ENCOUNTER (OUTPATIENT)
Facility: HOSPITAL | Age: 70
Setting detail: RECURRING SERIES
Discharge: HOME | End: 2025-05-17
Attending: ORTHOPAEDIC SURGERY
Payer: MEDICARE

## 2025-05-14 PROCEDURE — 97112 NEUROMUSCULAR REEDUCATION: CPT

## 2025-05-21 ENCOUNTER — HOSPITAL ENCOUNTER (OUTPATIENT)
Facility: HOSPITAL | Age: 70
Setting detail: RECURRING SERIES
End: 2025-05-21
Attending: ORTHOPAEDIC SURGERY
Payer: MEDICARE

## 2025-05-29 ENCOUNTER — HOSPITAL ENCOUNTER (OUTPATIENT)
Facility: HOSPITAL | Age: 70
Setting detail: RECURRING SERIES
End: 2025-05-29
Attending: ORTHOPAEDIC SURGERY
Payer: MEDICARE

## 2025-05-29 PROCEDURE — 97112 NEUROMUSCULAR REEDUCATION: CPT

## 2025-06-03 NOTE — PROGRESS NOTES
PHYSICAL THERAPY - MEDICARE DAILY TREATMENT NOTE (updated 3/23)      Date: 2025          Patient Name:  Tonya Brantley :  1955   Medical   Diagnosis:  Lumbago with sciatica, right side [M54.41]  Lumbago with sciatica, left side [M54.42]  Unsteadiness on feet [R26.81] Treatment Diagnosis:  M54.41  LUMBAGO WITH SCIATICA, RIGHT SIDE and M54.42  LUMBAGO WITH SCIATICA, LEFT SIDE  and R26.81   Unsteadiness on feet    Referral Source:  Jay Dangelo MD Insurance:   Payor: [employer] MEDICARE / Plan: [employer] GOLD PLUS HMO / Product Type: *No Product type* /                     Patient  verified yes     Visit #   Current  / Total 15 24   Time   In / Out 11:38 12:25   Total Treatment Time 47   Total Timed Codes 47   1:1 Treatment Time 40      [facility] Totals Reminder:  bill using total billable   min of TIMED therapeutic procedures and modalities.   8-22 min = 1 unit; 23-37 min = 2 units; 38-52 min = 3 units; 53-67 min = 4 units; 68-82 min = 5 units          SUBJECTIVE    Pain Level (0-10 scale): 3    Any medication changes, allergies to medications, adverse drug reactions, diagnosis change, or new procedure performed?: [x] No    [] Yes (see summary sheet for update)  Medications: Verified on Patient Summary List    Subjective functional status/changes:     Patient reports about a 60% improvement overall since starting therapy. She reports that the stiffness and balance are better, but now she feels like she just needs to work on her confidence.     OBJECTIVE      Therapeutic Procedures:  Tx Min Billable or 1:1 Min (if diff from Tx Min) Procedure, Rationale, Specifics   [id number] Neuromuscular Re-Education (timed):  improve balance, coordination, kinesthetic sense, posture, core stability and proprioception to improve patient's ability to develop conscious control of individual muscles and awareness of position of extremities in order to progress to PLOF and address remaining functional goals. (see flow sheet 
[facility]  [address]  [place] [address]  Phone: [phone]   Fax: [phone]     DISCHARGE SUMMARY  Patient Name: Tonya Brantley : 1955   Treatment/Medical Diagnosis: Lumbago with sciatica, right side [M54.41]  Lumbago with sciatica, left side [M54.42]  Unsteadiness on feet [R26.81]   Referral Source: Jay Dangelo MD     Date of Initial Visit: 2025 Attended Visits: 15 Missed Visits: 4     SUMMARY OF TREATMENT  Patient is a 70 year old being seen for bilateral leg \"stiffness\" and gait and balance deficits. Patient has been seen for 15  visits and has been treated using therapeutic exercise to make improvements with lumbar AROM, lower quarter strength, balance, gait, and overall functional mobility.      CURRENT STATUS    Since the initial evaluation patient does demonstrate global improvements in lower quarter strength, balance, gait, pain levels, and overall functional mobility. Patient's LE strength improved globally including hip flexion to 4+/5 (vs 4/5), left dorsiflexion to 4-/5 (vs 3-/5), bilateral hip ER and abduction to 4/5 (vs R abd = 3+, R ER = 4-, L abd = 3-, and L ER = 3+). Her 30 second sit to stand improved to 11 times (vs 9 times), single leg stance improved to R= 6 seconds (vs 2 seconds); L= 6 seconds (vs 3 seconds), and tandem stance improved to R (fwd)= 60 seconds (vs 8 seconds); L (fwd)= 50 seconds (vs 4 seconds). She also demonstrates improvement on her timed up and go as well, today completing the test in 30 seconds with a SPC (vs 42 seconds with a SPC initially). Her gait speed improved slightly as well to 0.57 m/s (vs 0.46 m/s). Her balance FOTO score improved to 51/100 (vs 43/100) and she has met 3/6 goals for discharge. Although she does still demonstrate some balance deficits, patient feels comfortable moving forward with here HEP, therefore will discharge today.     Short 
(vs 0.46 m/s). She has met 2/6 goals for discharge, however continues to demonstrate decreased strength and balance deficits which increase her risk for falls. She would continue to benefit from skilled therapy to address remaining goals, decrease fall risk, and improve overall mobility. Plan to continue to progress as able.     Short Term Goals: To be accomplished in 8-10 treatments.  Patient will be independent with initial HEP in order to transition to general wellness program. - MET  2. Patient will report worst pain level of 4/10 or better to allow for a minimum of 6 hours of interrupted sleeping ability.- MET   3.The patient will demonstrate improved tandem stance to greater than 30 seconds bilaterally to improve balance and decrease fall risk. - in progress      Long Term Goals: To be accomplished in 20-24 treatments.  1.Patient will report worst pain no greater than 2/10 to increase QOL and allow for independence with all hygienic self-care and ADL skills - progressing  2.Patient will demonstrate 5/5 BLE strength to allow for home cleaning skills independently and without rest breaks needed- progressing  3.Patient will demonstrate pain free lumbar AROM WFL to improve ease with household chores like emptying the - progressing    RECOMMENDATIONS FOR SKILLED THERAPY:  Continue 1x/week for 4 weeks       Tiffanie Moscoso, PT       5/29/2025       9:19 AM    If you have any questions/comments please contact us directly at [phone].   Thank you for allowing us to assist in the care of your patient.

## (undated) DEVICE — SOLUTION IV 250ML 0.9% SOD CHL CLR INJ FLX BG CONT PRT CLSR

## (undated) DEVICE — TOOL 14MH30 LEGEND 14CM 3MM: Brand: MIDAS REX ™

## (undated) DEVICE — STRIP,CLOSURE,WOUND,MEDI-STRIP,1/2X4: Brand: MEDLINE

## (undated) DEVICE — SYR LR LCK 1ML GRAD NSAF 30ML --

## (undated) DEVICE — HALTER TRACTION HD W/ TRI COTTON LINING FOAM LTX

## (undated) DEVICE — PAD,ABDOMINAL,5"X9",ST,LF,25/BX: Brand: MEDLINE INDUSTRIES, INC.

## (undated) DEVICE — GLOVE SURG SZ 75 L12IN FNGR THK94MIL STD WHT LTX FREE

## (undated) DEVICE — C-ARM: Brand: UNBRANDED

## (undated) DEVICE — ADHESIVE LIQ 2OZ ADJUNCT FOR DSG MASTISOL

## (undated) DEVICE — DEVICE SKIN CLOSURE 4 MM INCISION

## (undated) DEVICE — DRAIN SURG FLAT W7MMXL20CM FULL PERF

## (undated) DEVICE — LAMINECTOMY-SMH: Brand: MEDLINE INDUSTRIES, INC.

## (undated) DEVICE — SUTURE STRATAFIX SPRL MCRYL + SZ 3-0 L24IN ABSRB UD PS-2 SXMP1B108

## (undated) DEVICE — TAPE,CLOTH/SILK,CURAD,3"X10YD,LF,40/CS: Brand: CURAD

## (undated) DEVICE — GLOVE SURG SZ 8 L12IN FNGR THK79MIL GRN LTX FREE

## (undated) DEVICE — SOLUTION IRRIG 1000ML 0.9% SOD CHL USP POUR PLAS BTL

## (undated) DEVICE — 4-PORT MANIFOLD: Brand: NEPTUNE 2

## (undated) DEVICE — POSITIONER HD REST FOAM CMFRT TCH

## (undated) DEVICE — ADHESIVE SKIN CLOSURE WND 8.661X1.5 IN 22 CM LIQUIBAND SECUR

## (undated) DEVICE — RESERVOIR,SUCTION,100CC,SILICONE: Brand: MEDLINE

## (undated) DEVICE — PREP SKN DURAPREP 26ML APPL --

## (undated) DEVICE — BONE WAX WHITE: Brand: BONE WAX WHITE

## (undated) DEVICE — BIPOLAR IRRIGATOR INTEGRATED TUBING AND BIPOLAR CORD SET, DISPOSABLE

## (undated) DEVICE — COVER,MAYO STAND,STERILE: Brand: MEDLINE

## (undated) DEVICE — TELFA NON-ADHERENT ABSORBENT DRESSING: Brand: TELFA

## (undated) DEVICE — ANTERIOR CERVICAL-SMH: Brand: MEDLINE INDUSTRIES, INC.

## (undated) DEVICE — MASTISOL ADHESIVE LIQ 2/3ML

## (undated) DEVICE — PENCIL SMK EVAC 10 FT BLADE ELECTRD ROCKER FOR TELSCP

## (undated) DEVICE — DRILL BIT 7080510 11 MM DRILL BIT S